# Patient Record
Sex: MALE | Race: WHITE | NOT HISPANIC OR LATINO | Employment: FULL TIME | URBAN - METROPOLITAN AREA
[De-identification: names, ages, dates, MRNs, and addresses within clinical notes are randomized per-mention and may not be internally consistent; named-entity substitution may affect disease eponyms.]

---

## 2017-04-21 ENCOUNTER — OFFICE VISIT (OUTPATIENT)
Dept: ALLERGY | Facility: CLINIC | Age: 27
End: 2017-04-21
Payer: COMMERCIAL

## 2017-04-21 VITALS
HEIGHT: 68 IN | BODY MASS INDEX: 25.86 KG/M2 | OXYGEN SATURATION: 98 % | WEIGHT: 170.64 LBS | SYSTOLIC BLOOD PRESSURE: 124 MMHG | DIASTOLIC BLOOD PRESSURE: 79 MMHG | HEART RATE: 65 BPM

## 2017-04-21 DIAGNOSIS — J30.9 ATOPIC RHINITIS: Primary | ICD-10-CM

## 2017-04-21 DIAGNOSIS — H10.10 ALLERGIC CONJUNCTIVITIS, UNSPECIFIED LATERALITY: ICD-10-CM

## 2017-04-21 DIAGNOSIS — J45.20 INTERMITTENT ASTHMA, UNCOMPLICATED: ICD-10-CM

## 2017-04-21 LAB
FEF 25/75: NORMAL
FEV-1: NORMAL
FEV1/FVC: NORMAL
FVC: NORMAL

## 2017-04-21 PROCEDURE — 99204 OFFICE O/P NEW MOD 45 MIN: CPT | Mod: 25 | Performed by: ALLERGY & IMMUNOLOGY

## 2017-04-21 PROCEDURE — A4627 SPACER BAG/RESERVOIR: HCPCS | Performed by: ALLERGY & IMMUNOLOGY

## 2017-04-21 RX ORDER — ALBUTEROL SULFATE 90 UG/1
2 AEROSOL, METERED RESPIRATORY (INHALATION) EVERY 4 HOURS PRN
Qty: 1 INHALER | Refills: 3 | Status: SHIPPED | OUTPATIENT
Start: 2017-04-21 | End: 2017-09-29

## 2017-04-21 RX ORDER — FLUTICASONE PROPIONATE 50 MCG
2 SPRAY, SUSPENSION (ML) NASAL DAILY
Qty: 1 BOTTLE | Refills: 11 | Status: SHIPPED | OUTPATIENT
Start: 2017-04-21

## 2017-04-21 ASSESSMENT — ENCOUNTER SYMPTOMS
HEADACHES: 0
EYE ITCHING: 1
COUGH: 0
STRIDOR: 0
SINUS PRESSURE: 1
ADENOPATHY: 0
RHINORRHEA: 1
ACTIVITY CHANGE: 0
MYALGIAS: 0
VOMITING: 0
EYE DISCHARGE: 0
WHEEZING: 1
FATIGUE: 0
FACIAL SWELLING: 0
EYE REDNESS: 1
SHORTNESS OF BREATH: 0
CHEST TIGHTNESS: 0
FEVER: 0
NAUSEA: 0
DIARRHEA: 0

## 2017-04-21 NOTE — NURSING NOTE
Writer demonstrated how to use an MDI inhaler with a spacer for patient.  Patient instructed to shake the inhaler for 5 seconds, empty the lungs by exhaling away from inhaler, put the inhaler + spacer in his mouth, push down on the inhaler and breathe in at the same time and then hold breath for 10 seconds.  RN informed patient that if an audible whistle is heard from spacer, he needs to inhale more slowly.  Patient advised to wait 1-2 minutes between puffs.  Patient instructed on how to clean the MDI inhaler, take the medication canister out, wash it in warm soapy water, rinse it and then let it dry over night.  RN advised patient to refer to handout with spacer for cleaning instructions.  Patient informed the inhaler needs to be washed once a week or when he notices a powder buildup.  Patient verbalized understanding.     Vianney TOMLINSON RN  Specialty Flex

## 2017-04-21 NOTE — NURSING NOTE
"Initial /79 (BP Location: Left arm, Patient Position: Chair, Cuff Size: Adult Regular)  Pulse 65  Ht 5' 8.11\" (1.73 m)  Wt 170 lb 10.2 oz (77.4 kg)  SpO2 98%  BMI 25.86 kg/m2 Estimated body mass index is 25.86 kg/(m^2) as calculated from the following:    Height as of this encounter: 5' 8.11\" (1.73 m).    Weight as of this encounter: 170 lb 10.2 oz (77.4 kg). .    "

## 2017-04-21 NOTE — MR AVS SNAPSHOT
"              After Visit Summary   4/21/2017    Dmitri Mcguire    MRN: [id number]           Patient Information     Date Of Birth          1990        Visit Information        Provider Department      4/21/2017 8:20 AM Carlo Alegre MD [facility]        Today's Diagnoses     Atopic rhinitis    -  1    Allergic conjunctivitis, unspecified laterality        Intermittent asthma, uncomplicated          Care Instructions    -start Flonase 2 sprays/each nostril once a day.  -Start albuterol inhaler 2-4 puffs every 4-6 hours as needed for chest tightness/wheezing/shortness of breath/persistent cough.  -Use all MDI inhalers with spacer/chamber device.        Follow-ups after your visit        Follow-up notes from your care team     Return in about 1 week (around 4/28/2017) for rhinitis follow up, skin testing.      Who to contact     If you have questions or need follow up information about today's clinic visit or your schedule please contact [facility] directly at [phone].  Normal or non-critical lab and imaging results will be communicated to you by Coterahart, letter or phone within 4 business days after the clinic has received the results. If you do not hear from us within 7 days, please contact the clinic through Caspert or phone. If you have a critical or abnormal lab result, we will notify you by phone as soon as possible.  Submit refill requests through Localo or call your pharmacy and they will forward the refill request to us. Please allow 3 business days for your refill to be completed.          Additional Information About Your Visit        CoteraharAkira Mobile Information     Localo lets you send messages to your doctor, view your test results, renew your prescriptions, schedule appointments and more. To sign up, go to [email or web address] . Click on \"Log in\" on the left side of the screen, which will take you to the Welcome page. Then click on \"Sign up Now\" on the right side of the " "page.     You will be asked to enter the access code listed below, as well as some personal information. Please follow the directions to create your username and password.     Your access code is: [email or web address]  Expires: 2017  9:35 AM     Your access code will  in 90 days. If you need help or a new code, please call your [place] clinic or [phone].        Care EveryWhere ID     This is your Care EveryWhere ID. This could be used by other organizations to access your [facility] medical records  [id number]        Your Vitals Were     Pulse Height Pulse Oximetry BMI (Body Mass Index)          65 5' 8.11\" (1.73 m) 98% 25.86 kg/m2         Blood Pressure from Last 3 Encounters:   17 124/79   16 (!) 138/91   10/30/15 118/72    Weight from Last 3 Encounters:   17 170 lb 10.2 oz (77.4 kg)   16 160 lb (72.6 kg)   10/30/15 159 lb (72.1 kg)              We Performed the Following     OPTICHAMBER     Spirometry, Breathing Capacity          Today's Medication Changes          These changes are accurate as of: 17  9:35 AM.  If you have any questions, ask your nurse or doctor.               Start taking these medicines.        Dose/Directions    albuterol 108 (90 BASE) MCG/ACT Inhaler   Commonly known as:  PROAIR HFA/PROVENTIL HFA/VENTOLIN HFA   Used for:  Intermittent asthma, uncomplicated   Started by:  Carlo Alegre MD        Dose:  2 puff   Inhale 2 puffs into the lungs every 4 hours as needed   Quantity:  1 Inhaler   Refills:  3       fluticasone 50 MCG/ACT spray   Commonly known as:  FLONASE   Used for:  Atopic rhinitis   Started by:  Carlo Alegre MD        Dose:  2 spray   Spray 2 sprays into both nostrils daily   Quantity:  1 Bottle   Refills:  11            Where to get your medicines      These medications were sent to [facility] [id number] [place] [address] [address] AT [facility] OF [address] [place] [address]  [address] [facility] [address]     Phone:  " [phone]     albuterol 108 (90 BASE) MCG/ACT Inhaler    fluticasone 50 MCG/ACT spray                Primary Care Provider Office Phone #    [facility] [phone]       No address on file        Thank you!     Thank you for choosing [facility]  for your care. Our goal is always to provide you with excellent care. Hearing back from our patients is one way we can continue to improve our services. Please take a few minutes to complete the written survey that you may receive in the mail after your visit with us. Thank you!             Your Updated Medication List - Protect others around you: Learn how to safely use, store and throw away your medicines at www.disposemymeds.org.          This list is accurate as of: 4/21/17  9:35 AM.  Always use your most recent med list.                   Brand Name Dispense Instructions for use    ADVIL 200 MG capsule   Generic drug:  ibuprofen      Take 200 mg by mouth every 4 hours as needed Reported on 4/21/2017       albuterol 108 (90 BASE) MCG/ACT Inhaler    PROAIR HFA/PROVENTIL HFA/VENTOLIN HFA    1 Inhaler    Inhale 2 puffs into the lungs every 4 hours as needed       azithromycin 250 MG tablet    ZITHROMAX    6 tablet    Two tablets first day, then one tablet daily for four days.       fluticasone 50 MCG/ACT spray    FLONASE    1 Bottle    Spray 2 sprays into both nostrils daily

## 2017-04-21 NOTE — PROGRESS NOTES
"SUBJECTIVE:                                                               Dmitri Mcguire presents today to our Allergy Clinic at [facility]; he is being seen  for a new patient visit.  As you know, he is a 27 year old male with presumptive allergic rhinitis.  3 years ago , he began to have seasonally-exacerbated (Spring, Summer and Fall) chronic nasal symptoms (itch, clear rhinorrhea, stuffiness, and sneezing) and ocular symptoms (itching).  HGe is worse around dogs/cats.   Intranasal steroids have not been used. Oral antihistamines /fexofenadine/diphenhydramine) have been used and Allegra seemed to be helpful. The patient symptoms are currently 75% controlled.      There is no history of PE tubes, sinus surgeries, or T/A.    He has history of wheezing around a lot of dogs. Has light symptoms of wheezing, SOB and chest tightness in Spring on and off. Has symptoms less than twice a week.  No previous history of hospitalizations.   Was diagnosed with asthma as a child. Was on inhaler before. Despite having symptoms, he was not prescribed albuterol inhaler.     Skin testing performed by Dr. Castillo Estes, ENT doctor, in 2015 with possible sensitivity forcat, dog, tree pollen (white jennifer, [place], [place], [place],  red mulberry), meadow  fescue and perennial rye grass, ans sheep sorrel. He had some intradermal testing done that I am not able to interpret.      When he was a child he took penicillin, he developed joint swelling, and his teeth \"turned balck\".  He has been avoiding penicillins since then. He will find more information from his father.    Patient Active Problem List   Diagnosis     Intermittent asthma, uncomplicated       Past Medical History:   Diagnosis Date     Allergic rhinitis due to other allergen      Mild intermittent asthma       Problem (# of Occurrences) Relation (Name,Age of Onset)    Cardiovascular (1) Maternal Grandfather: stents    HEART DISEASE (1) Maternal " Grandfather        History reviewed. No pertinent surgical history.  Social History     Social History     Marital status: Single     Spouse name: N/A     Number of children: N/A     Years of education: N/A     Social History Main Topics     Smoking status: Former Smoker     Years: 3.00     Types: Cigarettes, Dip, chew, snus or snuff     Smokeless tobacco: Current User     Types: Chew     Alcohol use Yes     Drug use: No     Sexual activity: Yes     Partners: Female     Birth control/ protection: Condom     Other Topics Concern     Parent/Sibling W/ Cabg, Mi Or Angioplasty Before 65f 55m? No     Social History Narrative    April 21, 2017    ENVIRONMENTAL HISTORY: [place] home is heated with a gas furnace. They does have central air conditioning. The patient's bedroom is furnished with carpeting in bedroom and fabric window coverings.  Pets inside the house include 1 rabbit. There is not history of cockroach or mice infestation. There is/are 0 smokers in the house.  The house does not have a damp basement.            Review of Systems   Constitutional: Negative for activity change, fatigue and fever.   HENT: Positive for postnasal drip, rhinorrhea, sinus pressure and sneezing. Negative for congestion, ear pain, facial swelling and nosebleeds.    Eyes: Positive for redness and itching. Negative for discharge.   Respiratory: Positive for wheezing. Negative for cough, chest tightness, shortness of breath and stridor.    Gastrointestinal: Negative for diarrhea, nausea and vomiting.   Musculoskeletal: Negative for myalgias.   Skin: Negative for rash.   Neurological: Negative for headaches.   Hematological: Negative for adenopathy.   Psychiatric/Behavioral: Negative for behavioral problems and self-injury.           Current Outpatient Prescriptions:      fluticasone (FLONASE) 50 MCG/ACT spray, Spray 2 sprays into both nostrils daily, Disp: 1 Bottle, Rfl: 11     albuterol  "(PROAIR HFA/PROVENTIL HFA/VENTOLIN HFA) 108 (90 BASE) MCG/ACT Inhaler, Inhale 2 puffs into the lungs every 4 hours as needed, Disp: 1 Inhaler, Rfl: 3     azithromycin (ZITHROMAX) 250 MG tablet, Two tablets first day, then one tablet daily for four days. (Patient not taking: Reported on 4/21/2017), Disp: 6 tablet, Rfl: 0     ibuprofen (ADVIL) 200 MG capsule, Take 200 mg by mouth every 4 hours as needed Reported on 4/21/2017, Disp: , Rfl:   Immunization History   Administered Date(s) Administered     DPT 1990, 1990, 1990, 10/05/1993, 05/14/1997     HIB 1990, 1992, 1992, 05/04/1993     Hepatitis A Vac Ped/Adol-2 Dose 08/25/2010     Hepatitis B 08/18/2004, 09/24/2004, 08/03/2005     IPV 05/14/1997     Influenza (IIV3) 11/27/2007, 12/01/2008, 10/05/2009, 09/17/2010     MMR 05/04/1993, 08/18/2004     Meningococcal (Menactra ) 08/25/2010     OPV 1992, 1992, 10/05/1993     Tetanus 08/18/2004     Allergies   Allergen Reactions     Dogs Hives     Amoxicillin Unknown     Ceclor [Cefaclor] Unknown     Erythrocin [Erythromycin] Diarrhea     Penicillins Unknown     OBJECTIVE:                                                                 /79 (BP Location: Left arm, Patient Position: Chair, Cuff Size: Adult Regular)  Pulse 65  Ht 5' 8.11\" (1.73 m)  Wt 170 lb 10.2 oz (77.4 kg)  SpO2 98%  BMI 25.86 kg/m2        Physical Exam   Constitutional: He is oriented to person, place, and time. No distress.   HENT:   Head: Normocephalic and atraumatic.   Right Ear: Tympanic membrane, external ear and ear canal normal.   Left Ear: Tympanic membrane, external ear and ear canal normal.   Nose: No mucosal edema or rhinorrhea.   Eyes: Conjunctivae are normal. Right eye exhibits no discharge. Left eye exhibits no discharge.   Neck: Normal range of motion.   Cardiovascular: Normal rate, regular rhythm and normal heart sounds.    No murmur heard.  Pulmonary/Chest: Effort normal and breath " sounds normal. No respiratory distress. He has no wheezes. He has no rales.   Musculoskeletal: Normal range of motion.   Lymphadenopathy:     He has no cervical adenopathy.   Neurological: He is alert and oriented to person, place, and time.   Skin: Skin is warm. He is not diaphoretic.   Psychiatric: Mood, memory and affect normal.   Nursing note and vitals reviewed.            WORKUP:SPIROMETRY       FVC 4.80L (92% of predicted).     FEV1 3.79L (87% of predicted).     FEV1/FVC 79     FEF 25%-75%  3.44L/s (74% of predicted).    Office spirometry performed today suggests borderline small airway limitation.    ASSESSMENT/PLAN:            Visit Diagnoses     1. Atopic rhinitis    -  Primary  Historically allergic, considering previous testing by ENT; however, unable to interpret all the results.  Unable to repeat percutaneous skin puncture testing for aeroallergens today since he took fexofenadine yesterday. Agreed to perform it next week.  Meanwhile, start intranasal fluticasone 2 sprays in each nostril once a day. He was instructed to hold oral antihistamines for one week.    Relevant Medications    fluticasone (FLONASE) 50 MCG/ACT spray    albuterol (PROAIR HFA/PROVENTIL HFA/VENTOLIN HFA) 108 (90 BASE) MCG/ACT Inhaler    2. Allergic conjunctivitis, unspecified laterality      We will see Flonase with control his conjunctivitis symptoms. If not, may recommend ketotifen eyedrops over-the-counter.    3. Intermittent asthma, uncomplicated      -Start albuterol inhaler 2-4 puffs every 4-6 hours as needed for chest tightness/wheezing/shortness of breath/persistent cough.  -Use all MDI inhalers with spacer/chamber device.  Borderline small airway limitation only. Currently asymptomatic. Would not treat the number only.      Relevant Medications    fluticasone (FLONASE) 50 MCG/ACT spray    albuterol (PROAIR HFA/PROVENTIL HFA/VENTOLIN HFA) 108 (90 BASE) MCG/ACT Inhaler    Other Relevant Orders    OPTICHAMBER (Completed)     Spirometry, Breathing Capacity            Follow up in 1 week for SPT or sooner if needed.    Thank you for allowing us to participate in the care of this patient. Please feel free to contact us if there are any questions or concerns about the patient.    Disclaimer: This note consists of symbols derived from keyboarding, dictation and/or voice recognition software. As a result, there may be errors in the script that have gone undetected. Please consider this when interpreting information found in this chart.    Carlo Alegre MD   Allergy, Asthma and Immunology  [place], [place] and [place]

## 2017-04-21 NOTE — PATIENT INSTRUCTIONS
-start Flonase 2 sprays/each nostril once a day.  -Start albuterol inhaler 2-4 puffs every 4-6 hours as needed for chest tightness/wheezing/shortness of breath/persistent cough.  -Use all MDI inhalers with spacer/chamber device.

## 2017-04-21 NOTE — LETTER
To Whom It May Concern:    RE:  Dmitri Mcguire    Dmitri Mcguire was seen today at our clinic.  He will need to return to clinic in one week, 4/28/2017, at 11:40 a.m. to be further assessed.    Please contact me with any questions or concerns.    Sincerely,        Eryn Avila MA  [facility] Allergy and Asthma Department

## 2017-04-28 ENCOUNTER — OFFICE VISIT (OUTPATIENT)
Dept: ALLERGY | Facility: CLINIC | Age: 27
End: 2017-04-28
Payer: COMMERCIAL

## 2017-04-28 VITALS — SYSTOLIC BLOOD PRESSURE: 131 MMHG | DIASTOLIC BLOOD PRESSURE: 84 MMHG | HEART RATE: 71 BPM | TEMPERATURE: 98.8 F

## 2017-04-28 DIAGNOSIS — H10.45 CHRONIC ALLERGIC CONJUNCTIVITIS: ICD-10-CM

## 2017-04-28 DIAGNOSIS — J30.81 NON-SEASONAL ALLERGIC RHINITIS DUE TO ANIMAL HAIR AND DANDER: ICD-10-CM

## 2017-04-28 DIAGNOSIS — J30.1 SEASONAL ALLERGIC RHINITIS DUE TO POLLEN: Primary | ICD-10-CM

## 2017-04-28 DIAGNOSIS — Z51.6 NEED FOR DESENSITIZATION TO ALLERGENS: ICD-10-CM

## 2017-04-28 PROCEDURE — 95004 PERQ TESTS W/ALRGNC XTRCS: CPT | Performed by: ALLERGY & IMMUNOLOGY

## 2017-04-28 PROCEDURE — 99207 ZZC NO BILLABLE SERVICE THIS VISIT: CPT | Performed by: ALLERGY & IMMUNOLOGY

## 2017-04-28 RX ORDER — EPINEPHRINE 0.3 MG/.3ML
0.3 INJECTION SUBCUTANEOUS PRN
Qty: 0.6 ML | Refills: 1 | Status: SHIPPED | OUTPATIENT
Start: 2017-04-28 | End: 2018-05-18

## 2017-04-28 ASSESSMENT — ENCOUNTER SYMPTOMS
FEVER: 0
NAUSEA: 0
FATIGUE: 0
EYE ITCHING: 1
ADENOPATHY: 0
MYALGIAS: 0
EYE DISCHARGE: 0
EYE REDNESS: 1
ACTIVITY CHANGE: 0
SHORTNESS OF BREATH: 0
STRIDOR: 0
VOMITING: 0
RHINORRHEA: 1
DIARRHEA: 0
HEADACHES: 0
WHEEZING: 0
SINUS PRESSURE: 1
CHEST TIGHTNESS: 0
FACIAL SWELLING: 0
COUGH: 0

## 2017-04-28 NOTE — PROGRESS NOTES
SUBJECTIVE:                                                               Dmitri Mcguire, 27 year old male presents today to our Allergy Clinic at [facility], for percutaneous skin puncture testing for aeroallergens.         Patient Active Problem List   Diagnosis     Intermittent asthma, uncomplicated     Seasonal allergic rhinitis due to pollen     Non-seasonal allergic rhinitis due to animal hair and dander     Chronic allergic conjunctivitis       Past Medical History:   Diagnosis Date     Allergic rhinitis due to other allergen      Mild intermittent asthma       Problem (# of Occurrences) Relation (Name,Age of Onset)    Cardiovascular (1) Maternal Grandfather: stents    HEART DISEASE (1) Maternal Grandfather        No past surgical history on file.  Social History     Social History     Marital status: Single     Spouse name: N/A     Number of children: N/A     Years of education: N/A     Social History Main Topics     Smoking status: Former Smoker     Years: 3.00     Types: Cigarettes, Dip, chew, snus or snuff     Smokeless tobacco: Current User     Types: Chew     Alcohol use Yes     Drug use: No     Sexual activity: Yes     Partners: Female     Birth control/ protection: Condom     Other Topics Concern     Parent/Sibling W/ Cabg, Mi Or Angioplasty Before 65f 55m? No     Social History Narrative    April 21, 2017    ENVIRONMENTAL HISTORY: [place] The home is heated with a gas furnace. They does have central air conditioning. The patient's bedroom is furnished with carpeting in bedroom and fabric window coverings.  Pets inside the house include 1 rabbit. There is not history of cockroach or mice infestation. There are 0 smokers in the house.  The house does not have a damp basement.            Review of Systems   Constitutional: Negative for activity change, fatigue and fever.   HENT: Positive for congestion, postnasal drip, rhinorrhea, sinus  pressure and sneezing. Negative for ear pain, facial swelling and nosebleeds.    Eyes: Positive for redness and itching. Negative for discharge.   Respiratory: Negative for cough, chest tightness, shortness of breath, wheezing and stridor.    Gastrointestinal: Negative for diarrhea, nausea and vomiting.   Musculoskeletal: Negative for myalgias.   Skin: Negative for rash.   Neurological: Negative for headaches.   Hematological: Negative for adenopathy.   Psychiatric/Behavioral: Negative for behavioral problems and self-injury.           Current Outpatient Prescriptions:      EPINEPHrine (AUVI-Q) 0.3 MG/0.3ML injection, Inject 0.3 mLs (0.3 mg) into the muscle as needed for anaphylaxis, Disp: 0.6 mL, Rfl: 1     fluticasone (FLONASE) 50 MCG/ACT spray, Spray 2 sprays into both nostrils daily, Disp: 1 Bottle, Rfl: 11     albuterol (PROAIR HFA/PROVENTIL HFA/VENTOLIN HFA) 108 (90 BASE) MCG/ACT Inhaler, Inhale 2 puffs into the lungs every 4 hours as needed (Patient not taking: Reported on 4/28/2017), Disp: 1 Inhaler, Rfl: 3     ibuprofen (ADVIL) 200 MG capsule, Take 200 mg by mouth every 4 hours as needed Reported on 4/28/2017, Disp: , Rfl:   Immunization History   Administered Date(s) Administered     DPT 1992, 1992, 1992, 10/05/1993, 05/14/1997     HIB 1992, 1992, 1992, 05/04/1993     Hepatitis A Vac Ped/Adol-2 Dose 08/25/2010     Hepatitis B 08/18/2004, 09/24/2004, 08/03/2005     IPV 05/14/1997     Influenza (IIV3) 11/27/2007, 12/01/2008, 10/05/2009, 09/17/2010     MMR 05/04/1993, 08/18/2004     Meningococcal (Menactra ) 08/25/2010     OPV 1992, 1992, 10/05/1993     Tetanus 08/18/2004     Allergies   Allergen Reactions     Dogs Hives     Amoxicillin Unknown     Ceclor [Cefaclor] Unknown     Erythrocin [Erythromycin] Diarrhea     Penicillins Unknown     OBJECTIVE:                                                                 /84 (BP Location: Left arm, Patient  Position: Chair, Cuff Size: Adult Regular)  Pulse 71  Temp 98.8  F (37.1  C)        Physical Exam   Constitutional: No distress.   HENT:   Head: Normocephalic and atraumatic.   Eyes: Conjunctivae are normal.   Neck: Neck supple.   Cardiovascular: Normal rate and regular rhythm.    Musculoskeletal: Normal range of motion.   Neurological: He is alert.   Skin: Skin is dry. He is not diaphoretic.   Psychiatric: Affect normal.   Nursing note and vitals reviewed.            WORKUP:     Percutaneous skin puncture testing for aeroallergens performed today (April 28, 2017) showed sensitivity for dog, cat, rabbit and tree pollen (Birch mix, [place], [place], [place], and [place]). Borderline sensitivity for Selena/Greene noted. The patient had appropriate responses to positive and negative controls. See scanned testing sheet for more details.      ASSESSMENT/PLAN:      Problem List Items Addressed This Visit        Respiratory    1. Seasonal allergic rhinitis due to pollen - Primary  Avoidance measures discussed and information provided.  -Intranasal fluticasone 2 sprays in each nostril once a day.  He is interested in allergen immunotherapy.  The patient was counseled regarding the time commitment, risks, and benefits of allergen immunotherapy and he wishes to proceed.  We'll start once Spring is over (worse season for him) and AUVI-Q is on hands.    Relevant Medications    EPINEPHrine (AUVI-Q) 0.3 MG/0.3ML injection    Other Relevant Orders    ALLERGY SKIN TESTS,ALLERGENS (Completed)    2. Non-seasonal allergic rhinitis due to animal hair and dander    Relevant Medications    EPINEPHrine (AUVI-Q) 0.3 MG/0.3ML injection    Other Relevant Orders    ALLERGY SKIN TESTS,ALLERGENS (Completed)       Infectious/Inflammatory    3. Chronic allergic conjunctivitis  We'll see if Flonase would control his conjunctivitis symptoms. If not, may recommend ketotifen eyedrops over-the-counter.            Follow up in 3 months or sooner  if needed.    Thank you for allowing us to participate in the care of this patient. Please feel free to contact us if there are any questions or concerns about the patient.    Disclaimer: This note consists of symbols derived from keyboarding, dictation and/or voice recognition software. As a result, there may be errors in the script that have gone undetected. Please consider this when interpreting information found in this chart.    Carlo Alegre MD   Allergy, Asthma and Immunology  [place], [place] and [place]

## 2017-04-28 NOTE — PROGRESS NOTES
ALLERGY SOLUTION NEW REQUEST    Dmitri Mcguire 1990 MRN: [id number]    DATE NEEDED:  6/15/2017. The patient will not start the immunotherapy injections right away.       Vial Color Content   Top Dose         Vial Size  Green 1:1,000                          Cat, Dog                         Red 1:1 0.5                 5mL  Blue 1:100                          Cat, Dog                          Red 1:1 0.5                5mL  Yellow 1:10                          Cat, Dog                          Red 1:1 0.5                5mL  Red 1:1                           Cat, Dog                           Red 1:1 0.5                5mL          Shot Clinic Location:  [place]  Ship to Location: [place]  Special Instructions:  The patient will not start the immunotherapy injections right away. Will start in June'17.        Updated Prescription Needed: No      Requester Signature  Carlo Alegre

## 2017-04-28 NOTE — MR AVS SNAPSHOT
After Visit Summary   4/28/2017    Dmitri Mcguire    MRN: [id number]           Patient Information     Date Of Birth          1990        Visit Information        Provider Department      4/28/2017 11:40 AM Carlo Alegre MD [facility]        Today's Diagnoses     Seasonal allergic rhinitis due to pollen    -  1    Non-seasonal allergic rhinitis due to animal hair and dander        Chronic allergic conjunctivitis          Care Instructions    AEROALLERGEN AVOIDANCE INSTRUCTIONS  POLLEN  Pollens are the tiny airborne particles given off by trees, weeds, and grasses. They can be the cause of seasonal allergic rhinitis or hay fever symptoms, which include stuffy, itchy, runny nose, redness, swelling and itching of the eyes, and itching of the ears and throat. Here are some tips on how to avoid pollen exposure.  1. .Keep windows closed and use the air conditioner when possible.  2.  Avoid outside exposure in the early morning as pollen counts are highest at that time.  3.  Take a shower and wash hair each night.  4.  Consider wearing a mask when working in the yard and/or garden.  5.  Clean furnace filter monthly with HEPA filters. Consider a HEPA filter vacuum  which will prevent pollen from being reintroduced into the air.   PETS  Pets present many problems for people with allergies. Dander from pets is very difficult to remove and also is a food source for dust mites.  1.  If possible, find the pet a new home.  2.  If not possible, keep the pet outdoors. Never allow the pet into the bedroom.  3.  Wash pet weekly in warm water.  4.  Encase mattresses, pillows, and box springs in allergen-proof covers.  5.  Use HEPA air filters and a HEPA filter vacuum . Change filters monthly.            Follow-ups after your visit        Follow-up notes from your care team     Return in about 3 months (around 7/28/2017), or if symptoms worsen or fail to improve, for rhinitis follow up,  "asthma follow up.      Who to contact     If you have questions or need follow up information about today's clinic visit or your schedule please contact [facility] directly at [phone].  Normal or non-critical lab and imaging results will be communicated to you by MyChart, letter or phone within 4 business days after the clinic has received the results. If you do not hear from us within 7 days, please contact the clinic through MyChart or phone. If you have a critical or abnormal lab result, we will notify you by phone as soon as possible.  Submit refill requests through [employer] or call your pharmacy and they will forward the refill request to us. Please allow 3 business days for your refill to be completed.          Additional Information About Your Visit        [employer] [facility] Information     [employer] Network lets you send messages to your doctor, view your test results, renew your prescriptions, schedule appointments and more. To sign up, go to [email or web address] . Click on \"Log in\" on the left side of the screen, which will take you to the Welcome page. Then click on \"Sign up Now\" on the right side of the page.     You will be asked to enter the access code listed below, as well as some personal information. Please follow the directions to create your username and password.     Your access code is: [email or web address]  Expires: 2017  9:35 AM     Your access code will  in 90 days. If you need help or a new code, please call your [place] clinic or [phone].        Care EveryWhere ID     This is your Care EveryWhere ID. This could be used by other organizations to access your [place] medical records  [id number]        Your Vitals Were     Pulse Temperature                71 98.8  F (37.1  C)           Blood Pressure from Last 3 Encounters:   17 131/84   17 124/79   16 (!) 138/91    Weight from Last 3 Encounters:   17 170 lb 10.2 oz (77.4 kg)   16 160 lb (72.6 kg) "   10/30/15 159 lb (72.1 kg)              We Performed the Following     ALLERGY SKIN TESTS,ALLERGENS          Today's Medication Changes          These changes are accurate as of: 4/28/17  1:26 PM.  If you have any questions, ask your nurse or doctor.               Start taking these medicines.        Dose/Directions    EPINEPHrine 0.3 MG/0.3ML injection   Commonly known as:  AUVI-Q   Used for:  Seasonal allergic rhinitis due to pollen, Non-seasonal allergic rhinitis due to animal hair and dander   Started by:  Carlo Alegre MD        Dose:  0.3 mg   Inject 0.3 mLs (0.3 mg) into the muscle as needed for anaphylaxis   Quantity:  0.6 mL   Refills:  1            Where to get your medicines      These medications were sent to [facility] [place] [address]     Phone:  [phone]     EPINEPHrine 0.3 MG/0.3ML injection                Primary Care Provider Office Phone #    [facility] [phone]       No address on file        Thank you!     Thank you for choosing [facility]  for your care. Our goal is always to provide you with excellent care. Hearing back from our patients is one way we can continue to improve our services. Please take a few minutes to complete the written survey that you may receive in the mail after your visit with us. Thank you!             Your Updated Medication List - Protect others around you: Learn how to safely use, store and throw away your medicines at www.disposemymeds.org.          This list is accurate as of: 4/28/17  1:26 PM.  Always use your most recent med list.                   Brand Name Dispense Instructions for use    ADVIL 200 MG capsule   Generic drug:  ibuprofen      Take 200 mg by mouth every 4 hours as needed Reported on 4/28/2017       albuterol 108 (90 BASE) MCG/ACT Inhaler    PROAIR HFA/PROVENTIL HFA/VENTOLIN HFA    1 Inhaler    Inhale 2 puffs into the lungs  every 4 hours as needed       EPINEPHrine 0.3 MG/0.3ML injection    AUVI-Q    0.6 mL    Inject 0.3 mLs (0.3 mg) into the muscle as needed for anaphylaxis       fluticasone 50 MCG/ACT spray    FLONASE    1 Bottle    Spray 2 sprays into both nostrils daily

## 2017-04-28 NOTE — PATIENT INSTRUCTIONS
AEROALLERGEN AVOIDANCE INSTRUCTIONS  POLLEN  Pollens are the tiny airborne particles given off by trees, weeds, and grasses. They can be the cause of seasonal allergic rhinitis or hay fever symptoms, which include stuffy, itchy, runny nose, redness, swelling and itching of the eyes, and itching of the ears and throat. Here are some tips on how to avoid pollen exposure.  1. .Keep windows closed and use the air conditioner when possible.  2.  Avoid outside exposure in the early morning as pollen counts are highest at that time.  3.  Take a shower and wash hair each night.  4.  Consider wearing a mask when working in the yard and/or garden.  5.  Clean furnace filter monthly with HEPA filters. Consider a HEPA filter vacuum  which will prevent pollen from being reintroduced into the air.   PETS  Pets present many problems for people with allergies. Dander from pets is very difficult to remove and also is a food source for dust mites.  1.  If possible, find the pet a new home.  2.  If not possible, keep the pet outdoors. Never allow the pet into the bedroom.  3.  Wash pet weekly in warm water.  4.  Encase mattresses, pillows, and box springs in allergen-proof covers.  5.  Use HEPA air filters and a HEPA filter vacuum . Change filters monthly.

## 2017-04-28 NOTE — Clinical Note
New Allergy Patient--please call the patient when serum(s) arrive(s). The patient will not start the immunotherapy injections right away. Will start in June'17. Needs to have AUVI-Q on hands.

## 2017-04-28 NOTE — NURSING NOTE
"Chief Complaint   Patient presents with     Allergy Testing Followup     skin testing       Initial /84 (BP Location: Left arm, Patient Position: Chair, Cuff Size: Adult Regular)  Pulse 71  Temp 98.8  F (37.1  C) Estimated body mass index is 25.86 kg/(m^2) as calculated from the following:    Height as of 4/21/17: 5' 8.11\" (1.73 m).    Weight as of 4/21/17: 170 lb 10.2 oz (77.4 kg).  Medication Reconciliation: complete    "

## 2017-05-25 ENCOUNTER — TELEPHONE (OUTPATIENT)
Dept: ALLERGY | Facility: CLINIC | Age: 27
End: 2017-05-25

## 2017-05-25 DIAGNOSIS — J30.2 SEASONAL ALLERGIC RHINITIS: Primary | ICD-10-CM

## 2017-05-25 PROCEDURE — 95165 ANTIGEN THERAPY SERVICES: CPT | Performed by: ALLERGY & IMMUNOLOGY

## 2017-05-25 NOTE — PROGRESS NOTES
Allergy serums received at [place].     Vials received below:    Vial Color   Content                                Vial Size Expiration Date  Green 1:1,000, Blue 1:100, Yellow 1:10 and Red 1:1 Cat, Dog 5mL  11/24/17,5/24/18        Signature  Rosalie Stringer

## 2017-05-25 NOTE — PROGRESS NOTES
Allergy serums billed at [place].     Vials received below:    Vial Color Content                      Vial Size Expiration Date  Green 1:1,000, Blue 1:100, Yellow 1:10 and Red 1:1 Cat, Dog 5mL  11/24/17, 5/24/18      Original Refill encounter date: 4/28/17      [place] Stringer

## 2017-05-25 NOTE — TELEPHONE ENCOUNTER
Patients serum have been received.  Tried to call patient and let him know that serums are here, but mailbox is full so unable to leave a message.    Rosalie Stringer, CMA

## 2017-05-30 NOTE — TELEPHONE ENCOUNTER
Attempted to reach patient to notify him that his allergy serums have arrived in clinic. Mailbox is still full and unable to leave a message for patient to return call to clinic.    Eryn Avila MA

## 2017-06-01 ENCOUNTER — OFFICE VISIT (OUTPATIENT)
Dept: FAMILY MEDICINE | Facility: CLINIC | Age: 27
End: 2017-06-01
Payer: COMMERCIAL

## 2017-06-01 ENCOUNTER — HOSPITAL ENCOUNTER (OUTPATIENT)
Dept: CT IMAGING | Facility: CLINIC | Age: 27
Discharge: HOME | End: 2017-06-01
Attending: NURSE PRACTITIONER | Admitting: NURSE PRACTITIONER
Payer: COMMERCIAL

## 2017-06-01 VITALS
BODY MASS INDEX: 25.6 KG/M2 | TEMPERATURE: 99.2 F | HEART RATE: 80 BPM | WEIGHT: 168.9 LBS | DIASTOLIC BLOOD PRESSURE: 78 MMHG | HEIGHT: 68 IN | SYSTOLIC BLOOD PRESSURE: 124 MMHG

## 2017-06-01 DIAGNOSIS — R10.31 RLQ ABDOMINAL PAIN: ICD-10-CM

## 2017-06-01 DIAGNOSIS — Z23 ENCOUNTER FOR IMMUNIZATION: Primary | ICD-10-CM

## 2017-06-01 LAB
ALBUMIN UR-MCNC: NEGATIVE MG/DL
APPEARANCE UR: CLEAR
BASOPHILS # BLD AUTO: 0 10E9/L (ref 0–0.2)
BASOPHILS NFR BLD AUTO: 0.3 %
BILIRUB UR QL STRIP: NEGATIVE
COLOR UR AUTO: YELLOW
CRP SERPL-MCNC: <2.9 MG/L (ref 0–8)
DIFFERENTIAL METHOD BLD: NORMAL
EOSINOPHIL # BLD AUTO: 0.4 10E9/L (ref 0–0.7)
EOSINOPHIL NFR BLD AUTO: 5.5 %
GLUCOSE UR STRIP-MCNC: NEGATIVE MG/DL
HGB UR QL STRIP: NEGATIVE
KETONES UR STRIP-MCNC: NEGATIVE MG/DL
LEUKOCYTE ESTERASE UR QL STRIP: NEGATIVE
LYMPHOCYTES # BLD AUTO: 1.8 10E9/L (ref 0.8–5.3)
LYMPHOCYTES NFR BLD AUTO: 24.7 %
MONOCYTES # BLD AUTO: 0.6 10E9/L (ref 0–1.3)
MONOCYTES NFR BLD AUTO: 8.6 %
NEUTROPHILS # BLD AUTO: 4.4 10E9/L (ref 1.6–8.3)
NEUTROPHILS NFR BLD AUTO: 60.9 %
NITRATE UR QL: NEGATIVE
PH UR STRIP: 6.5 PH (ref 5–7)
RBC #/AREA URNS AUTO: NORMAL /HPF (ref 0–2)
SP GR UR STRIP: 1.02 (ref 1–1.03)
URN SPEC COLLECT METH UR: NORMAL
UROBILINOGEN UR STRIP-ACNC: 1 EU/DL (ref 0.2–1)
WBC # BLD AUTO: 7.1 10E9/L (ref 4–11)
WBC #/AREA URNS AUTO: NORMAL /HPF (ref 0–2)

## 2017-06-01 PROCEDURE — 99214 OFFICE O/P EST MOD 30 MIN: CPT | Mod: 25 | Performed by: NURSE PRACTITIONER

## 2017-06-01 PROCEDURE — 86140 C-REACTIVE PROTEIN: CPT | Performed by: NURSE PRACTITIONER

## 2017-06-01 PROCEDURE — 81001 URINALYSIS AUTO W/SCOPE: CPT | Performed by: NURSE PRACTITIONER

## 2017-06-01 PROCEDURE — 36415 COLL VENOUS BLD VENIPUNCTURE: CPT | Performed by: NURSE PRACTITIONER

## 2017-06-01 PROCEDURE — 90471 IMMUNIZATION ADMIN: CPT | Performed by: NURSE PRACTITIONER

## 2017-06-01 PROCEDURE — 85004 AUTOMATED DIFF WBC COUNT: CPT | Performed by: NURSE PRACTITIONER

## 2017-06-01 PROCEDURE — 90715 TDAP VACCINE 7 YRS/> IM: CPT | Performed by: NURSE PRACTITIONER

## 2017-06-01 PROCEDURE — 25000125 ZZHC RX 250: Performed by: NURSE PRACTITIONER

## 2017-06-01 PROCEDURE — 25000128 H RX IP 250 OP 636: Performed by: NURSE PRACTITIONER

## 2017-06-01 PROCEDURE — 85048 AUTOMATED LEUKOCYTE COUNT: CPT | Performed by: NURSE PRACTITIONER

## 2017-06-01 PROCEDURE — 74177 CT ABD & PELVIS W/CONTRAST: CPT

## 2017-06-01 RX ORDER — IOPAMIDOL 755 MG/ML
80 INJECTION, SOLUTION INTRAVASCULAR ONCE
Status: COMPLETED | OUTPATIENT
Start: 2017-06-01 | End: 2017-06-01

## 2017-06-01 RX ADMIN — IOPAMIDOL 80 ML: 755 INJECTION, SOLUTION INTRAVENOUS at 17:41

## 2017-06-01 RX ADMIN — SODIUM CHLORIDE 60 ML: 9 INJECTION, SOLUTION INTRAVENOUS at 17:41

## 2017-06-01 NOTE — PROGRESS NOTES
HPI: Dmitri Mcguire is a 27 year old male who comes in for RLQ abdominal pain:  Onset: 4 days ago.  Timing: Daily.  Location: RLQ but radiates around to side and back, rates at 6-7/10.   Severity: Moderate  Duration: Intermittent, but often/hourly.  Alleviating Factors: stretching.  Aggrevating Factors: riding motorcycle, side-lying.  Associated Symptoms: urinary frequency (Q1h), dysuria, back pain, pressure w/ urination, presents with low-grade fever.    Patient Active Problem List   Diagnosis     Intermittent asthma, uncomplicated     Seasonal allergic rhinitis due to pollen     Non-seasonal allergic rhinitis due to animal hair and dander     Chronic allergic conjunctivitis     He has a medical hx of  does not have any pertinent problems on file.    Current Outpatient Prescriptions   Medication Sig Dispense Refill     fluticasone (FLONASE) 50 MCG/ACT spray Spray 2 sprays into both nostrils daily 1 Bottle 11     EPINEPHrine (AUVI-Q) 0.3 MG/0.3ML injection Inject 0.3 mLs (0.3 mg) into the muscle as needed for anaphylaxis (Patient not taking: Reported on 6/1/2017) 0.6 mL 1     [START ON 6/15/2017] ORDER FOR ALLERGEN IMMUNOTHERAPY Name of Mix: Mix #1  Cat, Dog  Cat Hair, Standardized 10,000 BAU/mL, ALK  2.0 ml  Dog Hair Dander, A. P.  1:100 w/v, HS  1.0 ml   Diluent: HSA qs to 5ml 5 mL PRN     [START ON 6/15/2017] ORDER FOR ALLERGEN IMMUNOTHERAPY Name of Mix: Mix #2  Tree   Birch Mix GLY 1:20 w/v, HS  0.5 ml  Boxelder-Maple  Mix BHR (Boxelder Hard Red) 1:20 w/v, HS  0.5 ml  [place] Mix GLY 1:20 w/v, HS 0.5 ml  [place] Mix RVW GLY 1:20 w/v, HS 0.5 ml  [place] Tree, Black GLY 1:20 w/v, HS 0.5 ml  [place], Black GLY 1:20 w/v, HS 0.5 ml  Diluent: HSA qs to 5ml 5 mL PRN     albuterol (PROAIR HFA/PROVENTIL HFA/VENTOLIN HFA) 108 (90 BASE) MCG/ACT Inhaler Inhale 2 puffs into the lungs every 4 hours as needed (Patient not taking: Reported on 4/28/2017) 1 Inhaler 3     ibuprofen (ADVIL) 200 MG capsule Take 200 mg by mouth every 4  hours as needed Reported on 4/28/2017       ALLERGIES: Dogs; Amoxicillin; Ceclor [cefaclor]; Erythrocin [erythromycin]; and Penicillins    PAST MEDICAL HX:   Past Medical History:   Diagnosis Date     Allergic rhinitis due to other allergen      Mild intermittent asthma      PAST SURGICAL HX: History reviewed. No pertinent surgical history.    IMMUNIZATION HX:   Immunization History   Administered Date(s) Administered     DPT 1990, 1990, 1990, 10/05/1993, 05/14/1997     HIB 1990, 1990, 1992, 05/04/1993     Hepatitis A Vac Ped/Adol-2 Dose 08/25/2010     Hepatitis B 08/18/2004, 09/24/2004, 08/03/2005     Influenza (IIV3) 11/27/2007, 12/01/2008, 10/05/2009, 09/17/2010     MMR 05/04/1993, 08/18/2004     Meningococcal (Menactra ) 08/25/2010     OPV 1992, 1992, 10/05/1993     Poliovirus, inactivated (IPV) 05/14/1997     TDAP Vaccine (Adacel) 06/01/2017     Tetanus 08/18/2004     SOCIAL HX:   Social History     Social History Narrative    April 21, 2017    ENVIRONMENTAL HISTORY: [place] is heated with a gas furnace. They does have central air conditioning. The patient's bedroom is furnished with carpeting in bedroom and fabric window coverings.  Pets inside the house include 1 rabbit. There is not history of cockroach or mice infestation. There are 0 smokers in the house.  The house does not have a damp basement.      ROS:   CONSTITUTIONAL: no fatigue, no unexpected change in weight  RESP: no significant cough, no shortness of breath  CV: no chest pain, no palpitations, no new or worsening peripheral edema  GI: no nausea, no vomiting, no constipation, no diarrhea. RLQ pain that radiates around to the back.   male :frequency, dysuria, and back pain as noted above.  MS: no claudication, no myalgias, no joint aches.  NEURO: no weakness, no dizziness, no syncope, no headaches. Sometimes he intermittently has dizziness, says  "he doesn't drink enough water (3 cups/day).   ENDOCRINE: no temperature intolerance, no skin/hair changes    OBJECTIVE:  /78  Pulse 80  Temp 99.2  F (36.7  C) (Tympanic)  Ht 5' 8.11\" (1.73 m)  Wt 168 lb 14.4 oz (76.6 kg)  BMI 25.6 kg/m2   Wt Readings from Last 1 Encounters:   06/01/17 168 lb 14.4 oz (76.6 kg)     Constitutional: appears ill, uncomfortable, but pleasant   Gastrointestinal: positive bowel sounds, tender in RLQ, +McBurneys sign, positive Psoas sign,  no hepatosplenomegaly, no masses. Difficult to palpate, patient was very tense.   Musculoskeletal: full range of motion, no edema   Skin: no concerning lesions, no jaundice, temp slightly elevated.  Neurological: normal gait,normal speech, no tremor   Psychological: appropriate mood, but anxious because he doesn't like coming in for anything.    ASSESSMENT/PLAN:    (R10.31) RLQ abdominal pain  Comment: Negative UA to r/o cystitis/pyelonephritis. Can't miss dx of Appendicitis  -WBC-normal, so I have low suspicion that he have appendicitis, acute appendicitis is unlikely when the WBC count is normal.     -CRP-normal   -UA with Microscopic reflex to Culture-unremarkable         Chlamydia trachomatis PCR, Neisseria         gonorrhoeae PCR        In-process.  -I don't know the cause of his abdominal pain since all lab work today came back normal.  -Recommended patient to go to ER because he is having acute abdominal pain, but patient is refusing. The patient aggreed to have abdominal CT scan to rule out acute pathology.   -his UA is normal, no hematuria, which excludes diagnosis of UTI, pyelonephritis, nephrolithiasis  -other possible differential: epiploic appendagitis, which is usually causing more LLQ abdominal pain rather then RLQ, but still possible in this case.        SHAKILA Bo CNP  "

## 2017-06-01 NOTE — PATIENT INSTRUCTIONS
WBC normal, appendicitis unlikely  Urine test normal    Recommend abdominal CT to completely rule out abdominal pathology    Lower abdominal pain can also be from chronic back issues, chronic back pain

## 2017-06-01 NOTE — NURSING NOTE
"Chief Complaint   Patient presents with     Abdominal Pain     4 days        Initial /78  Pulse 80  Temp 98.1  F (36.7  C) (Tympanic)  Ht 5' 8.11\" (1.73 m)  Wt 168 lb 14.4 oz (76.6 kg)  BMI 25.6 kg/m2 Estimated body mass index is 25.6 kg/(m^2) as calculated from the following:    Height as of this encounter: 5' 8.11\" (1.73 m).    Weight as of this encounter: 168 lb 14.4 oz (76.6 kg).  Medication Reconciliation: complete  "

## 2017-06-01 NOTE — MR AVS SNAPSHOT
"              After Visit Summary   6/1/2017    Dmitri Mcguire    MRN: [id number]           Patient Information     Date Of Birth          1990        Visit Information        Provider Department      6/1/2017 3:00 PM Clarissa Zuniga APRN CNP [facility]        Today's Diagnoses     Encounter for immunization    -  1    RLQ abdominal pain          Care Instructions    WBC normal, appendicitis unlikely  Urine test normal    Recommend abdominal CT to completely rule out abdominal pathology    Lower abdominal pain can also be from chronic back issues, chronic back pain                     Follow-ups after your visit        Future tests that were ordered for you today     Open Future Orders        Priority Expected Expires Ordered    CT Abdomen Pelvis w/o & w Contrast Routine  6/1/2018 6/1/2017            Who to contact     If you have questions or need follow up information about today's clinic visit or your schedule please contact [facility] directly at [phone].  Normal or non-critical lab and imaging results will be communicated to you by MyChart, letter or phone within 4 business days after the clinic has received the results. If you do not hear from us within 7 days, please contact the clinic through MyChart or phone. If you have a critical or abnormal lab result, we will notify you by phone as soon as possible.  Submit refill requests through [employer] or call your pharmacy and they will forward the refill request to us. Please allow 3 business days for your refill to be completed.          Additional Information About Your Visit        MyChart Information     [employer] lets you send messages to your doctor, view your test results, renew your prescriptions, schedule appointments and more. To sign up, go to [email or web address] . Click on \"Log in\" on the left side of the screen, which will take you to the Welcome page. Then click on \"Sign up Now\" on the right side of the " "page.     You will be asked to enter the access code listed below, as well as some personal information. Please follow the directions to create your username and password.     Your access code is: [email or web address]  Expires: 2017  9:35 AM     Your access code will  in 90 days. If you need help or a new code, please call your [place] clinic or [phone].        Care EveryWhere ID     This is your Care EveryWhere ID. This could be used by other organizations to access your [place] medical records  [id number]        Your Vitals Were     Pulse Temperature Height BMI (Body Mass Index)          80 99.2  F (37.3  C) (Tympanic) 5' 8.11\" (1.73 m) 25.6 kg/m2         Blood Pressure from Last 3 Encounters:   17 124/78   17 131/84   17 124/79    Weight from Last 3 Encounters:   17 168 lb 14.4 oz (76.6 kg)   17 170 lb 10.2 oz (77.4 kg)   16 160 lb (72.6 kg)              We Performed the Following     ADMIN 1st VACCINE     Chlamydia trachomatis PCR     CRP, inflammation     Neisseria gonorrhoeae PCR     TDAP VACCINE (ADACEL)     UA with Microscopic reflex to Culture     WBC with Diff        Primary Care Provider Office Phone #    [facility] [phone]       No address on file        Thank you!     Thank you for choosing [facility]  for your care. Our goal is always to provide you with excellent care. Hearing back from our patients is one way we can continue to improve our services. Please take a few minutes to complete the written survey that you may receive in the mail after your visit with us. Thank you!             Your Updated Medication List - Protect others around you: Learn how to safely use, store and throw away your medicines at www.disposemymeds.org.          This list is accurate as of: 17  5:11 PM.  Always use your most recent med list.                   Brand Name Dispense Instructions for use    ADVIL 200 MG capsule   Generic drug: "  ibuprofen      Take 200 mg by mouth every 4 hours as needed Reported on 4/28/2017       albuterol 108 (90 BASE) MCG/ACT Inhaler    PROAIR HFA/PROVENTIL HFA/VENTOLIN HFA    1 Inhaler    Inhale 2 puffs into the lungs every 4 hours as needed       * ALLERGEN IMMUNOTHERAPY PRESCRIPTION   Start taking on:  6/15/2017     5 mL    Name of Mix: Mix #1  Cat, Dog Cat Hair, Standardized 10,000 BAU/mL, ALK  2.0 ml Dog Hair Dander, A. P.  1:100 w/v, HS  1.0 ml  Diluent: HSA qs to 5ml       * ALLERGEN IMMUNOTHERAPY PRESCRIPTION   Start taking on:  6/15/2017     5 mL    Name of Mix: Mix #2  Tree  Birch Mix GLY 1:20 w/v, HS  0.5 ml Boxelder-Maple  Mix BHR (Boxelder Hard Red) 1:20 w/v, HS  0.5 ml [place] Mix GLY 1:20 w/v, HS 0.5 ml Oak Mix RVW GLY 1:20 w/v, HS 0.5 ml [place] Tree, Black GLY 1:20 w/v, HS 0.5 ml [place], Black GLY 1:20 w/v, HS 0.5 ml Diluent: HSA qs to 5ml       EPINEPHrine 0.3 MG/0.3ML injection    AUVI-Q    0.6 mL    Inject 0.3 mLs (0.3 mg) into the muscle as needed for anaphylaxis       fluticasone 50 MCG/ACT spray    FLONASE    1 Bottle    Spray 2 sprays into both nostrils daily       * Notice:  This list has 2 medication(s) that are the same as other medications prescribed for you. Read the directions carefully, and ask your doctor or other care provider to review them with you.

## 2017-06-05 NOTE — TELEPHONE ENCOUNTER
I spoke with patient and informed him that his serums have arrived and he can schedule an appointment for his injections.    Rosalie Stringer

## 2017-06-30 ENCOUNTER — ALLIED HEALTH/NURSE VISIT (OUTPATIENT)
Dept: ALLERGY | Facility: CLINIC | Age: 27
End: 2017-06-30
Payer: COMMERCIAL

## 2017-06-30 DIAGNOSIS — J30.9 ALLERGIC RHINITIS, UNSPECIFIED: Primary | ICD-10-CM

## 2017-06-30 PROCEDURE — 95115 IMMUNOTHERAPY ONE INJECTION: CPT

## 2017-06-30 PROCEDURE — 99207 ZZC NO CHARGE LOS: CPT

## 2017-06-30 NOTE — PROGRESS NOTES
Patient presented after waiting 30 minutes with no reaction to  injections. Discharged from clinic.  Megan Velasco RN

## 2017-07-07 ENCOUNTER — ALLIED HEALTH/NURSE VISIT (OUTPATIENT)
Dept: ALLERGY | Facility: CLINIC | Age: 27
End: 2017-07-07
Payer: COMMERCIAL

## 2017-07-07 DIAGNOSIS — J30.9 ALLERGIC RHINITIS, UNSPECIFIED: Primary | ICD-10-CM

## 2017-07-07 PROCEDURE — 95115 IMMUNOTHERAPY ONE INJECTION: CPT

## 2017-07-07 PROCEDURE — 99207 ZZC NO CHARGE LOS: CPT

## 2017-07-07 NOTE — MR AVS SNAPSHOT
"              After Visit Summary   2017    Dmitri Mcguire    MRN: [id number]           Patient Information     Date Of Birth          1990        Visit Information        Provider Department      2017 11:15 AM ALLERGY [place] - [facility]        Today's Diagnoses     Allergic rhinitis, unspecified    -  1       Follow-ups after your visit        Who to contact     If you have questions or need follow up information about today's clinic visit or your schedule please contact [facility] directly at [phone].  Normal or non-critical lab and imaging results will be communicated to you by MyChart, letter or phone within 4 business days after the clinic has received the results. If you do not hear from us within 7 days, please contact the clinic through [employer] or phone. If you have a critical or abnormal lab result, we will notify you by phone as soon as possible.  Submit refill requests through [employer] or call your pharmacy and they will forward the refill request to us. Please allow 3 business days for your refill to be completed.          Additional Information About Your Visit        MyChart Information     [employer] lets you send messages to your doctor, view your test results, renew your prescriptions, schedule appointments and more. To sign up, go to [email or web address] . Click on \"Log in\" on the left side of the screen, which will take you to the Welcome page. Then click on \"Sign up Now\" on the right side of the page.     You will be asked to enter the access code listed below, as well as some personal information. Please follow the directions to create your username and password.     Your access code is: [email or web address]  Expires: 2017  9:35 AM     Your access code will  in 90 days. If you need help or a new code, please call your [facility] or [phone].        Care EveryWhere ID     This is your Care EveryWhere ID. This could be used by other " organizations to access your [facility] medical records  [id number]         Blood Pressure from Last 3 Encounters:   06/01/17 124/78   04/28/17 131/84   04/21/17 124/79    Weight from Last 3 Encounters:   06/01/17 168 lb 14.4 oz (76.6 kg)   04/21/17 170 lb 10.2 oz (77.4 kg)   04/25/16 160 lb (72.6 kg)              We Performed the Following     Allergy Shot: One injection        Primary Care Provider Office Phone #    Tenisha [facility] [place] [phone]       No address on file        Equal Access to Services     EDUIN PENNINGTON : Hadii aad shilpa sanders Sonikko, waaxda luqadaha, qaybta kaalmada sebastián, josiah rahman . So [facility] [phone].    ATENCIÓN: Si habla español, tiene a spencer disposición servicios gratuitos de asistencia lingüística. Llame al [phone].    We comply with applicable federal civil rights laws and [place] laws. We do not discriminate on the basis of race, color, national origin, age, disability sex, sexual orientation or gender identity.            Thank you!     Thank you for choosing [facility]  for your care. Our goal is always to provide you with excellent care. Hearing back from our patients is one way we can continue to improve our services. Please take a few minutes to complete the written survey that you may receive in the mail after your visit with us. Thank you!             Your Updated Medication List - Protect others around you: Learn how to safely use, store and throw away your medicines at www.disposemymeds.org.          This list is accurate as of: 7/7/17 11:57 AM.  Always use your most recent med list.                   Brand Name Dispense Instructions for use Diagnosis    ADVIL 200 MG capsule   Generic drug:  ibuprofen      Take 200 mg by mouth every 4 hours as needed Reported on 4/28/2017        albuterol 108 (90 BASE) MCG/ACT Inhaler    PROAIR HFA/PROVENTIL HFA/VENTOLIN HFA    1 Inhaler    Inhale 2 puffs into the lungs every 4  hours as needed    Intermittent asthma, uncomplicated       * ALLERGEN IMMUNOTHERAPY PRESCRIPTION     5 mL    Name of Mix: Mix #1  Cat, Dog Cat Hair, Standardized 10,000 BAU/mL, ALK  2.0 ml Dog Hair Dander, A. P.  1:100 w/v, HS  1.0 ml  Diluent: HSA qs to 5ml    Seasonal allergic rhinitis due to pollen, Non-seasonal allergic rhinitis due to animal hair and dander, Chronic allergic conjunctivitis, Need for desensitization to allergens       * ALLERGEN IMMUNOTHERAPY PRESCRIPTION     5 mL    Name of Mix: Mix #2  Tree  Birch Mix GLY 1:20 w/v, HS  0.5 ml Boxelder-Maple  Mix BHR (Boxelder Hard Red) 1:20 w/v, HS  0.5 ml [place] Mix GLY 1:20 w/v, HS 0.5 ml Oak Mix RVW GLY 1:20 w/v, HS 0.5 ml [place] Tree, Black GLY 1:20 w/v, HS 0.5 ml [place], Black GLY 1:20 w/v, HS 0.5 ml Diluent: HSA qs to 5ml    Seasonal allergic rhinitis due to pollen, Non-seasonal allergic rhinitis due to animal hair and dander, Chronic allergic conjunctivitis, Need for desensitization to allergens       EPINEPHrine 0.3 MG/0.3ML injection    AUVI-Q    0.6 mL    Inject 0.3 mLs (0.3 mg) into the muscle as needed for anaphylaxis    Seasonal allergic rhinitis due to pollen, Non-seasonal allergic rhinitis due to animal hair and dander       fluticasone 50 MCG/ACT spray    FLONASE    1 Bottle    Spray 2 sprays into both nostrils daily    Atopic rhinitis       * Notice:  This list has 2 medication(s) that are the same as other medications prescribed for you. Read the directions carefully, and ask your doctor or other care provider to review them with you.

## 2017-07-07 NOTE — PROGRESS NOTES
Patient presented after waiting 30 minutes with no reaction to  injections. Discharged from clinic.    Mary Paul RN

## 2017-07-14 ENCOUNTER — ALLIED HEALTH/NURSE VISIT (OUTPATIENT)
Dept: ALLERGY | Facility: CLINIC | Age: 27
End: 2017-07-14
Payer: COMMERCIAL

## 2017-07-14 DIAGNOSIS — J30.9 ALLERGIC RHINITIS, UNSPECIFIED: Primary | ICD-10-CM

## 2017-07-14 PROCEDURE — 95115 IMMUNOTHERAPY ONE INJECTION: CPT

## 2017-07-14 PROCEDURE — 99207 ZZC NO CHARGE LOS: CPT

## 2017-07-14 NOTE — MR AVS SNAPSHOT
After Visit Summary   7/14/2017    Dmitri Mcguire    MRN: [id number]           Patient Information     Date Of Birth          1990        Visit Information        Provider Department      7/14/2017 11:00 AM ALLERGY [facility]        Today's Diagnoses     Allergic rhinitis, unspecified    -  1       Follow-ups after your visit        Your next 10 appointments already scheduled     Jul 21, 2017 11:15 AM CDT   Nurse Only with ALLERGY [facility] ([facility])    [address] [address]   [phone]           Every allergy patient MUST wait 30 minutes after their allergy shot. No exceptions.  Xolair shots #1-3 should plan to wait 2 hours in clinic Xolair shots after #4 should plan 30 minute wait in clinic            Jul 28, 2017 11:15 AM CDT   Nurse Only with ALLERGY [facility] ([facility])    [address] [address]   [phone]           Every allergy patient MUST wait 30 minutes after their allergy shot. No exceptions.  Xolair shots #1-3 should plan to wait 2 hours in clinic Xolair shots after #4 should plan 30 minute wait in clinic            Aug 04, 2017 11:15 AM CDT   Nurse Only with ALLERGY [facility] ([facility])    [address] [address]   [phone]           Every allergy patient MUST wait 30 minutes after their allergy shot. No exceptions.  Xolair shots #1-3 should plan to wait 2 hours in clinic Xolair shots after #4 should plan 30 minute wait in clinic              Who to contact     If you have questions or need follow up information about today's clinic visit or your schedule please contact [facility] directly at [phone].  Normal or non-critical lab and imaging results will be communicated to you by MyChart, letter or phone within 4 business  "days after the clinic has received the results. If you do not hear from us within 7 days, please contact the clinic through [employer] or phone. If you have a critical or abnormal lab result, we will notify you by phone as soon as possible.  Submit refill requests through [employer] or call your pharmacy and they will forward the refill request to us. Please allow 3 business days for your refill to be completed.          Additional Information About Your Visit        [employer] Information     [employer] lets you send messages to your doctor, view your test results, renew your prescriptions, schedule appointments and more. To sign up, go to [email or web address] . Click on \"Log in\" on the left side of the screen, which will take you to the Welcome page. Then click on \"Sign up Now\" on the right side of the page.     You will be asked to enter the access code listed below, as well as some personal information. Please follow the directions to create your username and password.     Your access code is: [email or web address]  Expires: 2017  9:35 AM     Your access code will  in 90 days. If you need help or a new code, please call your [facility] clinic or [phone].        Care EveryWhere ID     This is your Care EveryWhere ID. This could be used by other organizations to access your [facility] medical records  [id number]         Blood Pressure from Last 3 Encounters:   17 124/78   17 131/84   17 124/79    Weight from Last 3 Encounters:   17 168 lb 14.4 oz (76.6 kg)   17 170 lb 10.2 oz (77.4 kg)   16 160 lb (72.6 kg)              We Performed the Following     Allergy Shot: One injection        Primary Care Provider Office Phone #    [facility] [phone]       No address on file        Equal Access to Services     EDUIN PENNINGTON AH: Elsie Lyle, waaxda luqadaha, qaybta kaalmada sebastián, josiah valentine. So [facility] " [phone].    ATENCIÓN: Si rolando jasso, tiene a spencer disposición servicios gratuitos de asistencia lingüística. Mushtaq elena [phone].    We comply with applicable federal civil rights laws and [place] laws. We do not discriminate on the basis of race, color, national origin, age, disability sex, sexual orientation or gender identity.            Thank you!     Thank you for choosing [facility]  for your care. Our goal is always to provide you with excellent care. Hearing back from our patients is one way we can continue to improve our services. Please take a few minutes to complete the written survey that you may receive in the mail after your visit with us. Thank you!             Your Updated Medication List - Protect others around you: Learn how to safely use, store and throw away your medicines at www.disposemymeds.org.          This list is accurate as of: 7/14/17 11:41 AM.  Always use your most recent med list.                   Brand Name Dispense Instructions for use Diagnosis    ADVIL 200 MG capsule   Generic drug:  ibuprofen      Take 200 mg by mouth every 4 hours as needed Reported on 4/28/2017        albuterol 108 (90 BASE) MCG/ACT Inhaler    PROAIR HFA/PROVENTIL HFA/VENTOLIN HFA    1 Inhaler    Inhale 2 puffs into the lungs every 4 hours as needed    Intermittent asthma, uncomplicated       * ALLERGEN IMMUNOTHERAPY PRESCRIPTION     5 mL    Name of Mix: Mix #1  Cat, Dog Cat Hair, Standardized 10,000 BAU/mL, ALK  2.0 ml Dog Hair Dander, A. P.  1:100 w/v, HS  1.0 ml  Diluent: HSA qs to 5ml    Seasonal allergic rhinitis due to pollen, Non-seasonal allergic rhinitis due to animal hair and dander, Chronic allergic conjunctivitis, Need for desensitization to allergens       * ALLERGEN IMMUNOTHERAPY PRESCRIPTION     5 mL    Name of Mix: Mix #2  Tree  Birch Mix GLY 1:20 w/v, HS  0.5 ml Boxelder-Maple  Mix BHR (Boxelder Hard Red) 1:20 w/v, HS  0.5 ml [place] Mix GLY 1:20 w/v, HS 0.5 ml Oak Mix  RVW GLY 1:20 w/v, HS 0.5 ml [place] Tree, Black GLY 1:20 w/v, HS 0.5 ml [place], Black GLY 1:20 w/v, HS 0.5 ml Diluent: HSA qs to 5ml    Seasonal allergic rhinitis due to pollen, Non-seasonal allergic rhinitis due to animal hair and dander, Chronic allergic conjunctivitis, Need for desensitization to allergens       EPINEPHrine 0.3 MG/0.3ML injection 2-pack    AUVI-Q    0.6 mL    Inject 0.3 mLs (0.3 mg) into the muscle as needed for anaphylaxis    Seasonal allergic rhinitis due to pollen, Non-seasonal allergic rhinitis due to animal hair and dander       fluticasone 50 MCG/ACT spray    FLONASE    1 Bottle    Spray 2 sprays into both nostrils daily    Atopic rhinitis       * Notice:  This list has 2 medication(s) that are the same as other medications prescribed for you. Read the directions carefully, and ask your doctor or other care provider to review them with you.

## 2017-07-21 ENCOUNTER — ALLIED HEALTH/NURSE VISIT (OUTPATIENT)
Dept: ALLERGY | Facility: CLINIC | Age: 27
End: 2017-07-21
Payer: COMMERCIAL

## 2017-07-21 DIAGNOSIS — J30.9 ALLERGIC RHINITIS, UNSPECIFIED: Primary | ICD-10-CM

## 2017-07-21 PROCEDURE — 99207 ZZC NO CHARGE LOS: CPT

## 2017-07-21 PROCEDURE — 95115 IMMUNOTHERAPY ONE INJECTION: CPT

## 2017-07-21 NOTE — MR AVS SNAPSHOT
After Visit Summary   7/21/2017    Dmitri Mcguire    MRN: [id number]           Patient Information     Date Of Birth          1990        Visit Information        Provider Department      7/21/2017 11:15 AM ALLERGY [facility]        Today's Diagnoses     Allergic rhinitis, unspecified    -  1       Follow-ups after your visit        Your next 10 appointments already scheduled     Jul 28, 2017 11:15 AM CDT   Nurse Only with ALLERGY [facility] ([facility])    [address] [address]   [phone]           Every allergy patient MUST wait 30 minutes after their allergy shot. No exceptions.  Xolair shots #1-3 should plan to wait 2 hours in clinic Xolair shots after #4 should plan 30 minute wait in clinic            Aug 04, 2017 11:15 AM CDT   Nurse Only with ALLERGY [facility] ([facility])    [address] [address]   [phone]           Every allergy patient MUST wait 30 minutes after their allergy shot. No exceptions.  Xolair shots #1-3 should plan to wait 2 hours in clinic Xolair shots after #4 should plan 30 minute wait in clinic              Who to contact     If you have questions or need follow up information about today's clinic visit or your schedule please contact [facility] directly at [phone].  Normal or non-critical lab and imaging results will be communicated to you by MyChart, letter or phone within 4 business days after the clinic has received the results. If you do not hear from us within 7 days, please contact the clinic through MyChart or phone. If you have a critical or abnormal lab result, we will notify you by phone as soon as possible.  Submit refill requests through [employer] or call your pharmacy and they will forward the refill request to us. Please allow 3 business days for your refill to be  "completed.          Additional Information About Your Visit        [employer] Information     [employer] lets you send messages to your doctor, view your test results, renew your prescriptions, schedule appointments and more. To sign up, go to [email or web address] . Click on \"Log in\" on the left side of the screen, which will take you to the Welcome page. Then click on \"Sign up Now\" on the right side of the page.     You will be asked to enter the access code listed below, as well as some personal information. Please follow the directions to create your username and password.     Your access code is: [email or web address]  Expires: 10/19/2017 11:52 AM     Your access code will  in 90 days. If you need help or a new code, please call your [place] clinic or [phone].        Care EveryWhere ID     This is your Care EveryWhere ID. This could be used by other organizations to access your [place] medical records  [id number]         Blood Pressure from Last 3 Encounters:   17 124/78   17 131/84   17 124/79    Weight from Last 3 Encounters:   17 168 lb 14.4 oz (76.6 kg)   17 170 lb 10.2 oz (77.4 kg)   16 160 lb (72.6 kg)              We Performed the Following     Allergy Shot: One injection        Primary Care Provider Office Phone #    Tenisha [facility] [phone]       No address on file        Equal Access to Services     EDUIN PENNINGTON : Hadii aad ku hadasho Soomaali, waaxda luqadaha, qaybta kaalmada adeegyada, josiah rahman . So [facility] [phone].    ATENCIÓN: Si habla español, tiene a spencer disposición servicios gratuitos de asistencia lingüística. Mushtaq al [phone].    We comply with applicable federal civil rights laws and [place] laws. We do not discriminate on the basis of race, color, national origin, age, disability sex, sexual orientation or gender identity.            Thank you!     Thank you for choosing [facility]  " for your care. Our goal is always to provide you with excellent care. Hearing back from our patients is one way we can continue to improve our services. Please take a few minutes to complete the written survey that you may receive in the mail after your visit with us. Thank you!             Your Updated Medication List - Protect others around you: Learn how to safely use, store and throw away your medicines at www.disposemymeds.org.          This list is accurate as of: 7/21/17 11:52 AM.  Always use your most recent med list.                   Brand Name Dispense Instructions for use Diagnosis    ADVIL 200 MG capsule   Generic drug:  ibuprofen      Take 200 mg by mouth every 4 hours as needed Reported on 4/28/2017        albuterol 108 (90 BASE) MCG/ACT Inhaler    PROAIR HFA/PROVENTIL HFA/VENTOLIN HFA    1 Inhaler    Inhale 2 puffs into the lungs every 4 hours as needed    Intermittent asthma, uncomplicated       * ALLERGEN IMMUNOTHERAPY PRESCRIPTION     5 mL    Name of Mix: Mix #1  Cat, Dog Cat Hair, Standardized 10,000 BAU/mL, ALK  2.0 ml Dog Hair Dander, A. P.  1:100 w/v, HS  1.0 ml  Diluent: HSA qs to 5ml    Seasonal allergic rhinitis due to pollen, Non-seasonal allergic rhinitis due to animal hair and dander, Chronic allergic conjunctivitis, Need for desensitization to allergens       * ALLERGEN IMMUNOTHERAPY PRESCRIPTION     5 mL    Name of Mix: Mix #2  Tree  Birch Mix GLY 1:20 w/v, HS  0.5 ml Boxelder-Maple  Mix BHR (Boxelder Hard Red) 1:20 w/v, HS  0.5 ml [place] Mix GLY 1:20 w/v, HS 0.5 ml Oak Mix RVW GLY 1:20 w/v, HS 0.5 ml [place] Tree, Black GLY 1:20 w/v, HS 0.5 ml [place], Black GLY 1:20 w/v, HS 0.5 ml Diluent: HSA qs to 5ml    Seasonal allergic rhinitis due to pollen, Non-seasonal allergic rhinitis due to animal hair and dander, Chronic allergic conjunctivitis, Need for desensitization to allergens       EPINEPHrine 0.3 MG/0.3ML injection 2-pack    AUVI-Q    0.6 mL    Inject 0.3 mLs (0.3 mg) into the  muscle as needed for anaphylaxis    Seasonal allergic rhinitis due to pollen, Non-seasonal allergic rhinitis due to animal hair and dander       fluticasone 50 MCG/ACT spray    FLONASE    1 Bottle    Spray 2 sprays into both nostrils daily    Atopic rhinitis       * Notice:  This list has 2 medication(s) that are the same as other medications prescribed for you. Read the directions carefully, and ask your doctor or other care provider to review them with you.

## 2017-07-28 ENCOUNTER — ALLIED HEALTH/NURSE VISIT (OUTPATIENT)
Dept: ALLERGY | Facility: CLINIC | Age: 27
End: 2017-07-28
Payer: COMMERCIAL

## 2017-07-28 DIAGNOSIS — J30.9 ALLERGIC RHINITIS, UNSPECIFIED: Primary | ICD-10-CM

## 2017-07-28 PROCEDURE — 99207 ZZC NO CHARGE LOS: CPT

## 2017-07-28 PROCEDURE — 95115 IMMUNOTHERAPY ONE INJECTION: CPT

## 2017-07-28 NOTE — MR AVS SNAPSHOT
"              After Visit Summary   7/28/2017    Dmitri Mcguire    MRN: [id number]           Patient Information     Date Of Birth          1990        Visit Information        Provider Department      7/28/2017 11:15 AM ALLERGY [facility]        Today's Diagnoses     Allergic rhinitis, unspecified    -  1       Follow-ups after your visit        Your next 10 appointments already scheduled     Aug 04, 2017 11:15 AM CDT   Nurse Only with ALLERGY [facility] ([facility])    [address] [address] [phone]           Every allergy patient MUST wait 30 minutes after their allergy shot. No exceptions.  Xolair shots #1-3 should plan to wait 2 hours in clinic Xolair shots after #4 should plan 30 minute wait in clinic              Who to contact     If you have questions or need follow up information about today's clinic visit or your schedule please contact [facility] directly at [phone].  Normal or non-critical lab and imaging results will be communicated to you by Speed Commercehart, letter or phone within 4 business days after the clinic has received the results. If you do not hear from us within 7 days, please contact the clinic through RentablesÂ®t or phone. If you have a critical or abnormal lab result, we will notify you by phone as soon as possible.  Submit refill requests through Armor5 or call your pharmacy and they will forward the refill request to us. Please allow 3 business days for your refill to be completed.          Additional Information About Your Visit        Speed CommerceharNicira Networks Information     Armor5 lets you send messages to your doctor, view your test results, renew your prescriptions, schedule appointments and more. To sign up, go to [email or web address] . Click on \"Log in\" on the left side of the screen, which will take you to the Welcome page. Then click on \"Sign up Now\" on the right side of the page. "     You will be asked to enter the access code listed below, as well as some personal information. Please follow the directions to create your username and password.     Your access code is: [email or web address]  Expires: 10/19/2017 11:52 AM     Your access code will  in 90 days. If you need help or a new code, please call your [place] clinic or [phone].        Care EveryWhere ID     This is your Care EveryWhere ID. This could be used by other organizations to access your [place] medical records  [id number]         Blood Pressure from Last 3 Encounters:   17 124/78   17 131/84   17 124/79    Weight from Last 3 Encounters:   17 168 lb 14.4 oz (76.6 kg)   17 170 lb 10.2 oz (77.4 kg)   16 160 lb (72.6 kg)              We Performed the Following     Allergy Shot: One injection        Primary Care Provider Office Phone #    [facility] [phone]       No address on file        Equal Access to Services     MILVIA PENNINGTON : Hadii aurelio ku hadasho Sonikko, waaxda luqadaha, qaybta kaalmada adelisayazita, josiah rahman . So [facility] [phone].    ATENCIÓN: Si habla español, tiene a spencer disposición servicios gratuitos de asistencia lingüística. Llame al [phone].    We comply with applicable federal civil rights laws and [place] laws. We do not discriminate on the basis of race, color, national origin, age, disability sex, sexual orientation or gender identity.            Thank you!     Thank you for choosing [facility]  for your care. Our goal is always to provide you with excellent care. Hearing back from our patients is one way we can continue to improve our services. Please take a few minutes to complete the written survey that you may receive in the mail after your visit with us. Thank you!             Your Updated Medication List - Protect others around you: Learn how to safely use, store and throw away your medicines at  www.disposemymeds.org.          This list is accurate as of: 7/28/17 11:47 AM.  Always use your most recent med list.                   Brand Name Dispense Instructions for use Diagnosis    ADVIL 200 MG capsule   Generic drug:  ibuprofen      Take 200 mg by mouth every 4 hours as needed Reported on 4/28/2017        albuterol 108 (90 BASE) MCG/ACT Inhaler    PROAIR HFA/PROVENTIL HFA/VENTOLIN HFA    1 Inhaler    Inhale 2 puffs into the lungs every 4 hours as needed    Intermittent asthma, uncomplicated       * ALLERGEN IMMUNOTHERAPY PRESCRIPTION     5 mL    Name of Mix: Mix #1  Cat, Dog Cat Hair, Standardized 10,000 BAU/mL, ALK  2.0 ml Dog Hair Dander, A. P.  1:100 w/v, HS  1.0 ml  Diluent: HSA qs to 5ml    Seasonal allergic rhinitis due to pollen, Non-seasonal allergic rhinitis due to animal hair and dander, Chronic allergic conjunctivitis, Need for desensitization to allergens       * ALLERGEN IMMUNOTHERAPY PRESCRIPTION     5 mL    Name of Mix: Mix #2  Tree  Birch Mix GLY 1:20 w/v, HS  0.5 ml Boxelder-Maple  Mix BHR (Boxelder Hard Red) 1:20 w/v, HS  0.5 ml [place] Mix GLY 1:20 w/v, HS 0.5 ml Oak Mix RVW GLY 1:20 w/v, HS 0.5 ml [place] Tree, Black GLY 1:20 w/v, HS 0.5 ml [place], Black GLY 1:20 w/v, HS 0.5 ml Diluent: HSA qs to 5ml    Seasonal allergic rhinitis due to pollen, Non-seasonal allergic rhinitis due to animal hair and dander, Chronic allergic conjunctivitis, Need for desensitization to allergens       EPINEPHrine 0.3 MG/0.3ML injection 2-pack    AUVI-Q    0.6 mL    Inject 0.3 mLs (0.3 mg) into the muscle as needed for anaphylaxis    Seasonal allergic rhinitis due to pollen, Non-seasonal allergic rhinitis due to animal hair and dander       fluticasone 50 MCG/ACT spray    FLONASE    1 Bottle    Spray 2 sprays into both nostrils daily    Atopic rhinitis       * Notice:  This list has 2 medication(s) that are the same as other medications prescribed for you. Read the directions carefully, and ask your doctor  or other care provider to review them with you.

## 2017-08-04 ENCOUNTER — ALLIED HEALTH/NURSE VISIT (OUTPATIENT)
Dept: ALLERGY | Facility: CLINIC | Age: 27
End: 2017-08-04
Payer: COMMERCIAL

## 2017-08-04 DIAGNOSIS — J30.9 ALLERGIC RHINITIS, UNSPECIFIED: Primary | ICD-10-CM

## 2017-08-04 PROCEDURE — 99207 ZZC NO CHARGE LOS: CPT

## 2017-08-04 PROCEDURE — 95115 IMMUNOTHERAPY ONE INJECTION: CPT

## 2017-08-04 NOTE — MR AVS SNAPSHOT
"              After Visit Summary   2017    Dmitri Mcguire    MRN: [id number]           Patient Information     Date Of Birth          1990        Visit Information        Provider Department      2017 11:15 AM ALLERGY [facility]        Today's Diagnoses     Allergic rhinitis, unspecified    -  1       Follow-ups after your visit        Who to contact     If you have questions or need follow up information about today's clinic visit or your schedule please contact [facility] directly at [phone].  Normal or non-critical lab and imaging results will be communicated to you by MyChart, letter or phone within 4 business days after the clinic has received the results. If you do not hear from us within 7 days, please contact the clinic through Voradiushart or phone. If you have a critical or abnormal lab result, we will notify you by phone as soon as possible.  Submit refill requests through Parasol [employer] or call your pharmacy and they will forward the refill request to us. Please allow 3 business days for your refill to be completed.          Additional Information About Your Visit        MyChart Information     [employer] lets you send messages to your doctor, view your test results, renew your prescriptions, schedule appointments and more. To sign up, go to [email or web address] . Click on \"Log in\" on the left side of the screen, which will take you to the Welcome page. Then click on \"Sign up Now\" on the right side of the page.     You will be asked to enter the access code listed below, as well as some personal information. Please follow the directions to create your username and password.     Your access code is: [email or web address]  Expires: 10/19/2017 11:52 AM     Your access code will  in 90 days. If you need help or a new code, please call your [facility] or [phone].        Care EveryWhere ID     This is your Care EveryWhere ID. This could be used by other " organizations to access your [place] medical records  [id number]         Blood Pressure from Last 3 Encounters:   06/01/17 124/78   04/28/17 131/84   04/21/17 124/79    Weight from Last 3 Encounters:   06/01/17 168 lb 14.4 oz (76.6 kg)   04/21/17 170 lb 10.2 oz (77.4 kg)   04/25/16 160 lb (72.6 kg)              We Performed the Following     Allergy Shot: One injection        Primary Care Provider Office Phone #    [facility] [phone]       No address on file        Equal Access to Services     EDUIN PENNINGTON : Hadii aad shilpa sanders Sonikko, waaxda luqadaha, qaybta kaalmada sebastián, josiah rahman . So [facility] [phone].    ATENCIÓN: Si habla español, tiene a spencer disposición servicios gratuitos de asistencia lingüística. Llame al [phone].    We comply with applicable federal civil rights laws and [place] laws. We do not discriminate on the basis of race, color, national origin, age, disability sex, sexual orientation or gender identity.            Thank you!     Thank you for choosing [facility]  for your care. Our goal is always to provide you with excellent care. Hearing back from our patients is one way we can continue to improve our services. Please take a few minutes to complete the written survey that you may receive in the mail after your visit with us. Thank you!             Your Updated Medication List - Protect others around you: Learn how to safely use, store and throw away your medicines at www.disposemymeds.org.          This list is accurate as of: 8/4/17 11:49 AM.  Always use your most recent med list.                   Brand Name Dispense Instructions for use Diagnosis    ADVIL 200 MG capsule   Generic drug:  ibuprofen      Take 200 mg by mouth every 4 hours as needed Reported on 4/28/2017        albuterol 108 (90 BASE) MCG/ACT Inhaler    PROAIR HFA/PROVENTIL HFA/VENTOLIN HFA    1 Inhaler    Inhale 2 puffs into the lungs every 4  hours as needed    Intermittent asthma, uncomplicated       * ALLERGEN IMMUNOTHERAPY PRESCRIPTION     5 mL    Name of Mix: Mix #1  Cat, Dog Cat Hair, Standardized 10,000 BAU/mL, ALK  2.0 ml Dog Hair Dander, A. P.  1:100 w/v, HS  1.0 ml  Diluent: HSA qs to 5ml    Seasonal allergic rhinitis due to pollen, Non-seasonal allergic rhinitis due to animal hair and dander, Chronic allergic conjunctivitis, Need for desensitization to allergens       * ALLERGEN IMMUNOTHERAPY PRESCRIPTION     5 mL    Name of Mix: Mix #2  Tree  Birch Mix GLY 1:20 w/v, HS  0.5 ml Boxelder-Maple  Mix BHR (Boxelder Hard Red) 1:20 w/v, HS  0.5 ml [place] Mix GLY 1:20 w/v, HS 0.5 ml Oak Mix RVW GLY 1:20 w/v, HS 0.5 ml [place] Tree, Black GLY 1:20 w/v, HS 0.5 ml [place], Black GLY 1:20 w/v, HS 0.5 ml Diluent: HSA qs to 5ml    Seasonal allergic rhinitis due to pollen, Non-seasonal allergic rhinitis due to animal hair and dander, Chronic allergic conjunctivitis, Need for desensitization to allergens       EPINEPHrine 0.3 MG/0.3ML injection 2-pack    AUVI-Q    0.6 mL    Inject 0.3 mLs (0.3 mg) into the muscle as needed for anaphylaxis    Seasonal allergic rhinitis due to pollen, Non-seasonal allergic rhinitis due to animal hair and dander       fluticasone 50 MCG/ACT spray    FLONASE    1 Bottle    Spray 2 sprays into both nostrils daily    Atopic rhinitis       * Notice:  This list has 2 medication(s) that are the same as other medications prescribed for you. Read the directions carefully, and ask your doctor or other care provider to review them with you.

## 2017-08-18 ENCOUNTER — ALLIED HEALTH/NURSE VISIT (OUTPATIENT)
Dept: ALLERGY | Facility: CLINIC | Age: 27
End: 2017-08-18
Payer: COMMERCIAL

## 2017-08-18 DIAGNOSIS — J30.9 ALLERGIC RHINITIS, UNSPECIFIED: Primary | ICD-10-CM

## 2017-08-18 PROCEDURE — 95115 IMMUNOTHERAPY ONE INJECTION: CPT

## 2017-08-25 ENCOUNTER — ALLIED HEALTH/NURSE VISIT (OUTPATIENT)
Dept: ALLERGY | Facility: CLINIC | Age: 27
End: 2017-08-25
Payer: COMMERCIAL

## 2017-08-25 DIAGNOSIS — J30.9 ALLERGIC RHINITIS, UNSPECIFIED: Primary | ICD-10-CM

## 2017-08-25 PROCEDURE — 95115 IMMUNOTHERAPY ONE INJECTION: CPT

## 2017-08-25 NOTE — PROGRESS NOTES
Patient presented after waiting 30 minutes with no reaction to  injections. Discharged from clinic.    Vianney TOMLINSON RN  Specialty Flex

## 2017-08-25 NOTE — MR AVS SNAPSHOT
After Visit Summary   8/25/2017    Dmitri Mcguire    MRN: [id number]           Patient Information     Date Of Birth          1990        Visit Information        Provider Department      8/25/2017 11:15 AM ALLERGY [facility]        Today's Diagnoses     Allergic rhinitis, unspecified    -  1       Follow-ups after your visit        Your next 10 appointments already scheduled     Sep 01, 2017 11:15 AM CDT   Nurse Only with ALLERGY [facility] ([facility])    [address] [address]   [phone]           Every allergy patient MUST wait 30 minutes after their allergy shot. No exceptions.  Xolair shots #1-3 should plan to wait 2 hours in clinic Xolair shots after #4 should plan 30 minute wait in clinic            Sep 08, 2017 11:15 AM CDT   Nurse Only with ALLERGY [facility] ([facility])    [address] [address]   [phone]           Every allergy patient MUST wait 30 minutes after their allergy shot. No exceptions.  Xolair shots #1-3 should plan to wait 2 hours in clinic Xolair shots after #4 should plan 30 minute wait in clinic            Sep 15, 2017 11:15 AM CDT   Nurse Only with ALLERGY [facility] ([facility])    [address] [address]   [phone]           Every allergy patient MUST wait 30 minutes after their allergy shot. No exceptions.  Xolair shots #1-3 should plan to wait 2 hours in clinic Xolair shots after #4 should plan 30 minute wait in clinic            Sep 22, 2017 11:15 AM CDT   Nurse Only with ALLERGY [facility] ([facility])    [address] [address]   [phone]           Every allergy patient MUST wait 30 minutes after their allergy shot. No exceptions.  Xolair shots #1-3 should plan  to wait 2 hours in clinic Xolair shots after #4 should plan 30 minute wait in clinic            Sep 29, 2017 11:15 AM CDT   Nurse Only with ALLERGY [facility] ([facility])    [address] [place] [address]   [phone]           Every allergy patient MUST wait 30 minutes after their allergy shot. No exceptions.  Xolair shots #1-3 should plan to wait 2 hours in clinic Xolair shots after #4 should plan 30 minute wait in clinic            Oct 06, 2017 11:15 AM CDT   Nurse Only with ALLERGY [facility] ([facility])    [address] [place] [address]   [phone]           Every allergy patient MUST wait 30 minutes after their allergy shot. No exceptions.  Xolair shots #1-3 should plan to wait 2 hours in clinic Xolair shots after #4 should plan 30 minute wait in clinic            Oct 13, 2017 11:15 AM CDT   Nurse Only with ALLERGY [facility] ([facility])    [address] [place] [address]   [phone]           Every allergy patient MUST wait 30 minutes after their allergy shot. No exceptions.  Xolair shots #1-3 should plan to wait 2 hours in clinic Xolair shots after #4 should plan 30 minute wait in clinic            Oct 20, 2017 11:15 AM CDT   Nurse Only with ALLERGY [facility] ([facility])    [address] [place] [address]   [phone]           Every allergy patient MUST wait 30 minutes after their allergy shot. No exceptions.  Xolair shots #1-3 should plan to wait 2 hours in clinic Xolair shots after #4 should plan 30 minute wait in clinic            Oct 27, 2017 11:15 AM CDT   Nurse Only with ALLERGY [facility] ([facility])    [address] [place] [address]   [phone]           Every allergy patient MUST wait 30  "minutes after their allergy shot. No exceptions.  Xolair shots #1-3 should plan to wait 2 hours in clinic Xolair shots after #4 should plan 30 minute wait in clinic            Nov 03, 2017 11:15 AM CDT   Nurse Only with ALLERGY [facility] ([facility])    [address] [address]   [phone]           Every allergy patient MUST wait 30 minutes after their allergy shot. No exceptions.  Xolair shots #1-3 should plan to wait 2 hours in clinic Xolair shots after #4 should plan 30 minute wait in clinic              Who to contact     If you have questions or need follow up information about today's clinic visit or your schedule please contact [facility] directly at [phone].  Normal or non-critical lab and imaging results will be communicated to you by MyChart, letter or phone within 4 business days after the clinic has received the results. If you do not hear from us within 7 days, please contact the clinic through eBreviahart or phone. If you have a critical or abnormal lab result, we will notify you by phone as soon as possible.  Submit refill requests through Diagnosia or call your pharmacy and they will forward the refill request to us. Please allow 3 business days for your refill to be completed.          Additional Information About Your Visit        eBreviaharrelocality Information     Diagnosia lets you send messages to your doctor, view your test results, renew your prescriptions, schedule appointments and more. To sign up, go to [email or web address] . Click on \"Log in\" on the left side of the screen, which will take you to the Welcome page. Then click on \"Sign up Now\" on the right side of the page.     You will be asked to enter the access code listed below, as well as some personal information. Please follow the directions to create your username and password.     Your access code is: [email or web address]  Expires: 10/19/2017 11:52 AM     Your access " code will  in 90 days. If you need help or a new code, please call your [place] clinic or [phone].        Care EveryWhere ID     This is your Care EveryWhere ID. This could be used by other organizations to access your [place] medical records  [id number]         Blood Pressure from Last 3 Encounters:   17 124/78   17 131/84   17 124/79    Weight from Last 3 Encounters:   17 168 lb 14.4 oz (76.6 kg)   17 170 lb 10.2 oz (77.4 kg)   16 160 lb (72.6 kg)              We Performed the Following     Allergy Shot: One injection        Primary Care Provider Office Phone #    [facility] [phone]       No address on file        Equal Access to Services     EDUIN PENNINGTON : Hadii aurelio sanders Sonikko, waaxda luqadaha, qaybta kaalmada adeegyazita, josiah rahman . So [facility] [phone].    ATENCIÓN: Si habla español, tiene a spencer disposición servicios gratuitos de asistencia lingüística. Llame al [phone].    We comply with applicable federal civil rights laws and [place] laws. We do not discriminate on the basis of race, color, national origin, age, disability sex, sexual orientation or gender identity.            Thank you!     Thank you for choosing [facility]  for your care. Our goal is always to provide you with excellent care. Hearing back from our patients is one way we can continue to improve our services. Please take a few minutes to complete the written survey that you may receive in the mail after your visit with us. Thank you!             Your Updated Medication List - Protect others around you: Learn how to safely use, store and throw away your medicines at www.disposemymeds.org.          This list is accurate as of: 17 12:04 PM.  Always use your most recent med list.                   Brand Name Dispense Instructions for use Diagnosis    ADVIL 200 MG capsule   Generic drug:  ibuprofen      Take  200 mg by mouth every 4 hours as needed Reported on 4/28/2017        albuterol 108 (90 BASE) MCG/ACT Inhaler    PROAIR HFA/PROVENTIL HFA/VENTOLIN HFA    1 Inhaler    Inhale 2 puffs into the lungs every 4 hours as needed    Intermittent asthma, uncomplicated       * ALLERGEN IMMUNOTHERAPY PRESCRIPTION     5 mL    Name of Mix: Mix #1  Cat, Dog Cat Hair, Standardized 10,000 BAU/mL, ALK  2.0 ml Dog Hair Dander, A. P.  1:100 w/v, HS  1.0 ml  Diluent: HSA qs to 5ml    Seasonal allergic rhinitis due to pollen, Non-seasonal allergic rhinitis due to animal hair and dander, Chronic allergic conjunctivitis, Need for desensitization to allergens       * ALLERGEN IMMUNOTHERAPY PRESCRIPTION     5 mL    Name of Mix: Mix #2  Tree  Birch Mix GLY 1:20 w/v, HS  0.5 ml Boxelder-Maple  Mix BHR (Boxelder Hard Red) 1:20 w/v, HS  0.5 ml [place] Mix GLY 1:20 w/v, HS 0.5 ml Oak Mix RVW GLY 1:20 w/v, HS 0.5 ml [place] Tree, Black GLY 1:20 w/v, HS 0.5 ml [place], Black GLY 1:20 w/v, HS 0.5 ml Diluent: HSA qs to 5ml    Seasonal allergic rhinitis due to pollen, Non-seasonal allergic rhinitis due to animal hair and dander, Chronic allergic conjunctivitis, Need for desensitization to allergens       EPINEPHrine 0.3 MG/0.3ML injection 2-pack    AUVI-Q    0.6 mL    Inject 0.3 mLs (0.3 mg) into the muscle as needed for anaphylaxis    Seasonal allergic rhinitis due to pollen, Non-seasonal allergic rhinitis due to animal hair and dander       fluticasone 50 MCG/ACT spray    FLONASE    1 Bottle    Spray 2 sprays into both nostrils daily    Atopic rhinitis       * Notice:  This list has 2 medication(s) that are the same as other medications prescribed for you. Read the directions carefully, and ask your doctor or other care provider to review them with you.

## 2017-09-01 ENCOUNTER — ALLIED HEALTH/NURSE VISIT (OUTPATIENT)
Dept: ALLERGY | Facility: CLINIC | Age: 27
End: 2017-09-01
Payer: COMMERCIAL

## 2017-09-01 DIAGNOSIS — J30.9 ALLERGIC RHINITIS, UNSPECIFIED: Primary | ICD-10-CM

## 2017-09-01 PROCEDURE — 95115 IMMUNOTHERAPY ONE INJECTION: CPT

## 2017-09-01 NOTE — MR AVS SNAPSHOT
After Visit Summary   9/1/2017    Dmitri Mcguire    MRN: [id number]           Patient Information     Date Of Birth          1990        Visit Information        Provider Department      9/1/2017 11:15 AM ALLERGY [facility]        Today's Diagnoses     Allergic rhinitis, unspecified    -  1       Follow-ups after your visit        Your next 10 appointments already scheduled     Sep 08, 2017 11:15 AM CDT   Nurse Only with ALLERGY [facility] ([facility])    [address] [address]   [phone]           Every allergy patient MUST wait 30 minutes after their allergy shot. No exceptions.  Xolair shots #1-3 should plan to wait 2 hours in clinic Xolair shots after #4 should plan 30 minute wait in clinic            Sep 15, 2017 11:15 AM CDT   Nurse Only with ALLERGY [facility] ([facility])    [address] [address]   [phone]           Every allergy patient MUST wait 30 minutes after their allergy shot. No exceptions.  Xolair shots #1-3 should plan to wait 2 hours in clinic Xolair shots after #4 should plan 30 minute wait in clinic            Sep 22, 2017 11:15 AM CDT   Nurse Only with ALLERGY [facility] ([facility])    [address] [address]   [phone]           Every allergy patient MUST wait 30 minutes after their allergy shot. No exceptions.  Xolair shots #1-3 should plan to wait 2 hours in clinic Xolair shots after #4 should plan 30 minute wait in clinic            Sep 29, 2017 11:15 AM CDT   Nurse Only with ALLERGY [facility] ([facility])    [address] [address]   [phone]           Every allergy patient MUST wait 30 minutes after their allergy shot. No exceptions.  Xolair shots #1-3 should plan  to wait 2 hours in clinic Xolair shots after #4 should plan 30 minute wait in clinic            Oct 06, 2017 11:15 AM CDT   Nurse Only with ALLERGY [facility] ([facility])    [address] [place] [address]   [phone]           Every allergy patient MUST wait 30 minutes after their allergy shot. No exceptions.  Xolair shots #1-3 should plan to wait 2 hours in clinic Xolair shots after #4 should plan 30 minute wait in clinic            Oct 13, 2017 11:15 AM CDT   Nurse Only with ALLERGY [facility] ([facility])    [address] [place] [address]   [phone]           Every allergy patient MUST wait 30 minutes after their allergy shot. No exceptions.  Xolair shots #1-3 should plan to wait 2 hours in clinic Xolair shots after #4 should plan 30 minute wait in clinic            Oct 20, 2017 11:15 AM CDT   Nurse Only with ALLERGY [facility] ([facility])    [address] [place] [address]   [phone]           Every allergy patient MUST wait 30 minutes after their allergy shot. No exceptions.  Xolair shots #1-3 should plan to wait 2 hours in clinic Xolair shots after #4 should plan 30 minute wait in clinic            Oct 27, 2017 11:15 AM CDT   Nurse Only with ALLERGY [facility] ([facility])    [address] [place] [address]   [phone]           Every allergy patient MUST wait 30 minutes after their allergy shot. No exceptions.  Xolair shots #1-3 should plan to wait 2 hours in clinic Xolair shots after #4 should plan 30 minute wait in clinic            Nov 03, 2017 11:15 AM CDT   Nurse Only with ALLERGY [facility] ([facility])    [address] [place] [address]   [phone]           Every allergy patient MUST wait 30  "minutes after their allergy shot. No exceptions.  Xolair shots #1-3 should plan to wait 2 hours in clinic Xolair shots after #4 should plan 30 minute wait in clinic            Nov 10, 2017 11:15 AM CST   Nurse Only with ALLERGY [facility] ([facility])    [address] [address]   [phone]           Every allergy patient MUST wait 30 minutes after their allergy shot. No exceptions.  Xolair shots #1-3 should plan to wait 2 hours in clinic Xolair shots after #4 should plan 30 minute wait in clinic              Who to contact     If you have questions or need follow up information about today's clinic visit or your schedule please contact [facility] directly at [phone].  Normal or non-critical lab and imaging results will be communicated to you by Wedge Busterhart, letter or phone within 4 business days after the clinic has received the results. If you do not hear from us within 7 days, please contact the clinic through Wedge Busterhart or phone. If you have a critical or abnormal lab result, we will notify you by phone as soon as possible.  Submit refill requests through [employer] or call your pharmacy and they will forward the refill request to us. Please allow 3 business days for your refill to be completed.          Additional Information About Your Visit        [employer] Information     [employer] lets you send messages to your doctor, view your test results, renew your prescriptions, schedule appointments and more. To sign up, go to [email or web address] . Click on \"Log in\" on the left side of the screen, which will take you to the Welcome page. Then click on \"Sign up Now\" on the right side of the page.     You will be asked to enter the access code listed below, as well as some personal information. Please follow the directions to create your username and password.     Your access code is: [email or web address]  Expires: 10/19/2017 11:52 AM     Your access " code will  in 90 days. If you need help or a new code, please call your [place] clinic or [phone].        Care EveryWhere ID     This is your Care EveryWhere ID. This could be used by other organizations to access your [place] medical records  [id number]         Blood Pressure from Last 3 Encounters:   17 124/78   17 131/84   17 124/79    Weight from Last 3 Encounters:   17 168 lb 14.4 oz (76.6 kg)   17 170 lb 10.2 oz (77.4 kg)   16 160 lb (72.6 kg)              We Performed the Following     Allergy Shot: One injection        Primary Care Provider Office Phone #    [facility] [phone]       No address on file        Equal Access to Services     EDUIN PENNINGTON : Hadii aurelio sanders Sonikko, waaxda luqadaha, qaybta kaalmada adeegyada, josiah rahman . So [facility] [phone].    ATENCIÓN: Si habla español, tiene a spencer disposición servicios gratuitos de asistencia lingüística. Llame al [phone].    We comply with applicable federal civil rights laws and [place] laws. We do not discriminate on the basis of race, color, national origin, age, disability sex, sexual orientation or gender identity.            Thank you!     Thank you for choosing [facility]  for your care. Our goal is always to provide you with excellent care. Hearing back from our patients is one way we can continue to improve our services. Please take a few minutes to complete the written survey that you may receive in the mail after your visit with us. Thank you!             Your Updated Medication List - Protect others around you: Learn how to safely use, store and throw away your medicines at www.disposemymeds.org.          This list is accurate as of: 17 11:58 AM.  Always use your most recent med list.                   Brand Name Dispense Instructions for use Diagnosis    ADVIL 200 MG capsule   Generic drug:  ibuprofen      Take  200 mg by mouth every 4 hours as needed Reported on 4/28/2017        albuterol 108 (90 BASE) MCG/ACT Inhaler    PROAIR HFA/PROVENTIL HFA/VENTOLIN HFA    1 Inhaler    Inhale 2 puffs into the lungs every 4 hours as needed    Intermittent asthma, uncomplicated       * ALLERGEN IMMUNOTHERAPY PRESCRIPTION     5 mL    Name of Mix: Mix #1  Cat, Dog Cat Hair, Standardized 10,000 BAU/mL, ALK  2.0 ml Dog Hair Dander, A. P.  1:100 w/v, HS  1.0 ml  Diluent: HSA qs to 5ml    Seasonal allergic rhinitis due to pollen, Non-seasonal allergic rhinitis due to animal hair and dander, Chronic allergic conjunctivitis, Need for desensitization to allergens       * ALLERGEN IMMUNOTHERAPY PRESCRIPTION     5 mL    Name of Mix: Mix #2  Tree  Birch Mix GLY 1:20 w/v, HS  0.5 ml Boxelder-Maple  Mix BHR (Boxelder Hard Red) 1:20 w/v, HS  0.5 ml [place] Mix GLY 1:20 w/v, HS 0.5 ml Oak Mix RVW GLY 1:20 w/v, HS 0.5 ml [place] Tree, Black GLY 1:20 w/v, HS 0.5 ml [place], Black GLY 1:20 w/v, HS 0.5 ml Diluent: HSA qs to 5ml    Seasonal allergic rhinitis due to pollen, Non-seasonal allergic rhinitis due to animal hair and dander, Chronic allergic conjunctivitis, Need for desensitization to allergens       EPINEPHrine 0.3 MG/0.3ML injection 2-pack    AUVI-Q    0.6 mL    Inject 0.3 mLs (0.3 mg) into the muscle as needed for anaphylaxis    Seasonal allergic rhinitis due to pollen, Non-seasonal allergic rhinitis due to animal hair and dander       fluticasone 50 MCG/ACT spray    FLONASE    1 Bottle    Spray 2 sprays into both nostrils daily    Atopic rhinitis       * Notice:  This list has 2 medication(s) that are the same as other medications prescribed for you. Read the directions carefully, and ask your doctor or other care provider to review them with you.

## 2017-09-08 ENCOUNTER — ALLIED HEALTH/NURSE VISIT (OUTPATIENT)
Dept: ALLERGY | Facility: CLINIC | Age: 27
End: 2017-09-08
Payer: COMMERCIAL

## 2017-09-08 DIAGNOSIS — J30.9 ALLERGIC RHINITIS, UNSPECIFIED: Primary | ICD-10-CM

## 2017-09-08 PROCEDURE — 95115 IMMUNOTHERAPY ONE INJECTION: CPT

## 2017-09-08 NOTE — MR AVS SNAPSHOT
After Visit Summary   9/8/2017    Dmitri Mcguire    MRN: [id number]           Patient Information     Date Of Birth          1990        Visit Information        Provider Department      9/8/2017 11:15 AM ALLERGY [facility]        Today's Diagnoses     Allergic rhinitis, unspecified    -  1       Follow-ups after your visit        Your next 10 appointments already scheduled     Sep 15, 2017 11:15 AM CDT   Nurse Only with ALLERGY [facility] ([facility])    [address] [address]   [phone]           Every allergy patient MUST wait 30 minutes after their allergy shot. No exceptions.  Xolair shots #1-3 should plan to wait 2 hours in clinic Xolair shots after #4 should plan 30 minute wait in clinic            Sep 22, 2017 11:15 AM CDT   Nurse Only with ALLERGY [facility] ([facility])    [address] [address]   [phone]           Every allergy patient MUST wait 30 minutes after their allergy shot. No exceptions.  Xolair shots #1-3 should plan to wait 2 hours in clinic Xolair shots after #4 should plan 30 minute wait in clinic            Sep 29, 2017 11:15 AM CDT   Nurse Only with ALLERGY [facility] ([facility])    [address] [address]   [phone]           Every allergy patient MUST wait 30 minutes after their allergy shot. No exceptions.  Xolair shots #1-3 should plan to wait 2 hours in clinic Xolair shots after #4 should plan 30 minute wait in clinic            Oct 06, 2017 11:15 AM CDT   Nurse Only with ALLERGY [facility] ([facility])    [address] [address]   [phone]           Every allergy patient MUST wait 30 minutes after their allergy shot. No exceptions.  Xolair shots #1-3 should plan  to wait 2 hours in clinic Xolair shots after #4 should plan 30 minute wait in clinic            Oct 13, 2017 11:15 AM CDT   Nurse Only with ALLERGY [facility] ([facility])    [address] [place] [address]   [phone]           Every allergy patient MUST wait 30 minutes after their allergy shot. No exceptions.  Xolair shots #1-3 should plan to wait 2 hours in clinic Xolair shots after #4 should plan 30 minute wait in clinic            Oct 20, 2017 11:15 AM CDT   Nurse Only with ALLERGY [facility] ([facility])    [address] [place] [address]   [phone]           Every allergy patient MUST wait 30 minutes after their allergy shot. No exceptions.  Xolair shots #1-3 should plan to wait 2 hours in clinic Xolair shots after #4 should plan 30 minute wait in clinic            Oct 27, 2017 11:15 AM CDT   Nurse Only with ALLERGY [facility] ([facility])    [address] [place] [address]   [phone]           Every allergy patient MUST wait 30 minutes after their allergy shot. No exceptions.  Xolair shots #1-3 should plan to wait 2 hours in clinic Xolair shots after #4 should plan 30 minute wait in clinic            Nov 03, 2017 11:15 AM CDT   Nurse Only with ALLERGY [facility] ([facility])    [address] [place] [address]   [phone]           Every allergy patient MUST wait 30 minutes after their allergy shot. No exceptions.  Xolair shots #1-3 should plan to wait 2 hours in clinic Xolair shots after #4 should plan 30 minute wait in clinic            Nov 10, 2017 11:15 AM CST   Nurse Only with ALLERGY [facility] ([facility])    [address] [place] [address]   [phone]           Every allergy patient MUST wait 30  "minutes after their allergy shot. No exceptions.  Xolair shots #1-3 should plan to wait 2 hours in clinic Xolair shots after #4 should plan 30 minute wait in clinic            Nov 17, 2017 11:15 AM CST   Nurse Only with ALLERGY [facility] ([facility])    [address] [address]   [phone]           Every allergy patient MUST wait 30 minutes after their allergy shot. No exceptions.  Xolair shots #1-3 should plan to wait 2 hours in clinic Xolair shots after #4 should plan 30 minute wait in clinic              Who to contact     If you have questions or need follow up information about today's clinic visit or your schedule please contact [facility] directly at [phone].  Normal or non-critical lab and imaging results will be communicated to you by [employer], letter or phone within 4 business days after the clinic has received the results. If you do not hear from us within 7 days, please contact the clinic through [employer] or phone. If you have a critical or abnormal lab result, we will notify you by phone as soon as possible.  Submit refill requests through [employer] or call your pharmacy and they will forward the refill request to us. Please allow 3 business days for your refill to be completed.          Additional Information About Your Visit        [employer] Information     [employer] lets you send messages to your doctor, view your test results, renew your prescriptions, schedule appointments and more. To sign up, go to [email or web address] . Click on \"Log in\" on the left side of the screen, which will take you to the Welcome page. Then click on \"Sign up Now\" on the right side of the page.     You will be asked to enter the access code listed below, as well as some personal information. Please follow the directions to create your username and password.     Your access code is: [email or web address]  Expires: 10/19/2017 11:52 AM     Your access " code will  in 90 days. If you need help or a new code, please call your [place] clinic or [phone].        Care EveryWhere ID     This is your Care EveryWhere ID. This could be used by other organizations to access your [place] medical records  [id number]         Blood Pressure from Last 3 Encounters:   17 124/78   17 131/84   17 124/79    Weight from Last 3 Encounters:   17 168 lb 14.4 oz (76.6 kg)   17 170 lb 10.2 oz (77.4 kg)   16 160 lb (72.6 kg)              We Performed the Following     Allergy Shot: One injection        Primary Care Provider Office Phone #    [facility] [phone]       No address on file        Equal Access to Services     EDUIN PENNINGTON : Hadii aurelio sanders Sonikko, waaxda luqadaha, qaybta kaalmada adeegyada, josiah rahman . So [facility] [phone].    ATENCIÓN: Si habla español, tiene a spencer disposición servicios gratuitos de asistencia lingüística. Llame al [phone].    We comply with applicable federal civil rights laws and [place] laws. We do not discriminate on the basis of race, color, national origin, age, disability sex, sexual orientation or gender identity.            Thank you!     Thank you for choosing [facility]  for your care. Our goal is always to provide you with excellent care. Hearing back from our patients is one way we can continue to improve our services. Please take a few minutes to complete the written survey that you may receive in the mail after your visit with us. Thank you!             Your Updated Medication List - Protect others around you: Learn how to safely use, store and throw away your medicines at www.disposemymeds.org.          This list is accurate as of: 17 11:50 AM.  Always use your most recent med list.                   Brand Name Dispense Instructions for use Diagnosis    ADVIL 200 MG capsule   Generic drug:  ibuprofen      Take  200 mg by mouth every 4 hours as needed Reported on 4/28/2017        albuterol 108 (90 BASE) MCG/ACT Inhaler    PROAIR HFA/PROVENTIL HFA/VENTOLIN HFA    1 Inhaler    Inhale 2 puffs into the lungs every 4 hours as needed    Intermittent asthma, uncomplicated       * ALLERGEN IMMUNOTHERAPY PRESCRIPTION     5 mL    Name of Mix: Mix #1  Cat, Dog Cat Hair, Standardized 10,000 BAU/mL, ALK  2.0 ml Dog Hair Dander, A. P.  1:100 w/v, HS  1.0 ml  Diluent: HSA qs to 5ml    Seasonal allergic rhinitis due to pollen, Non-seasonal allergic rhinitis due to animal hair and dander, Chronic allergic conjunctivitis, Need for desensitization to allergens       * ALLERGEN IMMUNOTHERAPY PRESCRIPTION     5 mL    Name of Mix: Mix #2  Tree  Birch Mix GLY 1:20 w/v, HS  0.5 ml Boxelder-Maple  Mix BHR (Boxelder Hard Red) 1:20 w/v, HS  0.5 ml [place] Mix GLY 1:20 w/v, HS 0.5 ml Oak Mix RVW GLY 1:20 w/v, HS 0.5 ml [place] Tree, Black GLY 1:20 w/v, HS 0.5 ml [place], Black GLY 1:20 w/v, HS 0.5 ml Diluent: HSA qs to 5ml    Seasonal allergic rhinitis due to pollen, Non-seasonal allergic rhinitis due to animal hair and dander, Chronic allergic conjunctivitis, Need for desensitization to allergens       EPINEPHrine 0.3 MG/0.3ML injection 2-pack    AUVI-Q    0.6 mL    Inject 0.3 mLs (0.3 mg) into the muscle as needed for anaphylaxis    Seasonal allergic rhinitis due to pollen, Non-seasonal allergic rhinitis due to animal hair and dander       fluticasone 50 MCG/ACT spray    FLONASE    1 Bottle    Spray 2 sprays into both nostrils daily    Atopic rhinitis       * Notice:  This list has 2 medication(s) that are the same as other medications prescribed for you. Read the directions carefully, and ask your doctor or other care provider to review them with you.

## 2017-09-15 ENCOUNTER — ALLIED HEALTH/NURSE VISIT (OUTPATIENT)
Dept: ALLERGY | Facility: CLINIC | Age: 27
End: 2017-09-15
Payer: COMMERCIAL

## 2017-09-15 DIAGNOSIS — J30.1 CHRONIC SEASONAL ALLERGIC RHINITIS DUE TO POLLEN: ICD-10-CM

## 2017-09-15 DIAGNOSIS — H10.45 CHRONIC ALLERGIC CONJUNCTIVITIS: ICD-10-CM

## 2017-09-15 DIAGNOSIS — J30.9 ALLERGIC RHINITIS, UNSPECIFIED: Primary | ICD-10-CM

## 2017-09-15 DIAGNOSIS — J30.81 NON-SEASONAL ALLERGIC RHINITIS DUE TO ANIMAL HAIR AND DANDER: ICD-10-CM

## 2017-09-15 DIAGNOSIS — Z51.6 NEED FOR DESENSITIZATION TO ALLERGENS: ICD-10-CM

## 2017-09-15 PROCEDURE — 95115 IMMUNOTHERAPY ONE INJECTION: CPT

## 2017-09-15 NOTE — PROGRESS NOTES
ALLERGY SOLUTION NEW REQUEST    Dmitri Mcguire 1990 MRN: [id number]    DATE NEEDED:  ASAP  Vial Color Content   Top Dose         Vial Size  Green 1:1,000                          Trees                                     Red 1:1 0.5              5mL  Blue 1:100                          Trees                                     Red 1:1 0.5              5mL  Yellow 1:10                          Trees                                     Red 1:1 0.5              5mL  Red 1:1                          Trees                                     Red 1:1 0.5               5mL            Shot Clinic Location:  [place]  Ship to Location: [place]  Special Instructions:  This is the second extract for the patient. He is already getting IT fot cat and dog antigens.        Requester Signature  Carlo Alegre

## 2017-09-15 NOTE — MR AVS SNAPSHOT
After Visit Summary   9/15/2017    Dmitri Mcguire    MRN: [id number]           Patient Information     Date Of Birth          1990        Visit Information        Provider Department      9/15/2017 11:15 AM ALLERGY [facility]        Today's Diagnoses     Allergic rhinitis, unspecified    -  1       Follow-ups after your visit        Your next 10 appointments already scheduled     Sep 22, 2017 11:15 AM CDT   Nurse Only with ALLERGY [facility] ([facility])    [address] [address]   [phone]           Every allergy patient MUST wait 30 minutes after their allergy shot. No exceptions.  Xolair shots #1-3 should plan to wait 2 hours in clinic Xolair shots after #4 should plan 30 minute wait in clinic            Sep 29, 2017 11:15 AM CDT   Nurse Only with ALLERGY [facility] ([facility])    [address] [address]   [phone]           Every allergy patient MUST wait 30 minutes after their allergy shot. No exceptions.  Xolair shots #1-3 should plan to wait 2 hours in clinic Xolair shots after #4 should plan 30 minute wait in clinic            Oct 06, 2017 11:15 AM CDT   Nurse Only with ALLERGY [facility] ([facility])    [address] [address]   [phone]           Every allergy patient MUST wait 30 minutes after their allergy shot. No exceptions.  Xolair shots #1-3 should plan to wait 2 hours in clinic Xolair shots after #4 should plan 30 minute wait in clinic            Oct 13, 2017 11:15 AM CDT   Nurse Only with ALLERGY [facility] ([facility])    [address] [address]   [phone]           Every allergy patient MUST wait 30 minutes after their allergy shot. No exceptions.  Xolair shots #1-3 should plan  to wait 2 hours in clinic Xolair shots after #4 should plan 30 minute wait in clinic            Oct 20, 2017 11:15 AM CDT   Nurse Only with ALLERGY [facility] ([facility])    [address] [place] [address]   [phone]           Every allergy patient MUST wait 30 minutes after their allergy shot. No exceptions.  Xolair shots #1-3 should plan to wait 2 hours in clinic Xolair shots after #4 should plan 30 minute wait in clinic            Oct 27, 2017 11:15 AM CDT   Nurse Only with ALLERGY [facility] ([facility])    [address] [place] [address]   [phone]           Every allergy patient MUST wait 30 minutes after their allergy shot. No exceptions.  Xolair shots #1-3 should plan to wait 2 hours in clinic Xolair shots after #4 should plan 30 minute wait in clinic            Nov 03, 2017 11:15 AM CDT   Nurse Only with ALLERGY [facility] ([facility])    [address] [place] [address]   [phone]           Every allergy patient MUST wait 30 minutes after their allergy shot. No exceptions.  Xolair shots #1-3 should plan to wait 2 hours in clinic Xolair shots after #4 should plan 30 minute wait in clinic            Nov 10, 2017 11:15 AM CST   Nurse Only with ALLERGY [facility] ([facility])    [address] [place] [address]   [phone]           Every allergy patient MUST wait 30 minutes after their allergy shot. No exceptions.  Xolair shots #1-3 should plan to wait 2 hours in clinic Xolair shots after #4 should plan 30 minute wait in clinic            Nov 17, 2017 11:15 AM CST   Nurse Only with ALLERGY [facility] ([facility])    [address] [place] [address]   [phone]           Every allergy patient MUST wait 30  "minutes after their allergy shot. No exceptions.  Xolair shots #1-3 should plan to wait 2 hours in clinic Xolair shots after #4 should plan 30 minute wait in clinic            Nov 24, 2017 11:15 AM CST   Nurse Only with ALLERGY [facility] ([facility])    [address] [address]   [phone]           Every allergy patient MUST wait 30 minutes after their allergy shot. No exceptions.  Xolair shots #1-3 should plan to wait 2 hours in clinic Xolair shots after #4 should plan 30 minute wait in clinic              Who to contact     If you have questions or need follow up information about today's clinic visit or your schedule please contact [facility] directly at [phone].  Normal or non-critical lab and imaging results will be communicated to you by HigherNexthart, letter or phone within 4 business days after the clinic has received the results. If you do not hear from us within 7 days, please contact the clinic through HigherNexthart or phone. If you have a critical or abnormal lab result, we will notify you by phone as soon as possible.  Submit refill requests through Mouth Party or call your pharmacy and they will forward the refill request to us. Please allow 3 business days for your refill to be completed.          Additional Information About Your Visit        HigherNextharPerillon Software Information     Mouth Party lets you send messages to your doctor, view your test results, renew your prescriptions, schedule appointments and more. To sign up, go to [email or web address] . Click on \"Log in\" on the left side of the screen, which will take you to the Welcome page. Then click on \"Sign up Now\" on the right side of the page.     You will be asked to enter the access code listed below, as well as some personal information. Please follow the directions to create your username and password.     Your access code is: [email or web address]  Expires: 10/19/2017 11:52 AM     Your access " code will  in 90 days. If you need help or a new code, please call your [place] clinic or [phone].        Care EveryWhere ID     This is your Care EveryWhere ID. This could be used by other organizations to access your [place] medical records  [id number]         Blood Pressure from Last 3 Encounters:   17 124/78   17 131/84   17 124/79    Weight from Last 3 Encounters:   17 168 lb 14.4 oz (76.6 kg)   17 170 lb 10.2 oz (77.4 kg)   16 160 lb (72.6 kg)              We Performed the Following     Allergy Shot: One injection        Primary Care Provider Office Phone #    [facility] [phone]       No address on file        Equal Access to Services     EDUIN PENNINGTON : Hadii aurelio sanders Sonikko, waaxda luqadaha, qaybta kaalmada adeegyazita, josiah rahman . So [facility] [phone].    ATENCIÓN: Si habla español, tiene a spencer disposición servicios gratuitos de asistencia lingüística. Llame al [phone].    We comply with applicable federal civil rights laws and [place] laws. We do not discriminate on the basis of race, color, national origin, age, disability sex, sexual orientation or gender identity.            Thank you!     Thank you for choosing [facility]  for your care. Our goal is always to provide you with excellent care. Hearing back from our patients is one way we can continue to improve our services. Please take a few minutes to complete the written survey that you may receive in the mail after your visit with us. Thank you!             Your Updated Medication List - Protect others around you: Learn how to safely use, store and throw away your medicines at www.disposemymeds.org.          This list is accurate as of: 9/15/17 11:56 AM.  Always use your most recent med list.                   Brand Name Dispense Instructions for use Diagnosis    ADVIL 200 MG capsule   Generic drug:  ibuprofen      Take  200 mg by mouth every 4 hours as needed Reported on 4/28/2017        albuterol 108 (90 BASE) MCG/ACT Inhaler    PROAIR HFA/PROVENTIL HFA/VENTOLIN HFA    1 Inhaler    Inhale 2 puffs into the lungs every 4 hours as needed    Intermittent asthma, uncomplicated       * ALLERGEN IMMUNOTHERAPY PRESCRIPTION     5 mL    Name of Mix: Mix #1  Cat, Dog Cat Hair, Standardized 10,000 BAU/mL, ALK  2.0 ml Dog Hair Dander, A. P.  1:100 w/v, HS  1.0 ml  Diluent: HSA qs to 5ml    Seasonal allergic rhinitis due to pollen, Non-seasonal allergic rhinitis due to animal hair and dander, Chronic allergic conjunctivitis, Need for desensitization to allergens       * ALLERGEN IMMUNOTHERAPY PRESCRIPTION     5 mL    Name of Mix: Mix #2  Tree  Birch Mix GLY 1:20 w/v, HS  0.5 ml Boxelder-Maple  Mix BHR (Boxelder Hard Red) 1:20 w/v, HS  0.5 ml [place] Mix GLY 1:20 w/v, HS 0.5 ml Oak Mix RVW GLY 1:20 w/v, HS 0.5 ml [place] Tree, Black GLY 1:20 w/v, HS 0.5 ml [place], Black GLY 1:20 w/v, HS 0.5 ml Diluent: HSA qs to 5ml    Seasonal allergic rhinitis due to pollen, Non-seasonal allergic rhinitis due to animal hair and dander, Chronic allergic conjunctivitis, Need for desensitization to allergens       EPINEPHrine 0.3 MG/0.3ML injection 2-pack    AUVI-Q    0.6 mL    Inject 0.3 mLs (0.3 mg) into the muscle as needed for anaphylaxis    Seasonal allergic rhinitis due to pollen, Non-seasonal allergic rhinitis due to animal hair and dander       fluticasone 50 MCG/ACT spray    FLONASE    1 Bottle    Spray 2 sprays into both nostrils daily    Atopic rhinitis       * Notice:  This list has 2 medication(s) that are the same as other medications prescribed for you. Read the directions carefully, and ask your doctor or other care provider to review them with you.

## 2017-09-21 DIAGNOSIS — J30.2 SEASONAL ALLERGIC RHINITIS: Primary | ICD-10-CM

## 2017-09-21 PROCEDURE — 95165 ANTIGEN THERAPY SERVICES: CPT | Performed by: ALLERGY & IMMUNOLOGY

## 2017-09-21 NOTE — PROGRESS NOTES
Allergy serums received at [place].     Vials received below:    Vial Color Content                       Vial Size Expiration Date  Green 1:1,000, Blue 1:100, Yellow 1:10 and Red 1:1 Trees 5mL  3/20/18 & 9/20/18        Signature  Rosalie Stringer

## 2017-09-21 NOTE — PROGRESS NOTES
Allergy serums billed at [place].     Vials received below:    Vial Color Content                      Vial Size Expiration Date  Green 1:1,000, Blue 1:100, Yellow 1:10 and Red 1:1 Trees 5mL  3/20/18 & 9/20/18      Original Refill encounter date: 9/15/17      Signature  Rosalie Stringer

## 2017-09-22 ENCOUNTER — ALLIED HEALTH/NURSE VISIT (OUTPATIENT)
Dept: ALLERGY | Facility: CLINIC | Age: 27
End: 2017-09-22
Payer: COMMERCIAL

## 2017-09-22 DIAGNOSIS — J30.9 ALLERGIC RHINITIS, UNSPECIFIED: Primary | ICD-10-CM

## 2017-09-22 PROCEDURE — 95117 IMMUNOTHERAPY INJECTIONS: CPT

## 2017-09-22 NOTE — MR AVS SNAPSHOT
After Visit Summary   9/22/2017    Dmitri Mcguire    MRN: [id number]           Patient Information     Date Of Birth          1990        Visit Information        Provider Department      9/22/2017 11:15 AM ALLERGY [facility]        Today's Diagnoses     Allergic rhinitis, unspecified    -  1       Follow-ups after your visit        Your next 10 appointments already scheduled     Sep 26, 2017  5:30 PM CDT   Nurse Only with ALLERGY [facility] ([facility])    [address] [address]   [phone]           Every allergy patient MUST wait 30 minutes after their allergy shot. No exceptions.  Xolair shots #1-3 should plan to wait 2 hours in clinic Xolair shots after #4 should plan 30 minute wait in clinic            Sep 29, 2017 11:15 AM CDT   Nurse Only with ALLERGY [facility] ([facility])    [address] [address]   [phone]           Every allergy patient MUST wait 30 minutes after their allergy shot. No exceptions.  Xolair shots #1-3 should plan to wait 2 hours in clinic Xolair shots after #4 should plan 30 minute wait in clinic            Sep 29, 2017 11:40 AM CDT   Return Visit with Carlo Alegre MD   [facility] ([facility]    [address] [address]   [phone]            Oct 03, 2017  5:15 PM CDT   Nurse Only with ALLERGY [facility] ([facility])    [address] [address]   [phone]           Every allergy patient MUST wait 30 minutes after their allergy shot. No exceptions.  Xolair shots #1-3 should plan to wait 2 hours in clinic Xolair shots after #4 should plan 30 minute wait in clinic            Oct 06, 2017 11:15 AM CDT   Nurse Only with ALLERGY [facility] ([facility]  [place])    [address] [facility] [place] [address]   [phone]           Every allergy patient MUST wait 30 minutes after their allergy shot. No exceptions.  Xolair shots #1-3 should plan to wait 2 hours in clinic Xolair shots after #4 should plan 30 minute wait in clinic            Oct 10, 2017  4:45 PM CDT   Nurse Only with ALLERGY [facility] ([facility])    [address] [facility] [place] [address]   [phone]           Every allergy patient MUST wait 30 minutes after their allergy shot. No exceptions.  Xolair shots #1-3 should plan to wait 2 hours in clinic Xolair shots after #4 should plan 30 minute wait in clinic            Oct 13, 2017 11:15 AM CDT   Nurse Only with ALLERGY [facility] ([facility])    [address] [facility] [address]   [phone]           Every allergy patient MUST wait 30 minutes after their allergy shot. No exceptions.  Xolair shots #1-3 should plan to wait 2 hours in clinic Xolair shots after #4 should plan 30 minute wait in clinic            Oct 17, 2017  5:15 PM CDT   Nurse Only with ALLERGY [facility] ([facility])    [address] [address]   [phone]           Every allergy patient MUST wait 30 minutes after their allergy shot. No exceptions.  Xolair shots #1-3 should plan to wait 2 hours in clinic Xolair shots after #4 should plan 30 minute wait in clinic            Oct 20, 2017 11:15 AM CDT   Nurse Only with ALLERGY [facility] ([facility])    [address] [facility] [place] [address]   [phone]           Every allergy patient MUST wait 30 minutes after their allergy shot. No exceptions.  Xolair shots #1-3 should plan to wait 2 hours in clinic Xolair shots after #4 should plan 30 minute wait in clinic            Oct 24, 2017  4:30 PM CDT   Nurse  "Only with ALLERGY [facility] ([facility])    [address]  [facility] [id number] [phone]           Every allergy patient MUST wait 30 minutes after their allergy shot. No exceptions.  Xolair shots #1-3 should plan to wait 2 hours in clinic Xolair shots after #4 should plan 30 minute wait in clinic              Who to contact     If you have questions or need follow up information about today's clinic visit or your schedule please contact [facility] directly at [phone].  Normal or non-critical lab and imaging results will be communicated to you by Yee Carehart, letter or phone within 4 business days after the clinic has received the results. If you do not hear from us within 7 days, please contact the clinic through Yee Carehart or phone. If you have a critical or abnormal lab result, we will notify you by phone as soon as possible.  Submit refill requests through Net Orange or call your pharmacy and they will forward the refill request to us. Please allow 3 business days for your refill to be completed.          Additional Information About Your Visit        Yee CareharFieldoo Information     Net Orange lets you send messages to your doctor, view your test results, renew your prescriptions, schedule appointments and more. To sign up, go to [email or web address] . Click on \"Log in\" on the left side of the screen, which will take you to the Welcome page. Then click on \"Sign up Now\" on the right side of the page.     You will be asked to enter the access code listed below, as well as some personal information. Please follow the directions to create your username and password.     Your access code is: [email or web address]  Expires: 10/19/2017 11:52 AM     Your access code will  in 90 days. If you need help or a new code, please call your [facility] or [phone].        Care EveryWhere ID     This is your Care EveryWhere ID. This could be used by other " organizations to access your [place] medical records  [id number]         Blood Pressure from Last 3 Encounters:   06/01/17 124/78   04/28/17 131/84   04/21/17 124/79    Weight from Last 3 Encounters:   06/01/17 168 lb 14.4 oz (76.6 kg)   04/21/17 170 lb 10.2 oz (77.4 kg)   04/25/16 160 lb (72.6 kg)              We Performed the Following     Allergy Shot: Two or more injections        Primary Care Provider Office Phone #    [facility] [phone]       No address on file        Equal Access to Services     EDUIN PENNINGTON : Hadii aurelio Lyle, wapaoda louisqadaha, jose kafarooq lowery, josiah rahman . So [facility] [phone].    ATENCIÓN: Si habla español, tiene a spencer disposición servicios gratuitos de asistencia lingüística. Llame al [phone].    We comply with applicable federal civil rights laws and [place] laws. We do not discriminate on the basis of race, color, national origin, age, disability sex, sexual orientation or gender identity.            Thank you!     Thank you for choosing [facility]  for your care. Our goal is always to provide you with excellent care. Hearing back from our patients is one way we can continue to improve our services. Please take a few minutes to complete the written survey that you may receive in the mail after your visit with us. Thank you!             Your Updated Medication List - Protect others around you: Learn how to safely use, store and throw away your medicines at www.disposemymeds.org.          This list is accurate as of: 9/22/17 11:55 AM.  Always use your most recent med list.                   Brand Name Dispense Instructions for use Diagnosis    ADVIL 200 MG capsule   Generic drug:  ibuprofen      Take 200 mg by mouth every 4 hours as needed Reported on 4/28/2017        albuterol 108 (90 BASE) MCG/ACT Inhaler    PROAIR HFA/PROVENTIL HFA/VENTOLIN HFA    1 Inhaler    Inhale 2 puffs into the  lungs every 4 hours as needed    Intermittent asthma, uncomplicated       * ALLERGEN IMMUNOTHERAPY PRESCRIPTION     5 mL    Name of Mix: Mix #1  Cat, Dog Cat Hair, Standardized 10,000 BAU/mL, ALK  2.0 ml Dog Hair Dander, A. P.  1:100 w/v, HS  1.0 ml  Diluent: HSA qs to 5ml    Seasonal allergic rhinitis due to pollen, Non-seasonal allergic rhinitis due to animal hair and dander, Chronic allergic conjunctivitis, Need for desensitization to allergens       * ALLERGEN IMMUNOTHERAPY PRESCRIPTION     5 mL    Name of Mix: Mix #2  Tree  Birch Mix GLY 1:20 w/v, HS  0.5 ml Boxelder-Maple  Mix BHR (Boxelder Hard Red) 1:20 w/v, HS  0.5 ml [place] Mix GLY 1:20 w/v, HS 0.5 ml Oak Mix RVW GLY 1:20 w/v, HS 0.5 ml [place] Tree, Black GLY 1:20 w/v, HS 0.5 ml [place], Black GLY 1:20 w/v, HS 0.5 ml Diluent: HSA qs to 5ml    Chronic seasonal allergic rhinitis due to pollen, Non-seasonal allergic rhinitis due to animal hair and dander, Chronic allergic conjunctivitis, Need for desensitization to allergens       EPINEPHrine 0.3 MG/0.3ML injection 2-pack    AUVI-Q    0.6 mL    Inject 0.3 mLs (0.3 mg) into the muscle as needed for anaphylaxis    Seasonal allergic rhinitis due to pollen, Non-seasonal allergic rhinitis due to animal hair and dander       fluticasone 50 MCG/ACT spray    FLONASE    1 Bottle    Spray 2 sprays into both nostrils daily    Atopic rhinitis       * Notice:  This list has 2 medication(s) that are the same as other medications prescribed for you. Read the directions carefully, and ask your doctor or other care provider to review them with you.

## 2017-09-26 ENCOUNTER — ALLIED HEALTH/NURSE VISIT (OUTPATIENT)
Dept: ALLERGY | Facility: CLINIC | Age: 27
End: 2017-09-26
Payer: COMMERCIAL

## 2017-09-26 DIAGNOSIS — J30.9 ALLERGIC RHINITIS, UNSPECIFIED: Primary | ICD-10-CM

## 2017-09-26 PROCEDURE — 95115 IMMUNOTHERAPY ONE INJECTION: CPT

## 2017-09-26 PROCEDURE — 99207 ZZC NO CHARGE LOS: CPT

## 2017-09-26 NOTE — MR AVS SNAPSHOT
After Visit Summary   9/26/2017    Dmitri Mcguire    MRN: [id number]           Patient Information     Date Of Birth          1990        Visit Information        Provider Department      9/26/2017 5:30 PM [facility]        Today's Diagnoses     Allergic rhinitis, unspecified    -  1       Follow-ups after your visit        Your next 10 appointments already scheduled     Sep 26, 2017  5:30 PM CDT   Nurse Only with [facility]ERGY [facility] ([facility])    [address] [address]   [phone]           Every allergy patient MUST wait 30 minutes after their allergy shot. No exceptions.  Xolair shots #1-3 should plan to wait 2 hours in clinic Xolair shots after #4 should plan 30 minute wait in clinic            Sep 29, 2017 11:15 AM CDT   Nurse Only with [facility]ERGY [facility] ([facility])    [address] [address]   [phone]           Every allergy patient MUST wait 30 minutes after their allergy shot. No exceptions.  Xolair shots #1-3 should plan to wait 2 hours in clinic Xolair shots after #4 should plan 30 minute wait in clinic            Sep 29, 2017 11:40 AM CDT   Return Visit with Carlo Alegre MD   [facility] ([facility])    [address] [address]   [phone]            Oct 03, 2017  5:15 PM CDT   Nurse Only with [facility]ERGY [facility] ([facility])    [address] [address]   [phone]           Every allergy patient MUST wait 30 minutes after their allergy shot. No exceptions.  Xolair shots #1-3 should plan to wait 2 hours in clinic Xolair shots after #4 should plan 30 minute wait in clinic            Oct 06, 2017 11:15 AM CDT   Nurse Only with [facility]ERGY [facility] ([facility]  [place])    [address] [place] [address]   [phone]           Every allergy patient MUST wait 30 minutes after their allergy shot. No exceptions.  Xolair shots #1-3 should plan to wait 2 hours in clinic Xolair shots after #4 should plan 30 minute wait in clinic            Oct 10, 2017  4:45 PM CDT   Nurse Only with [facility])    [address] [place] [address]   [phone]           Every allergy patient MUST wait 30 minutes after their allergy shot. No exceptions.  Xolair shots #1-3 should plan to wait 2 hours in clinic Xolair shots after #4 should plan 30 minute wait in clinic            Oct 13, 2017 11:15 AM CDT   Nurse Only with [facility] ([facility])    [address] [address]   [phone]           Every allergy patient MUST wait 30 minutes after their allergy shot. No exceptions.  Xolair shots #1-3 should plan to wait 2 hours in clinic Xolair shots after #4 should plan 30 minute wait in clinic            Oct 17, 2017  5:15 PM CDT   Nurse Only with [facility]ERGY [facility] ([facility])    [address] [address]   [phone]           Every allergy patient MUST wait 30 minutes after their allergy shot. No exceptions.  Xolair shots #1-3 should plan to wait 2 hours in clinic Xolair shots after #4 should plan 30 minute wait in clinic            Oct 20, 2017 11:15 AM CDT   Nurse Only with [facility] ([facility])    [address] [place] [address]   [phone]           Every allergy patient MUST wait 30 minutes after their allergy shot. No exceptions.  Xolair shots #1-3 should plan to wait 2 hours in clinic Xolair shots after #4 should plan 30 minute wait in clinic            Oct 24, 2017  4:30 PM CDT   Nurse  "Only with [facility]ERGY [facility] ([facility])    [address]  [facility] [id number] [phone]           Every allergy patient MUST wait 30 minutes after their allergy shot. No exceptions.  Xolair shots #1-3 should plan to wait 2 hours in clinic Xolair shots after #4 should plan 30 minute wait in clinic              Who to contact     If you have questions or need follow up information about today's clinic visit or your schedule please contact [facility] directly at [phone].  Normal or non-critical lab and imaging results will be communicated to you by Smartsheethart, letter or phone within 4 business days after the clinic has received the results. If you do not hear from us within 7 days, please contact the clinic through Smartsheethart or phone. If you have a critical or abnormal lab result, we will notify you by phone as soon as possible.  Submit refill requests through [employer] or call your pharmacy and they will forward the refill request to us. Please allow 3 business days for your refill to be completed.          Additional Information About Your Visit        SmartsheetharZalicus Information     [employer] lets you send messages to your doctor, view your test results, renew your prescriptions, schedule appointments and more. To sign up, go to [email or web address] . Click on \"Log in\" on the left side of the screen, which will take you to the Welcome page. Then click on \"Sign up Now\" on the right side of the page.     You will be asked to enter the access code listed below, as well as some personal information. Please follow the directions to create your username and password.     Your access code is: [email or web address]  Expires: 10/19/2017 11:52 AM     Your access code will  in 90 days. If you need help or a new code, please call your [facility] or [phone].        Care EveryWhere ID     This is your Care EveryWhere ID. This could be used by other " organizations to access your [place] medical records  [id number]         Blood Pressure from Last 3 Encounters:   06/01/17 124/78   04/28/17 131/84   04/21/17 124/79    Weight from Last 3 Encounters:   06/01/17 168 lb 14.4 oz (76.6 kg)   04/21/17 170 lb 10.2 oz (77.4 kg)   04/25/16 160 lb (72.6 kg)              We Performed the Following     Allergy Shot: One injection        Primary Care Provider Office Phone #    [facility] [[email or web address]] [phone]       No address on file        Equal Access to Services     EDUIN PENNINGTON : Hadii aad shilpa sanders Sonikko, waaxda luqadaha, qaybta kaalmada sebastián, josiah rahman . So [facility] [phone].    ATENCIÓN: Si habla español, tiene a spencer disposición servicios gratuitos de asistencia lingüística. Llame al [phone].    We comply with applicable federal civil rights laws and [place] laws. We do not discriminate on the basis of race, color, national origin, age, disability sex, sexual orientation or gender identity.            Thank you!     Thank you for choosing [facility]  for your care. Our goal is always to provide you with excellent care. Hearing back from our patients is one way we can continue to improve our services. Please take a few minutes to complete the written survey that you may receive in the mail after your visit with us. Thank you!             Your Updated Medication List - Protect others around you: Learn how to safely use, store and throw away your medicines at www.disposemymeds.org.          This list is accurate as of: 9/26/17  5:28 PM.  Always use your most recent med list.                   Brand Name Dispense Instructions for use Diagnosis    ADVIL 200 MG capsule   Generic drug:  ibuprofen      Take 200 mg by mouth every 4 hours as needed Reported on 4/28/2017        albuterol 108 (90 BASE) MCG/ACT Inhaler    PROAIR HFA/PROVENTIL HFA/VENTOLIN HFA    1 Inhaler    Inhale 2 puffs into the lungs every 4  hours as needed    Intermittent asthma, uncomplicated       * ALLERGEN IMMUNOTHERAPY PRESCRIPTION     5 mL    Name of Mix: Mix #1  Cat, Dog Cat Hair, Standardized 10,000 BAU/mL, ALK  2.0 ml Dog Hair Dander, A. P.  1:100 w/v, HS  1.0 ml  Diluent: HSA qs to 5ml    Seasonal allergic rhinitis due to pollen, Non-seasonal allergic rhinitis due to animal hair and dander, Chronic allergic conjunctivitis, Need for desensitization to allergens       * ALLERGEN IMMUNOTHERAPY PRESCRIPTION     5 mL    Name of Mix: Mix #2  Tree  Birch Mix GLY 1:20 w/v, HS  0.5 ml Boxelder-Maple  Mix BHR (Boxelder Hard Red) 1:20 w/v, HS  0.5 ml [place] Mix GLY 1:20 w/v, HS 0.5 ml Oak Mix RVW GLY 1:20 w/v, HS 0.5 ml [place] Tree, Black GLY 1:20 w/v, HS 0.5 ml [place], Black GLY 1:20 w/v, HS 0.5 ml Diluent: HSA qs to 5ml    Chronic seasonal allergic rhinitis due to pollen, Non-seasonal allergic rhinitis due to animal hair and dander, Chronic allergic conjunctivitis, Need for desensitization to allergens       EPINEPHrine 0.3 MG/0.3ML injection 2-pack    AUVI-Q    0.6 mL    Inject 0.3 mLs (0.3 mg) into the muscle as needed for anaphylaxis    Seasonal allergic rhinitis due to pollen, Non-seasonal allergic rhinitis due to animal hair and dander       fluticasone 50 MCG/ACT spray    FLONASE    1 Bottle    Spray 2 sprays into both nostrils daily    Atopic rhinitis       * Notice:  This list has 2 medication(s) that are the same as other medications prescribed for you. Read the directions carefully, and ask your doctor or other care provider to review them with you.

## 2017-09-29 ENCOUNTER — ALLIED HEALTH/NURSE VISIT (OUTPATIENT)
Dept: ALLERGY | Facility: CLINIC | Age: 27
End: 2017-09-29
Payer: COMMERCIAL

## 2017-09-29 ENCOUNTER — OFFICE VISIT (OUTPATIENT)
Dept: ALLERGY | Facility: CLINIC | Age: 27
End: 2017-09-29
Payer: COMMERCIAL

## 2017-09-29 VITALS
WEIGHT: 169.75 LBS | HEART RATE: 69 BPM | DIASTOLIC BLOOD PRESSURE: 78 MMHG | OXYGEN SATURATION: 97 % | BODY MASS INDEX: 26.64 KG/M2 | HEIGHT: 67 IN | TEMPERATURE: 98.1 F | SYSTOLIC BLOOD PRESSURE: 117 MMHG

## 2017-09-29 DIAGNOSIS — H10.45 CHRONIC ALLERGIC CONJUNCTIVITIS: ICD-10-CM

## 2017-09-29 DIAGNOSIS — J45.20 INTERMITTENT ASTHMA, UNCOMPLICATED: ICD-10-CM

## 2017-09-29 DIAGNOSIS — J30.9 ALLERGIC RHINITIS, UNSPECIFIED: Primary | ICD-10-CM

## 2017-09-29 DIAGNOSIS — J30.1 CHRONIC SEASONAL ALLERGIC RHINITIS DUE TO POLLEN: Primary | ICD-10-CM

## 2017-09-29 DIAGNOSIS — J30.81 NON-SEASONAL ALLERGIC RHINITIS DUE TO ANIMAL HAIR AND DANDER: ICD-10-CM

## 2017-09-29 LAB
FEF 25/75: NORMAL
FEV-1: NORMAL
FEV1/FVC: NORMAL
FVC: NORMAL

## 2017-09-29 PROCEDURE — 94010 BREATHING CAPACITY TEST: CPT | Performed by: ALLERGY & IMMUNOLOGY

## 2017-09-29 PROCEDURE — 95117 IMMUNOTHERAPY INJECTIONS: CPT

## 2017-09-29 PROCEDURE — 99214 OFFICE O/P EST MOD 30 MIN: CPT | Mod: 25 | Performed by: ALLERGY & IMMUNOLOGY

## 2017-09-29 PROCEDURE — 99207 ZZC NO CHARGE LOS: CPT

## 2017-09-29 RX ORDER — ALBUTEROL SULFATE 90 UG/1
2 AEROSOL, METERED RESPIRATORY (INHALATION) EVERY 4 HOURS PRN
COMMUNITY
End: 2020-01-14

## 2017-09-29 ASSESSMENT — ENCOUNTER SYMPTOMS
WHEEZING: 0
NAUSEA: 0
ADENOPATHY: 0
DIARRHEA: 0
SHORTNESS OF BREATH: 0
FACIAL SWELLING: 0
SINUS PRESSURE: 0
EYE ITCHING: 0
EYE DISCHARGE: 0
MYALGIAS: 0
HEADACHES: 0
EYE REDNESS: 0
CHEST TIGHTNESS: 0
RHINORRHEA: 0
FEVER: 0
COUGH: 0
FATIGUE: 0
STRIDOR: 0
VOMITING: 0

## 2017-09-29 NOTE — MR AVS SNAPSHOT
After Visit Summary   9/29/2017    Dmitri Mcguire    MRN: [id number]           Patient Information     Date Of Birth          1990        Visit Information        Provider Department      9/29/2017 11:15 AM [facility]        Today's Diagnoses     Allergic rhinitis, unspecified    -  1    Intermittent asthma, uncomplicated           Follow-ups after your visit        Your next 10 appointments already scheduled     Oct 03, 2017  5:15 PM CDT   Nurse Only with [facility] ([facility])    [address] [place] [address]   [phone]           Every allergy patient MUST wait 30 minutes after their allergy shot. No exceptions.  Xolair shots #1-3 should plan to wait 2 hours in clinic Xolair shots after #4 should plan 30 minute wait in clinic            Oct 06, 2017 11:15 AM CDT   Nurse Only with [facility] ([facility])    [address] [address]   [phone]           Every allergy patient MUST wait 30 minutes after their allergy shot. No exceptions.  Xolair shots #1-3 should plan to wait 2 hours in clinic Xolair shots after #4 should plan 30 minute wait in clinic            Oct 10, 2017  4:45 PM CDT   Nurse Only with [facility] ([facility])    [address] [place] [address]   [phone]           Every allergy patient MUST wait 30 minutes after their allergy shot. No exceptions.  Xolair shots #1-3 should plan to wait 2 hours in clinic Xolair shots after #4 should plan 30 minute wait in clinic            Oct 13, 2017 11:15 AM CDT   Nurse Only with [facility] ([facility])    [address] [place] [address]   [phone]           Every allergy patient MUST wait 30 minutes after their allergy shot. No  exceptions.  Xolair shots #1-3 should plan to wait 2 hours in clinic Xolair shots after #4 should plan 30 minute wait in clinic            Oct 17, 2017  5:15 PM CDT   Nurse Only with [facility]ERGY [facility] ([facility])    [address] [place] [address]   [phone]           Every allergy patient MUST wait 30 minutes after their allergy shot. No exceptions.  Xolair shots #1-3 should plan to wait 2 hours in clinic Xolair shots after #4 should plan 30 minute wait in clinic            Oct 20, 2017 11:15 AM CDT   Nurse Only with ALLERGY [facility] ([facility])    [address] [place] [address]   [phone]           Every allergy patient MUST wait 30 minutes after their allergy shot. No exceptions.  Xolair shots #1-3 should plan to wait 2 hours in clinic Xolair shots after #4 should plan 30 minute wait in clinic            Oct 24, 2017  4:30 PM CDT   Nurse Only with ALLERGY [facility] ([facility])    [address] [place] [address]   [phone]           Every allergy patient MUST wait 30 minutes after their allergy shot. No exceptions.  Xolair shots #1-3 should plan to wait 2 hours in clinic Xolair shots after #4 should plan 30 minute wait in clinic            Oct 27, 2017 11:15 AM CDT   Nurse Only with ALLERGY [facility] ([facility])    [address] [place] [address]   [phone]           Every allergy patient MUST wait 30 minutes after their allergy shot. No exceptions.  Xolair shots #1-3 should plan to wait 2 hours in clinic Xolair shots after #4 should plan 30 minute wait in clinic            Oct 31, 2017  4:45 PM CDT   Nurse Only with ALLERGY [facility] ([facility])    [address] [place] [address]   [phone]         "   Every allergy patient MUST wait 30 minutes after their allergy shot. No exceptions.  Xolair shots #1-3 should plan to wait 2 hours in clinic Xolair shots after #4 should plan 30 minute wait in clinic            Nov 03, 2017 11:15 AM CDT   Nurse Only with ALLERGY [facility] ([facility])    [address] [address]   [phone]           Every allergy patient MUST wait 30 minutes after their allergy shot. No exceptions.  Xolair shots #1-3 should plan to wait 2 hours in clinic Xolair shots after #4 should plan 30 minute wait in clinic              Who to contact     If you have questions or need follow up information about today's clinic visit or your schedule please contact [facility] directly at [phone].  Normal or non-critical lab and imaging results will be communicated to you by MyChart, letter or phone within 4 business days after the clinic has received the results. If you do not hear from us within 7 days, please contact the clinic through [employer] Rock[employer] or phone. If you have a critical or abnormal lab result, we will notify you by phone as soon as possible.  Submit refill requests through [employer] or call your pharmacy and they will forward the refill request to us. Please allow 3 business days for your refill to be completed.          Additional Information About Your Visit        MyChart Information     [employer] lets you send messages to your doctor, view your test results, renew your prescriptions, schedule appointments and more. To sign up, go to [email or web address] . Click on \"Log in\" on the left side of the screen, which will take you to the Welcome page. Then click on \"Sign up Now\" on the right side of the page.     You will be asked to enter the access code listed below, as well as some personal information. Please follow the directions to create your username and password.     Your access code is: " [email or web address]  Expires: 10/19/2017 11:52 AM     Your access code will  in 90 days. If you need help or a new code, please call your [place] clinic or [phone].        Care EveryWhere ID     This is your Care EveryWhere ID. This could be used by other organizations to access your [place] medical records  [id number]         Blood Pressure from Last 3 Encounters:   17 117/78   17 124/78   17 131/84    Weight from Last 3 Encounters:   17 169 lb 12.1 oz (77 kg)   17 168 lb 14.4 oz (76.6 kg)   17 170 lb 10.2 oz (77.4 kg)              We Performed the Following     Allergy Shot: Two or more injections        Primary Care Provider Office Phone # Fax #    [facility] [phone] [address] [facility] [address]        Equal Access to Services     EDUIN PENNINGTON : Hadii aad ku hadasho Soomaali, waaxda luqadaha, qaybta kaalmada adeegyada, waxay idiin hayaan juliaeg girmaaramarco a laskye . So [facility] [phone].    ATENCIÓN: Si habla español, tiene a spencer disposición servicios gratuitos de asistencia lingüística. Llame al [phone].    We comply with applicable federal civil rights laws and [place] laws. We do not discriminate on the basis of race, color, national origin, age, disability, sex, sexual orientation, or gender identity.            Thank you!     Thank you for choosing [facility]  for your care. Our goal is always to provide you with excellent care. Hearing back from our patients is one way we can continue to improve our services. Please take a few minutes to complete the written survey that you may receive in the mail after your visit with us. Thank you!             Your Updated Medication List - Protect others around you: Learn how to safely use, store and throw away your medicines at www.disposemymeds.org.          This list is accurate as of: 17 12:29 PM.  Always use your most recent med list.                    Brand Name Dispense Instructions for use Diagnosis    ADVIL 200 MG capsule   Generic drug:  ibuprofen      Take 200 mg by mouth every 4 hours as needed Reported on 4/28/2017        albuterol 108 (90 BASE) MCG/ACT Inhaler    PROAIR HFA/PROVENTIL HFA/VENTOLIN HFA     Inhale 2 puffs into the lungs every 4 hours as needed for shortness of breath / dyspnea or wheezing        * ALLERGEN IMMUNOTHERAPY PRESCRIPTION     5 mL    Name of Mix: Mix #1  Cat, Dog Cat Hair, Standardized 10,000 BAU/mL, ALK  2.0 ml Dog Hair Dander, A. P.  1:100 w/v, HS  1.0 ml  Diluent: HSA qs to 5ml    Seasonal allergic rhinitis due to pollen, Non-seasonal allergic rhinitis due to animal hair and dander, Chronic allergic conjunctivitis, Need for desensitization to allergens       * ALLERGEN IMMUNOTHERAPY PRESCRIPTION     5 mL    Name of Mix: Mix #2  Tree  Birch Mix GLY 1:20 w/v, HS  0.5 ml Boxelder-Maple  Mix BHR (Boxelder Hard Red) 1:20 w/v, HS  0.5 ml [place] Mix GLY 1:20 w/v, HS 0.5 ml Oak Mix RVW GLY 1:20 w/v, HS 0.5 ml [place] Tree, Black GLY 1:20 w/v, HS 0.5 ml [place], Black GLY 1:20 w/v, HS 0.5 ml Diluent: HSA qs to 5ml    Chronic seasonal allergic rhinitis due to pollen, Non-seasonal allergic rhinitis due to animal hair and dander, Chronic allergic conjunctivitis, Need for desensitization to allergens       EPINEPHrine 0.3 MG/0.3ML injection 2-pack    AUVI-Q    0.6 mL    Inject 0.3 mLs (0.3 mg) into the muscle as needed for anaphylaxis    Seasonal allergic rhinitis due to pollen, Non-seasonal allergic rhinitis due to animal hair and dander       fluticasone 50 MCG/ACT spray    FLONASE    1 Bottle    Spray 2 sprays into both nostrils daily    Atopic rhinitis       * Notice:  This list has 2 medication(s) that are the same as other medications prescribed for you. Read the directions carefully, and ask your doctor or other care provider to review them with you.

## 2017-09-29 NOTE — PROGRESS NOTES
SUBJECTIVE:                                                                   Dmitri Mcguire presents today to our Allergy Clinic at [facility] for a follow up visit.  As you know, he is a 27 year old male with allergic rhinoconjunctivitis and asthma.      Allergy Immunotherapy  Date/time of injection(s): 9/29/2017     Vial Color Content  Dose   Yellow 1:10 Cat, Dog  0.05 mL   Green 1:1,000 Trees  0.2 mL     He tolerates injections well without persistent large local reactions or systemic reactions.  These days he is doing well.  He can tolerate being in the room with pets for short period, but he can't touch them or spend time in the same room for a long period of time. In the past couldn't even be in a room with pets, because he would develop wheezing, and rhino-conjunctivitis within minutes.     He doesn't use intranasal steroids. Doesn't take antihistamines regularly.  The patient denies clear rhinorrhea, nasal itch, stuffiness, sneezing, itchy/watery eyes or interval sinusitis symptoms of fever, facial pain or purulent rhinorrhea.    Regarding his asthma, he has not been taking any controllers.  Dmitri  is using albuterol HFA  less than twice per week for rescue from chest symptoms. He is waking up less than twice per month due to chest symptoms.  There has been no use of oral steroids since last visit. The patient denies current chest tightness, cough, wheeze or SOB.   He denies interval ED/urgent care/PCP/other specialists visits for asthma flare.    Patient Active Problem List   Diagnosis     Intermittent asthma, uncomplicated     Seasonal allergic rhinitis due to pollen     Non-seasonal allergic rhinitis due to animal hair and dander     Chronic allergic conjunctivitis       Past Medical History:   Diagnosis Date     Allergic rhinitis due to other allergen      Mild intermittent asthma       Problem (# of Occurrences) Relation (Name,Age of Onset)    Cardiovascular (1) Maternal Grandfather:  stents    HEART DISEASE (1) Maternal Grandfather        History reviewed. No pertinent surgical history.  Social History     Social History     Marital status: Single     Spouse name: N/A     Number of children: N/A     Years of education: N/A     Social History Main Topics     Smoking status: Former Smoker     Years: 3.00     Types: Cigarettes, Dip, chew, snus or snuff     Smokeless tobacco: Former User     Types: Chew     Alcohol use Yes     Drug use: No     Sexual activity: Yes     Partners: Female     Birth control/ protection: Condom     Other Topics Concern     Parent/Sibling W/ Cabg, Mi Or Angioplasty Before 65f 55m? No     Social History Narrative    April 21, 2017    ENVIRONMENTAL HISTORY: [place] is heated with a gas furnace. They does have central air conditioning. The patient's bedroom is furnished with carpeting in bedroom and fabric window coverings.  Pets inside the house include 1 rabbit. There is not history of cockroach or mice infestation. There are no smokers in the house.  The house does not have a damp basement.            Review of Systems   Constitutional: Negative for fatigue and fever.   HENT: Negative for congestion, ear pain, facial swelling, nosebleeds, postnasal drip, rhinorrhea, sinus pressure and sneezing.    Eyes: Negative for discharge, redness and itching.   Respiratory: Negative for cough, chest tightness, shortness of breath, wheezing and stridor.    Gastrointestinal: Negative for diarrhea, nausea and vomiting.   Musculoskeletal: Negative for myalgias.   Skin: Negative for rash.   Neurological: Negative for headaches.   Hematological: Negative for adenopathy.   Psychiatric/Behavioral: Negative for behavioral problems and self-injury.           Current Outpatient Prescriptions:      ORDER FOR ALLERGEN IMMUNOTHERAPY, Name of Mix: Mix #2  Tree  Birch Mix GLY 1:20 w/v, HS  0.5 ml Boxelder-Maple  Mix BHR (Boxelder Hard Red) 1:20  w/v, HS  0.5 ml [place] Mix GLY 1:20 w/v, HS 0.5 ml Oak Mix RVW GLY 1:20 w/v, HS 0.5 ml [place] Tree, Black GLY 1:20 w/v, HS 0.5 ml [place], Black GLY 1:20 w/v, HS 0.5 ml Diluent: HSA qs to 5ml, Disp: 5 mL, Rfl: PRN     EPINEPHrine (AUVI-Q) 0.3 MG/0.3ML injection, Inject 0.3 mLs (0.3 mg) into the muscle as needed for anaphylaxis, Disp: 0.6 mL, Rfl: 1     ORDER FOR ALLERGEN IMMUNOTHERAPY, Name of Mix: Mix #1  Cat, Dog Cat Hair, Standardized 10,000 BAU/mL, ALK  2.0 ml Dog Hair Dander, A. P.  1:100 w/v, HS  1.0 ml  Diluent: HSA qs to 5ml, Disp: 5 mL, Rfl: PRN     ibuprofen (ADVIL) 200 MG capsule, Take 200 mg by mouth every 4 hours as needed Reported on 4/28/2017, Disp: , Rfl:      albuterol (PROAIR HFA/PROVENTIL HFA/VENTOLIN HFA) 108 (90 BASE) MCG/ACT Inhaler, Inhale 2 puffs into the lungs every 4 hours as needed for shortness of breath / dyspnea or wheezing, Disp: , Rfl:      fluticasone (FLONASE) 50 MCG/ACT spray, Spray 2 sprays into both nostrils daily (Patient not taking: Reported on 9/29/2017), Disp: 1 Bottle, Rfl: 11  Immunization History   Administered Date(s) Administered     DPT 1990, 1990, 1990, 10/05/1993, 05/14/1997     HEPA 08/25/2010     HIB 1990, 1990, 1992, 05/04/1993     HepB 08/18/2004, 09/24/2004, 08/03/2005     Influenza (IIV3) 11/27/2007, 12/01/2008, 10/05/2009, 09/17/2010     MMR 05/04/1993, 08/18/2004     Meningococcal (Menactra ) 08/25/2010     OPV, trivalent, live 1992, 1992, 10/05/1993     Poliovirus, inactivated (IPV) 05/14/1997     TDAP Vaccine (Adacel) 06/01/2017     Tetanus 08/18/2004     Allergies   Allergen Reactions     Dogs Hives     Amoxicillin Unknown     Ceclor [Cefaclor] Unknown     Erythrocin [Erythromycin] Diarrhea     Penicillins Unknown     OBJECTIVE:                                                                 /78 (BP Location: Left arm, Patient Position: Sitting, Cuff Size: Adult Regular)  Pulse 69  Temp 98.1  F (36.7  " C) (Tympanic)  Ht 5' 7.32\" (1.71 m)  Wt 169 lb 12.1 oz (77 kg)  SpO2 97%  BMI 26.33 kg/m2        Physical Exam   Constitutional: No distress.   HENT:   Head: Normocephalic and atraumatic.   Right Ear: Tympanic membrane, external ear and ear canal normal.   Left Ear: Tympanic membrane, external ear and ear canal normal.   Nose: No mucosal edema or rhinorrhea.   Eyes: Conjunctivae are normal. Right eye exhibits no discharge. Left eye exhibits no discharge.   Neck: Normal range of motion.   Cardiovascular: Normal rate, regular rhythm and normal heart sounds.    No murmur heard.  Pulmonary/Chest: Effort normal and breath sounds normal. No respiratory distress. He has no wheezes. He has no rales.   Musculoskeletal: Normal range of motion.   Neurological: He is alert.   Skin: Skin is warm. He is not diaphoretic.   Psychiatric: Affect normal.   Nursing note and vitals reviewed.    WORKUP:  SPIROMETRY       FVC 4.15 (82% of predicted).     FEV1 3.53 (83 % of predicted).     FEV1/FVC 85 (101 % of predicted).     FEF 25%-75%  3.74 (83% of predicted)    The office spirometry performed today doesn't suggest an obstruction.    Asthma Control Test (ACT) total score: 24     ASSESSMENT/PLAN:      Problem List Items Addressed This Visit        Respiratory    1. Intermittent asthma, uncomplicated  Currently well controlled with albuterol inhaler on as needed basis.  -Continue as is.    Relevant Medications    albuterol (PROAIR HFA/PROVENTIL HFA/VENTOLIN HFA) 108 (90 BASE) MCG/ACT Inhaler    2. Seasonal allergic rhinitis due to pollen - Primary  Currently well controlled with avoidance measures and allergen immunotherapy.  -Continue immunotherapy per protocol.  -Discussed with the patient taking cetirizine on the days of allergen immunotherapy injections, at least 30 minutes before the injections.     Relevant Medications    albuterol (PROAIR HFA/PROVENTIL HFA/VENTOLIN HFA) 108 (90 BASE) MCG/ACT Inhaler    3. Non-seasonal " allergic rhinitis due to animal hair and dander  Tolerance to cats and dogs improving with allergen immunotherapy.    Relevant Medications    albuterol (PROAIR HFA/PROVENTIL HFA/VENTOLIN HFA) 108 (90 BASE) MCG/ACT Inhaler       Infectious/Inflammatory    4. Chronic allergic conjunctivitis  Currently well controlled with allergen immunotherapy and avoidance measures.  -Continue as is.       Follow up in 6 months or sooner if needed.    Thank you for allowing us to participate in the care of this patient. Please feel free to contact us if there are any questions or concerns about the patient.    Disclaimer: This note consists of symbols derived from keyboarding, dictation and/or voice recognition software. As a result, there may be errors in the script that have gone undetected. Please consider this when interpreting information found in this chart.    Carlo Alegre MD, [facility]  Allergy, Asthma and Immunology  [place], [place] and [place]endo[place]

## 2017-09-29 NOTE — NURSING NOTE
"Chief Complaint   Patient presents with     Allergy Recheck     Allergy and Asthma follow up       Initial /78 (BP Location: Left arm, Patient Position: Sitting, Cuff Size: Adult Regular)  Pulse 69  Temp 98.1  F (36.7  C) (Tympanic)  Ht 5' 7.32\" (1.71 m)  Wt 169 lb 12.1 oz (77 kg)  SpO2 97%  BMI 26.33 kg/m2 Estimated body mass index is 26.33 kg/(m^2) as calculated from the following:    Height as of this encounter: 5' 7.32\" (1.71 m).    Weight as of this encounter: 169 lb 12.1 oz (77 kg).  Medication Reconciliation: complete    "

## 2017-09-29 NOTE — PROGRESS NOTES
Patient presented after waiting 30 minutes with no reaction to  injections. Discharged from clinic.    Yi Boone RN

## 2017-09-29 NOTE — MR AVS SNAPSHOT
After Visit Summary   9/29/2017    Dmitri Mcguire    MRN: [id number]           Patient Information     Date Of Birth          1990        Visit Information        Provider Department      9/29/2017 11:40 AM Carlo Alegre MD [facility]        Today's Diagnoses     Chronic seasonal allergic rhinitis due to pollen    -  1    Non-seasonal allergic rhinitis due to animal hair and dander        Chronic allergic conjunctivitis        Intermittent asthma, uncomplicated           Follow-ups after your visit        Follow-up notes from your care team     Return in about 6 months (around 3/29/2018), or if symptoms worsen or fail to improve, for rhinitis follow up, asthma follow up.      Your next 10 appointments already scheduled     Oct 03, 2017  5:15 PM CDT   Nurse Only with ALLERGY [facility] ([facility])    [address] [address]   [phone]           Every allergy patient MUST wait 30 minutes after their allergy shot. No exceptions.  Xolair shots #1-3 should plan to wait 2 hours in clinic Xolair shots after #4 should plan 30 minute wait in clinic            Oct 06, 2017 11:15 AM CDT   Nurse Only with ALLERGY [facility] ([facility])    [address] [address]   [phone]           Every allergy patient MUST wait 30 minutes after their allergy shot. No exceptions.  Xolair shots #1-3 should plan to wait 2 hours in clinic Xolair shots after #4 should plan 30 minute wait in clinic            Oct 10, 2017  4:45 PM CDT   Nurse Only with ALLERGY [facility] ([facility])    [address] [address]   [phone]           Every allergy patient MUST wait 30 minutes after their allergy shot. No exceptions.  Xolair shots #1-3 should plan to wait 2 hours in clinic Xolair shots after #4 should plan 30  minute wait in clinic            Oct 13, 2017 11:15 AM CDT   Nurse Only with ALLERGY [facility] ([facility])    [address] [place] [place] [address]   [phone]           Every allergy patient MUST wait 30 minutes after their allergy shot. No exceptions.  Xolair shots #1-3 should plan to wait 2 hours in clinic Xolair shots after #4 should plan 30 minute wait in clinic            Oct 17, 2017  5:15 PM CDT   Nurse Only with ALLERGY [facility] ([facility])    [address] [place] [address]   [phone]           Every allergy patient MUST wait 30 minutes after their allergy shot. No exceptions.  Xolair shots #1-3 should plan to wait 2 hours in clinic Xolair shots after #4 should plan 30 minute wait in clinic            Oct 20, 2017 11:15 AM CDT   Nurse Only with ALLERGY [facility] ([facility])    [address] [address]   [phone]           Every allergy patient MUST wait 30 minutes after their allergy shot. No exceptions.  Xolair shots #1-3 should plan to wait 2 hours in clinic Xolair shots after #4 should plan 30 minute wait in clinic            Oct 24, 2017  4:30 PM CDT   Nurse Only with ALLERGY [facility] ([facility])    [address] [place] [address]   [phone]           Every allergy patient MUST wait 30 minutes after their allergy shot. No exceptions.  Xolair shots #1-3 should plan to wait 2 hours in clinic Xolair shots after #4 should plan 30 minute wait in clinic            Oct 27, 2017 11:15 AM CDT   Nurse Only with ALLERGY [facility] ([facility])    [address] [place] [address]   [phone]           Every allergy patient MUST wait 30 minutes after their allergy shot. No exceptions.  Xolair shots #1-3  "should plan to wait 2 hours in clinic Xolair shots after #4 should plan 30 minute wait in clinic            Oct 31, 2017  4:45 PM CDT   Nurse Only with ALLERGY [facility] ([facility])    [address] [address]   [phone]           Every allergy patient MUST wait 30 minutes after their allergy shot. No exceptions.  Xolair shots #1-3 should plan to wait 2 hours in clinic Xolair shots after #4 should plan 30 minute wait in clinic            Nov 03, 2017 11:15 AM CDT   Nurse Only with ALLERGY [facility] ([facility])    [address] [address]   [phone]           Every allergy patient MUST wait 30 minutes after their allergy shot. No exceptions.  Xolair shots #1-3 should plan to wait 2 hours in clinic Xolair shots after #4 should plan 30 minute wait in clinic              Who to contact     If you have questions or need follow up information about today's clinic visit or your schedule please contact [facility] directly at [phone].  Normal or non-critical lab and imaging results will be communicated to you by [employer], letter or phone within 4 business days after the clinic has received the results. If you do not hear from us within 7 days, please contact the clinic through [employer] or phone. If you have a critical or abnormal lab result, we will notify you by phone as soon as possible.  Submit refill requests through [employer] or call your pharmacy and they will forward the refill request to us. Please allow 3 business days for your refill to be completed.          Additional Information About Your Visit        [employer] Information     [employer] lets you send messages to your doctor, view your test results, renew your prescriptions, schedule appointments and more. To sign up, go to [email or web address] . Click on \"Log in\" on the left side of the screen, which will " "take you to the Welcome page. Then click on \"Sign up Now\" on the right side of the page.     You will be asked to enter the access code listed below, as well as some personal information. Please follow the directions to create your username and password.     Your access code is: [email or web address]  Expires: 10/19/2017 11:52 AM     Your access code will  in 90 days. If you need help or a new code, please call your [facility] clinic or [phone].        Care EveryWhere ID     This is your Care EveryWhere ID. This could be used by other organizations to access your [facility] medical records          Your Vitals Were     Pulse Temperature Height Pulse Oximetry BMI (Body Mass Index)       69 98.1  F (36.7  C) (Tympanic) 5' 7.32\" (1.71 m) 97% 26.33 kg/m2        Blood Pressure from Last 3 Encounters:   17 117/78   17 124/78   17 131/84    Weight from Last 3 Encounters:   17 169 lb 12.1 oz (77 kg)   17 168 lb 14.4 oz (76.6 kg)   17 170 lb 10.2 oz (77.4 kg)              Today, you had the following     No orders found for display       Primary Care Provider Office Phone # Fax #    [facility] [phone] [address] [address]        Equal Access to Services     EDUIN PENNINGTON AH: Hadii aad ku hadasho Soomaali, waaxda luqadaha, qaybta kaalmada adeegyada, josiah rahman . So [facility] [phone].    ATENCIÓN: Si habla español, tiene a spencer disposición servicios gratuitos de asistencia lingüística. Llame al [phone].    We comply with applicable federal civil rights laws and [place] laws. We do not discriminate on the basis of race, color, national origin, age, disability, sex, sexual orientation, or gender identity.            Thank you!     Thank you for choosing [facility]  for your care. Our goal is always to provide you with excellent care. Hearing back from our patients is one way we " can continue to improve our services. Please take a few minutes to complete the written survey that you may receive in the mail after your visit with us. Thank you!             Your Updated Medication List - Protect others around you: Learn how to safely use, store and throw away your medicines at www.disposemymeds.org.          This list is accurate as of: 9/29/17 11:59 PM.  Always use your most recent med list.                   Brand Name Dispense Instructions for use Diagnosis    ADVIL 200 MG capsule   Generic drug:  ibuprofen      Take 200 mg by mouth every 4 hours as needed Reported on 4/28/2017        albuterol 108 (90 BASE) MCG/ACT Inhaler    PROAIR HFA/PROVENTIL HFA/VENTOLIN HFA     Inhale 2 puffs into the lungs every 4 hours as needed for shortness of breath / dyspnea or wheezing        * ALLERGEN IMMUNOTHERAPY PRESCRIPTION     5 mL    Name of Mix: Mix #1  Cat, Dog Cat Hair, Standardized 10,000 BAU/mL, ALK  2.0 ml Dog Hair Dander, A. P.  1:100 w/v, HS  1.0 ml  Diluent: HSA qs to 5ml    Seasonal allergic rhinitis due to pollen, Non-seasonal allergic rhinitis due to animal hair and dander, Chronic allergic conjunctivitis, Need for desensitization to allergens       * ALLERGEN IMMUNOTHERAPY PRESCRIPTION     5 mL    Name of Mix: Mix #2  Tree  Birch Mix GLY 1:20 w/v, HS  0.5 ml Boxelder-Maple  Mix BHR (Boxelder Hard Red) 1:20 w/v, HS  0.5 ml [place] Mix GLY 1:20 w/v, HS 0.5 ml Oak Mix RVW GLY 1:20 w/v, HS 0.5 ml [place] Tree, Black GLY 1:20 w/v, HS 0.5 ml [place], Black GLY 1:20 w/v, HS 0.5 ml Diluent: HSA qs to 5ml    Chronic seasonal allergic rhinitis due to pollen, Non-seasonal allergic rhinitis due to animal hair and dander, Chronic allergic conjunctivitis, Need for desensitization to allergens       EPINEPHrine 0.3 MG/0.3ML injection 2-pack    AUVI-Q    0.6 mL    Inject 0.3 mLs (0.3 mg) into the muscle as needed for anaphylaxis    Seasonal allergic rhinitis due to pollen, Non-seasonal allergic rhinitis due  to animal hair and dander       fluticasone 50 MCG/ACT spray    FLONASE    1 Bottle    Spray 2 sprays into both nostrils daily    Atopic rhinitis       * Notice:  This list has 2 medication(s) that are the same as other medications prescribed for you. Read the directions carefully, and ask your doctor or other care provider to review them with you.

## 2017-09-30 ASSESSMENT — ASTHMA QUESTIONNAIRES: ACT_TOTALSCORE: 24

## 2017-10-03 ENCOUNTER — ALLIED HEALTH/NURSE VISIT (OUTPATIENT)
Dept: ALLERGY | Facility: CLINIC | Age: 27
End: 2017-10-03
Payer: COMMERCIAL

## 2017-10-03 DIAGNOSIS — J30.1 ALLERGIC RHINITIS DUE TO POLLEN: Primary | ICD-10-CM

## 2017-10-03 PROCEDURE — 95117 IMMUNOTHERAPY INJECTIONS: CPT

## 2017-10-03 NOTE — MR AVS SNAPSHOT
After Visit Summary   10/3/2017    Dmitri Mcguire    MRN: [id number]           Patient Information     Date Of Birth          1990        Visit Information        Provider Department      10/3/2017 5:15 PM ALLERGY [facility]        Today's Diagnoses     Allergic rhinitis due to pollen    -  1       Follow-ups after your visit        Your next 10 appointments already scheduled     Oct 06, 2017 11:15 AM CDT   Nurse Only with ALLERGY [facility] ([facility])    [address] [facility] [address] [facility] [address]   [phone]           Every allergy patient MUST wait 30 minutes after their allergy shot. No exceptions.  Xolair shots #1-3 should plan to wait 2 hours in clinic Xolair shots after #4 should plan 30 minute wait in clinic            Oct 10, 2017  4:45 PM CDT   Nurse Only with ALLERGY [facility] ([facility])    [address] [facility] [address] [facility] [address]   [phone]           Every allergy patient MUST wait 30 minutes after their allergy shot. No exceptions.  Xolair shots #1-3 should plan to wait 2 hours in clinic Xolair shots after #4 should plan 30 minute wait in clinic            Oct 13, 2017 11:15 AM CDT   Nurse Only with ALLERGY [facility] ([facility])    [address] [facility] [address] [facility] [address]   [phone]           Every allergy patient MUST wait 30 minutes after their allergy shot. No exceptions.  Xolair shots #1-3 should plan to wait 2 hours in clinic Xolair shots after #4 should plan 30 minute wait in clinic            Oct 17, 2017  5:15 PM CDT   Nurse Only with ALLERGY [facility] ([facility])    [address] [facility] [address] [facility] [address]   [phone]           Every allergy patient MUST wait 30 minutes after their allergy shot. No exceptions.  Xolair shots #1-3 should plan  to wait 2 hours in clinic Xolair shots after #4 should plan 30 minute wait in clinic            Oct 20, 2017 11:15 AM CDT   Nurse Only with ALLERGY [facility] ([facility])    [address] [place] [address]   [phone]           Every allergy patient MUST wait 30 minutes after their allergy shot. No exceptions.  Xolair shots #1-3 should plan to wait 2 hours in clinic Xolair shots after #4 should plan 30 minute wait in clinic            Oct 24, 2017  4:30 PM CDT   Nurse Only with ALLERGY [facility] ([facility])    [address] [place] [address]   [phone]           Every allergy patient MUST wait 30 minutes after their allergy shot. No exceptions.  Xolair shots #1-3 should plan to wait 2 hours in clinic Xolair shots after #4 should plan 30 minute wait in clinic            Oct 27, 2017 11:15 AM CDT   Nurse Only with ALLERGY [facility] ([facility])    [address] [place] [address]   [phone]           Every allergy patient MUST wait 30 minutes after their allergy shot. No exceptions.  Xolair shots #1-3 should plan to wait 2 hours in clinic Xolair shots after #4 should plan 30 minute wait in clinic            Oct 31, 2017  4:45 PM CDT   Nurse Only with ALLERGY [facility] ([facility])    [address] [place] [address]   [phone]           Every allergy patient MUST wait 30 minutes after their allergy shot. No exceptions.  Xolair shots #1-3 should plan to wait 2 hours in clinic Xolair shots after #4 should plan 30 minute wait in clinic            Nov 03, 2017 11:15 AM CDT   Nurse Only with ALLERGY [facility] ([facility])    [address] [place] [address]   [phone]           Every allergy patient MUST wait 30  "minutes after their allergy shot. No exceptions.  Xolair shots #1-3 should plan to wait 2 hours in clinic Xolair shots after #4 should plan 30 minute wait in clinic            Nov 10, 2017 11:15 AM CST   Nurse Only with ALLERGY [facility] ([facility])    [address] [facility] [address]   [phone]           Every allergy patient MUST wait 30 minutes after their allergy shot. No exceptions.  Xolair shots #1-3 should plan to wait 2 hours in clinic Xolair shots after #4 should plan 30 minute wait in clinic              Who to contact     If you have questions or need follow up information about today's clinic visit or your schedule please contact [facility] directly at [phone].  Normal or non-critical lab and imaging results will be communicated to you by Power Assurehart, letter or phone within 4 business days after the clinic has received the results. If you do not hear from us within 7 days, please contact the clinic through Power Assurehart or phone. If you have a critical or abnormal lab result, we will notify you by phone as soon as possible.  Submit refill requests through Breather or call your pharmacy and they will forward the refill request to us. Please allow 3 business days for your refill to be completed.          Additional Information About Your Visit        Power AssureharQmerce Information     Breather lets you send messages to your doctor, view your test results, renew your prescriptions, schedule appointments and more. To sign up, go to [email or web address] . Click on \"Log in\" on the left side of the screen, which will take you to the Welcome page. Then click on \"Sign up Now\" on the right side of the page.     You will be asked to enter the access code listed below, as well as some personal information. Please follow the directions to create your username and password.     Your access code is: [email or web address]  Expires: 10/19/2017 11:52 AM     Your access " code will  in 90 days. If you need help or a new code, please call your [place] clinic or [phone].        Care EveryWhere ID     This is your Care EveryWhere ID. This could be used by other organizations to access your [place] medical records  [id number]         Blood Pressure from Last 3 Encounters:   17 117/78   17 124/78   17 131/84    Weight from Last 3 Encounters:   17 169 lb 12.1 oz (77 kg)   17 168 lb 14.4 oz (76.6 kg)   17 170 lb 10.2 oz (77.4 kg)              We Performed the Following     Allergy Shot: Two or more injections        Primary Care Provider Office Phone # Fax #    [facility] [phone] [address] [facility] [address]        Equal Access to Services     EDUIN PENNINGTON : Hadii aad ku hadasho Soomaali, waaxda luqadaha, qaybta kaalmada adeegyada, waxay idiin hayaan dennis rahman . So [facility] [phone].    ATENCIÓN: Si habla español, tiene a spencer disposición servicios gratuitos de asistencia lingüística. Llame al [phone].    We comply with applicable federal civil rights laws and [place] laws. We do not discriminate on the basis of race, color, national origin, age, disability, sex, sexual orientation, or gender identity.            Thank you!     Thank you for choosing [facility]  for your care. Our goal is always to provide you with excellent care. Hearing back from our patients is one way we can continue to improve our services. Please take a few minutes to complete the written survey that you may receive in the mail after your visit with us. Thank you!             Your Updated Medication List - Protect others around you: Learn how to safely use, store and throw away your medicines at www.disposemymeds.org.          This list is accurate as of: 10/3/17  5:25 PM.  Always use your most recent med list.                   Brand Name Dispense Instructions for use Diagnosis     ADVIL 200 MG capsule   Generic drug:  ibuprofen      Take 200 mg by mouth every 4 hours as needed Reported on 4/28/2017        albuterol 108 (90 BASE) MCG/ACT Inhaler    PROAIR HFA/PROVENTIL HFA/VENTOLIN HFA     Inhale 2 puffs into the lungs every 4 hours as needed for shortness of breath / dyspnea or wheezing        * ALLERGEN IMMUNOTHERAPY PRESCRIPTION     5 mL    Name of Mix: Mix #1  Cat, Dog Cat Hair, Standardized 10,000 BAU/mL, ALK  2.0 ml Dog Hair Dander, A. P.  1:100 w/v, HS  1.0 ml  Diluent: HSA qs to 5ml    Seasonal allergic rhinitis due to pollen, Non-seasonal allergic rhinitis due to animal hair and dander, Chronic allergic conjunctivitis, Need for desensitization to allergens       * ALLERGEN IMMUNOTHERAPY PRESCRIPTION     5 mL    Name of Mix: Mix #2  Tree  Birch Mix GLY 1:20 w/v, HS  0.5 ml Boxelder-Maple  Mix BHR (Boxelder Hard Red) 1:20 w/v, HS  0.5 ml [place] Mix GLY 1:20 w/v, HS 0.5 ml Oak Mix RVW GLY 1:20 w/v, HS 0.5 ml [place] Tree, Black GLY 1:20 w/v, HS 0.5 ml [place], Black GLY 1:20 w/v, HS 0.5 ml Diluent: HSA qs to 5ml    Chronic seasonal allergic rhinitis due to pollen, Non-seasonal allergic rhinitis due to animal hair and dander, Chronic allergic conjunctivitis, Need for desensitization to allergens       EPINEPHrine 0.3 MG/0.3ML injection 2-pack    AUVI-Q    0.6 mL    Inject 0.3 mLs (0.3 mg) into the muscle as needed for anaphylaxis    Seasonal allergic rhinitis due to pollen, Non-seasonal allergic rhinitis due to animal hair and dander       fluticasone 50 MCG/ACT spray    FLONASE    1 Bottle    Spray 2 sprays into both nostrils daily    Atopic rhinitis       * Notice:  This list has 2 medication(s) that are the same as other medications prescribed for you. Read the directions carefully, and ask your doctor or other care provider to review them with you.

## 2017-10-10 ENCOUNTER — ALLIED HEALTH/NURSE VISIT (OUTPATIENT)
Dept: ALLERGY | Facility: CLINIC | Age: 27
End: 2017-10-10
Payer: COMMERCIAL

## 2017-10-10 DIAGNOSIS — J30.1 CHRONIC SEASONAL ALLERGIC RHINITIS DUE TO POLLEN: Primary | ICD-10-CM

## 2017-10-10 PROCEDURE — 99207 ZZC NO CHARGE LOS: CPT

## 2017-10-10 PROCEDURE — 95115 IMMUNOTHERAPY ONE INJECTION: CPT

## 2017-10-10 NOTE — MR AVS SNAPSHOT
After Visit Summary   10/10/2017    Dmitri Mcguire    MRN: [id number]           Patient Information     Date Of Birth          1990        Visit Information        Provider Department      10/10/2017 4:45 PM ALLERGY [facility]        Today's Diagnoses     Chronic seasonal allergic rhinitis due to pollen    -  1       Follow-ups after your visit        Your next 10 appointments already scheduled     Oct 13, 2017 11:15 AM CDT   Nurse Only with ALLERGY [facility] ([facility])    [address] [facility] [address]   [phone]           Every allergy patient MUST wait 30 minutes after their allergy shot. No exceptions.  Xolair shots #1-3 should plan to wait 2 hours in clinic Xolair shots after #4 should plan 30 minute wait in clinic            Oct 17, 2017  5:15 PM CDT   Nurse Only with ALLERGY [facility] ([facility])    [address] [facility] [address]   [phone]           Every allergy patient MUST wait 30 minutes after their allergy shot. No exceptions.  Xolair shots #1-3 should plan to wait 2 hours in clinic Xolair shots after #4 should plan 30 minute wait in clinic            Oct 20, 2017 11:15 AM CDT   Nurse Only with ALLERGY [facility] ([facility])    [address] [facility] [address]   [phone]           Every allergy patient MUST wait 30 minutes after their allergy shot. No exceptions.  Xolair shots #1-3 should plan to wait 2 hours in clinic Xolair shots after #4 should plan 30 minute wait in clinic            Oct 24, 2017  4:30 PM CDT   Nurse Only with ALLERGY [facility] ([facility])    [address] [facility] [place] [address]   [phone]           Every allergy patient MUST wait 30 minutes after their allergy shot. No exceptions.  Xolair  shots #1-3 should plan to wait 2 hours in clinic Xolair shots after #4 should plan 30 minute wait in clinic            Oct 27, 2017 11:15 AM CDT   Nurse Only with ALLERGY [facility] ([facility])    [address] [facility] [place] [address]   [phone]           Every allergy patient MUST wait 30 minutes after their allergy shot. No exceptions.  Xolair shots #1-3 should plan to wait 2 hours in clinic Xolair shots after #4 should plan 30 minute wait in clinic            Oct 31, 2017  4:45 PM CDT   Nurse Only with ALLERGY [facility] ([facility])    [address] [facility] [place] [address]   [phone]           Every allergy patient MUST wait 30 minutes after their allergy shot. No exceptions.  Xolair shots #1-3 should plan to wait 2 hours in clinic Xolair shots after #4 should plan 30 minute wait in clinic            Nov 03, 2017 11:15 AM CDT   Nurse Only with ALLERGY [facility] ([facility])    [address] [facility] [place] [address]   [phone]           Every allergy patient MUST wait 30 minutes after their allergy shot. No exceptions.  Xolair shots #1-3 should plan to wait 2 hours in clinic Xolair shots after #4 should plan 30 minute wait in clinic            Nov 10, 2017 11:15 AM CST   Nurse Only with ALLERGY [facility] ([facility])    [address] [facility] [place] [address]   [phone]           Every allergy patient MUST wait 30 minutes after their allergy shot. No exceptions.  Xolair shots #1-3 should plan to wait 2 hours in clinic Xolair shots after #4 should plan 30 minute wait in clinic            Nov 17, 2017 11:15 AM CST   Nurse Only with ALLERGY [facility] ([facility])    [address] [facility] [place] [address]   [phone]           Every allergy  "patient MUST wait 30 minutes after their allergy shot. No exceptions.  Xolair shots #1-3 should plan to wait 2 hours in clinic Xolair shots after #4 should plan 30 minute wait in clinic            Nov 24, 2017 11:15 AM CST   Nurse Only with ALLERGY [facility] ([facility])    [address] [facility] [address]   [phone]           Every allergy patient MUST wait 30 minutes after their allergy shot. No exceptions.  Xolair shots #1-3 should plan to wait 2 hours in clinic Xolair shots after #4 should plan 30 minute wait in clinic              Who to contact     If you have questions or need follow up information about today's clinic visit or your schedule please contact [facility] directly at [phone].  Normal or non-critical lab and imaging results will be communicated to you by MyChart, letter or phone within 4 business days after the clinic has received the results. If you do not hear from us within 7 days, please contact the clinic through Skyhoodhart or phone. If you have a critical or abnormal lab result, we will notify you by phone as soon as possible.  Submit refill requests through [email or web address] or call your pharmacy and they will forward the refill request to us. Please allow 3 business days for your refill to be completed.          Additional Information About Your Visit        MyChart Information     [email or web address] lets you send messages to your doctor, view your test results, renew your prescriptions, schedule appointments and more. To sign up, go to [email or web address] . Click on \"Log in\" on the left side of the screen, which will take you to the Welcome page. Then click on \"Sign up Now\" on the right side of the page.     You will be asked to enter the access code listed below, as well as some personal information. Please follow the directions to create your username and password.     Your access code is: [email or web address]  Expires: 10/19/2017 " 11:52 AM     Your access code will  in 90 days. If you need help or a new code, please call your [place] clinic or [phone].        Care EveryWhere ID     This is your Care EveryWhere ID. This could be used by other organizations to access your [place] medical records  [id number]         Blood Pressure from Last 3 Encounters:   17 117/78   17 124/78   17 131/84    Weight from Last 3 Encounters:   17 169 lb 12.1 oz (77 kg)   17 168 lb 14.4 oz (76.6 kg)   17 170 lb 10.2 oz (77.4 kg)              We Performed the Following     Allergy Shot: One injection        Primary Care Provider Office Phone # Fax #    [facility] [phone] [address]  [facility] [address]        Equal Access to Services     EDUIN PENNINGTON : Hadii aad ku hadasho Soomaali, waaxda luqadaha, qaybta kaalmada adeegyada, waxay idiin hayaan dennis rahman . So [facility] [phone].    ATENCIÓN: Si habla español, tiene a spencer disposición servicios gratuitos de asistencia lingüística. Mushtaq al [phone].    We comply with applicable federal civil rights laws and [place] laws. We do not discriminate on the basis of race, color, national origin, age, disability, sex, sexual orientation, or gender identity.            Thank you!     Thank you for choosing [facility]  for your care. Our goal is always to provide you with excellent care. Hearing back from our patients is one way we can continue to improve our services. Please take a few minutes to complete the written survey that you may receive in the mail after your visit with us. Thank you!             Your Updated Medication List - Protect others around you: Learn how to safely use, store and throw away your medicines at www.disposemymeds.org.          This list is accurate as of: 10/10/17  5:19 PM.  Always use your most recent med list.                   Brand Name Dispense Instructions for  use Diagnosis    ADVIL 200 MG capsule   Generic drug:  ibuprofen      Take 200 mg by mouth every 4 hours as needed Reported on 4/28/2017        albuterol 108 (90 BASE) MCG/ACT Inhaler    PROAIR HFA/PROVENTIL HFA/VENTOLIN HFA     Inhale 2 puffs into the lungs every 4 hours as needed for shortness of breath / dyspnea or wheezing        * ALLERGEN IMMUNOTHERAPY PRESCRIPTION     5 mL    Name of Mix: Mix #1  Cat, Dog Cat Hair, Standardized 10,000 BAU/mL, ALK  2.0 ml Dog Hair Dander, A. P.  1:100 w/v, HS  1.0 ml  Diluent: HSA qs to 5ml    Seasonal allergic rhinitis due to pollen, Non-seasonal allergic rhinitis due to animal hair and dander, Chronic allergic conjunctivitis, Need for desensitization to allergens       * ALLERGEN IMMUNOTHERAPY PRESCRIPTION     5 mL    Name of Mix: Mix #2  Tree  Birch Mix GLY 1:20 w/v, HS  0.5 ml Boxelder-Maple  Mix BHR (Boxelder Hard Red) 1:20 w/v, HS  0.5 ml [place] Mix GLY 1:20 w/v, HS 0.5 ml Oak Mix RVW GLY 1:20 w/v, HS 0.5 ml [place] Tree, Black GLY 1:20 w/v, HS 0.5 ml [place], Black GLY 1:20 w/v, HS 0.5 ml Diluent: HSA qs to 5ml    Chronic seasonal allergic rhinitis due to pollen, Non-seasonal allergic rhinitis due to animal hair and dander, Chronic allergic conjunctivitis, Need for desensitization to allergens       EPINEPHrine 0.3 MG/0.3ML injection 2-pack    AUVI-Q    0.6 mL    Inject 0.3 mLs (0.3 mg) into the muscle as needed for anaphylaxis    Seasonal allergic rhinitis due to pollen, Non-seasonal allergic rhinitis due to animal hair and dander       fluticasone 50 MCG/ACT spray    FLONASE    1 Bottle    Spray 2 sprays into both nostrils daily    Atopic rhinitis       * Notice:  This list has 2 medication(s) that are the same as other medications prescribed for you. Read the directions carefully, and ask your doctor or other care provider to review them with you.

## 2017-10-13 ENCOUNTER — ALLIED HEALTH/NURSE VISIT (OUTPATIENT)
Dept: ALLERGY | Facility: CLINIC | Age: 27
End: 2017-10-13
Payer: COMMERCIAL

## 2017-10-13 DIAGNOSIS — J30.2 SEASONAL ALLERGIC RHINITIS: Primary | ICD-10-CM

## 2017-10-13 PROCEDURE — 99207 ZZC NO CHARGE LOS: CPT

## 2017-10-13 PROCEDURE — 95117 IMMUNOTHERAPY INJECTIONS: CPT

## 2017-10-13 NOTE — MR AVS SNAPSHOT
After Visit Summary   10/13/2017    Dmitri Mcguire    MRN: [id number]           Patient Information     Date Of Birth          1990        Visit Information        Provider Department      10/13/2017 11:15 AM ALLERGY [facility]        Today's Diagnoses     Seasonal allergic rhinitis    -  1       Follow-ups after your visit        Your next 10 appointments already scheduled     Oct 17, 2017  5:15 PM CDT   Nurse Only with ALLERGY [facility] ([facility])    [address] [address]   [phone]           Every allergy patient MUST wait 30 minutes after their allergy shot. No exceptions.  Xolair shots #1-3 should plan to wait 2 hours in clinic Xolair shots after #4 should plan 30 minute wait in clinic            Oct 20, 2017 11:15 AM CDT   Nurse Only with ALLERGY [facility] ([facility])    [address] [address]   [phone]           Every allergy patient MUST wait 30 minutes after their allergy shot. No exceptions.  Xolair shots #1-3 should plan to wait 2 hours in clinic Xolair shots after #4 should plan 30 minute wait in clinic            Oct 24, 2017  4:30 PM CDT   Nurse Only with ALLERGY [facility] ([facility])    [address] [address]   [phone]           Every allergy patient MUST wait 30 minutes after their allergy shot. No exceptions.  Xolair shots #1-3 should plan to wait 2 hours in clinic Xolair shots after #4 should plan 30 minute wait in clinic            Oct 27, 2017 11:15 AM CDT   Nurse Only with ALLERGY [facility] ([facility])    [address] [facility] [place] [address]   [phone]           Every allergy patient MUST wait 30 minutes after their allergy shot. No exceptions.  Xolair shots #1-3 should plan  to wait 2 hours in clinic Xolair shots after #4 should plan 30 minute wait in clinic            Oct 31, 2017  4:45 PM CDT   Nurse Only with ALLERGY [facility] ([facility])    [address] [facility] [place] [address]   [phone]           Every allergy patient MUST wait 30 minutes after their allergy shot. No exceptions.  Xolair shots #1-3 should plan to wait 2 hours in clinic Xolair shots after #4 should plan 30 minute wait in clinic            Nov 03, 2017 11:15 AM CDT   Nurse Only with ALLERGY [facility] ([facility])    [address] [facility] [place] [address]   [phone]           Every allergy patient MUST wait 30 minutes after their allergy shot. No exceptions.  Xolair shots #1-3 should plan to wait 2 hours in clinic Xolair shots after #4 should plan 30 minute wait in clinic            Nov 10, 2017 11:15 AM CST   Nurse Only with ALLERGY [facility] ([facility])    [address] [facility] [place] [address]   [phone]           Every allergy patient MUST wait 30 minutes after their allergy shot. No exceptions.  Xolair shots #1-3 should plan to wait 2 hours in clinic Xolair shots after #4 should plan 30 minute wait in clinic            Nov 17, 2017 11:15 AM CST   Nurse Only with ALLERGY [facility] ([facility])    [address] [facility] [address] [facility] [place] [address]   [phone]           Every allergy patient MUST wait 30 minutes after their allergy shot. No exceptions.  Xolair shots #1-3 should plan to wait 2 hours in clinic Xolair shots after #4 should plan 30 minute wait in clinic            Nov 24, 2017 11:15 AM CST   Nurse Only with ALLERGY [facility] ([facility])    [address] [facility] [place] [address]   [phone]           Every allergy patient MUST wait 30  "minutes after their allergy shot. No exceptions.  Xolair shots #1-3 should plan to wait 2 hours in clinic Xolair shots after #4 should plan 30 minute wait in clinic            Dec 01, 2017 11:15 AM CST   Nurse Only with ALLERGY [facility] ([facility])    [address] [facility] [address]   [phone]           Every allergy patient MUST wait 30 minutes after their allergy shot. No exceptions.  Xolair shots #1-3 should plan to wait 2 hours in clinic Xolair shots after #4 should plan 30 minute wait in clinic              Who to contact     If you have questions or need follow up information about today's clinic visit or your schedule please contact [facility] directly at [phone].  Normal or non-critical lab and imaging results will be communicated to you by KrowdPadhart, letter or phone within 4 business days after the clinic has received the results. If you do not hear from us within 7 days, please contact the clinic through KrowdPadhart or phone. If you have a critical or abnormal lab result, we will notify you by phone as soon as possible.  Submit refill requests through Cookapp or call your pharmacy and they will forward the refill request to us. Please allow 3 business days for your refill to be completed.          Additional Information About Your Visit        KrowdPadharMD Lingo Information     Cookapp lets you send messages to your doctor, view your test results, renew your prescriptions, schedule appointments and more. To sign up, go to [email or web address] . Click on \"Log in\" on the left side of the screen, which will take you to the Welcome page. Then click on \"Sign up Now\" on the right side of the page.     You will be asked to enter the access code listed below, as well as some personal information. Please follow the directions to create your username and password.     Your access code is: [email or web address]  Expires: 10/19/2017 11:52 AM     Your access " code will  in 90 days. If you need help or a new code, please call your [place] clinic or [phone].        Care EveryWhere ID     This is your Care EveryWhere ID. This could be used by other organizations to access your [place] medical records  [id number]         Blood Pressure from Last 3 Encounters:   17 117/78   17 124/78   17 131/84    Weight from Last 3 Encounters:   17 169 lb 12.1 oz (77 kg)   17 168 lb 14.4 oz (76.6 kg)   17 170 lb 10.2 oz (77.4 kg)              We Performed the Following     Allergy Shot: Two or more injections        Primary Care Provider Office Phone # Fax #    [facility] [phone] [address] [facility] [address]        Equal Access to Services     EDUIN PENNINGTON : Hadii aad ku hadasho Soomaali, waaxda luqadaha, qaybta kaalmada adeegyada, waxay idiin hayaan dennis rahman . So [facility] [phone].    ATENCIÓN: Si habla español, tiene a spencer disposición servicios gratuitos de asistencia lingüística. Llame al [phone].    We comply with applicable federal civil rights laws and [place] laws. We do not discriminate on the basis of race, color, national origin, age, disability, sex, sexual orientation, or gender identity.            Thank you!     Thank you for choosing [facility]  for your care. Our goal is always to provide you with excellent care. Hearing back from our patients is one way we can continue to improve our services. Please take a few minutes to complete the written survey that you may receive in the mail after your visit with us. Thank you!             Your Updated Medication List - Protect others around you: Learn how to safely use, store and throw away your medicines at www.disposemymeds.org.          This list is accurate as of: 10/13/17 11:54 AM.  Always use your most recent med list.                   Brand Name Dispense Instructions for use Diagnosis     ADVIL 200 MG capsule   Generic drug:  ibuprofen      Take 200 mg by mouth every 4 hours as needed Reported on 4/28/2017        albuterol 108 (90 BASE) MCG/ACT Inhaler    PROAIR HFA/PROVENTIL HFA/VENTOLIN HFA     Inhale 2 puffs into the lungs every 4 hours as needed for shortness of breath / dyspnea or wheezing        * ALLERGEN IMMUNOTHERAPY PRESCRIPTION     5 mL    Name of Mix: Mix #1  Cat, Dog Cat Hair, Standardized 10,000 BAU/mL, ALK  2.0 ml Dog Hair Dander, A. P.  1:100 w/v, HS  1.0 ml  Diluent: HSA qs to 5ml    Seasonal allergic rhinitis due to pollen, Non-seasonal allergic rhinitis due to animal hair and dander, Chronic allergic conjunctivitis, Need for desensitization to allergens       * ALLERGEN IMMUNOTHERAPY PRESCRIPTION     5 mL    Name of Mix: Mix #2  Tree  Birch Mix GLY 1:20 w/v, HS  0.5 ml Boxelder-Maple  Mix BHR (Boxelder Hard Red) 1:20 w/v, HS  0.5 ml [place] Mix GLY 1:20 w/v, HS 0.5 ml Oak Mix RVW GLY 1:20 w/v, HS 0.5 ml [place] Tree, Black GLY 1:20 w/v, HS 0.5 ml [place], Black GLY 1:20 w/v, HS 0.5 ml Diluent: HSA qs to 5ml    Chronic seasonal allergic rhinitis due to pollen, Non-seasonal allergic rhinitis due to animal hair and dander, Chronic allergic conjunctivitis, Need for desensitization to allergens       EPINEPHrine 0.3 MG/0.3ML injection 2-pack    AUVI-Q    0.6 mL    Inject 0.3 mLs (0.3 mg) into the muscle as needed for anaphylaxis    Seasonal allergic rhinitis due to pollen, Non-seasonal allergic rhinitis due to animal hair and dander       fluticasone 50 MCG/ACT spray    FLONASE    1 Bottle    Spray 2 sprays into both nostrils daily    Atopic rhinitis       * Notice:  This list has 2 medication(s) that are the same as other medications prescribed for you. Read the directions carefully, and ask your doctor or other care provider to review them with you.

## 2017-10-20 ENCOUNTER — ALLIED HEALTH/NURSE VISIT (OUTPATIENT)
Dept: ALLERGY | Facility: CLINIC | Age: 27
End: 2017-10-20
Payer: COMMERCIAL

## 2017-10-20 DIAGNOSIS — J30.2 CHRONIC SEASONAL ALLERGIC RHINITIS, UNSPECIFIED TRIGGER: Primary | ICD-10-CM

## 2017-10-20 PROCEDURE — 99207 ZZC NO CHARGE LOS: CPT

## 2017-10-20 PROCEDURE — 95117 IMMUNOTHERAPY INJECTIONS: CPT

## 2017-10-20 NOTE — MR AVS SNAPSHOT
After Visit Summary   10/20/2017    Dmitri Mcguire    MRN: [id number]           Patient Information     Date Of Birth          1990        Visit Information        Provider Department      10/20/2017 11:15 AM ALLERGY [facility]        Today's Diagnoses     Chronic seasonal allergic rhinitis, unspecified trigger    -  1       Follow-ups after your visit        Your next 10 appointments already scheduled     Oct 24, 2017  4:30 PM CDT   Nurse Only with ALLERGY [facility] ([facility])    [address] [address]   [phone]           Every allergy patient MUST wait 30 minutes after their allergy shot. No exceptions.  Xolair shots #1-3 should plan to wait 2 hours in clinic Xolair shots after #4 should plan 30 minute wait in clinic            Oct 27, 2017 11:15 AM CDT   Nurse Only with ALLERGY [facility] ([facility])    [address] [address]   [phone]           Every allergy patient MUST wait 30 minutes after their allergy shot. No exceptions.  Xolair shots #1-3 should plan to wait 2 hours in clinic Xolair shots after #4 should plan 30 minute wait in clinic            Oct 31, 2017  4:45 PM CDT   Nurse Only with ALLERGY [facility] ([facility])    [address] [facility] [facility] [place] [address]   [phone]           Every allergy patient MUST wait 30 minutes after their allergy shot. No exceptions.  Xolair shots #1-3 should plan to wait 2 hours in clinic Xolair shots after #4 should plan 30 minute wait in clinic            Nov 03, 2017 11:15 AM CDT   Nurse Only with ALLERGY [facility] ([facility])    [address] [facility] [place] [address]   [phone]           Every allergy patient MUST wait 30 minutes after their allergy shot. No exceptions.   Xolair shots #1-3 should plan to wait 2 hours in clinic Xolair shots after #4 should plan 30 minute wait in clinic            Nov 10, 2017 11:15 AM CST   Nurse Only with [facility]ER[facility])    [address] [facility] [place] [address]   [phone]           Every allergy patient MUST wait 30 minutes after their allergy shot. No exceptions.  Xolair shots #1-3 should plan to wait 2 hours in clinic Xolair shots after #4 should plan 30 minute wait in clinic            Nov 17, 2017 11:15 AM CST   Nurse Only with [facility]ER[facility] [place] [place])    [address] [facility] [place] [address]   [phone]           Every allergy patient MUST wait 30 minutes after their allergy shot. No exceptions.  Xolair shots #1-3 should plan to wait 2 hours in clinic Xolair shots after #4 should plan 30 minute wait in clinic            Nov 24, 2017 11:15 AM CST   Nurse Only with [facility]ERGY [facility] [place])    [address] [facility] [place] [address]   [phone]           Every allergy patient MUST wait 30 minutes after their allergy shot. No exceptions.  Xolair shots #1-3 should plan to wait 2 hours in clinic Xolair shots after #4 should plan 30 minute wait in clinic            Dec 01, 2017 11:15 AM CST   Nurse Only with ALLERGY [facility] [place] [facility])    [address] [facility] [place] [address]   [phone]           Every allergy patient MUST wait 30 minutes after their allergy shot. No exceptions.  Xolair shots #1-3 should plan to wait 2 hours in clinic Xolair shots after #4 should plan 30 minute wait in clinic            Dec 08, 2017 11:15 AM CST   Nurse Only with ALLERGY [facility] [place])    [address] [facility] [place] [address]   [phone]           Every  "allergy patient MUST wait 30 minutes after their allergy shot. No exceptions.  Xolair shots #1-3 should plan to wait 2 hours in clinic Xolair shots after #4 should plan 30 minute wait in clinic            Dec 15, 2017 11:15 AM CST   Nurse Only with ALLERGY [facility] ([facility])    [address] [facility] [address]   [phone]           Every allergy patient MUST wait 30 minutes after their allergy shot. No exceptions.  Xolair shots #1-3 should plan to wait 2 hours in clinic Xolair shots after #4 should plan 30 minute wait in clinic              Who to contact     If you have questions or need follow up information about today's clinic visit or your schedule please contact [facility] directly at [phone].  Normal or non-critical lab and imaging results will be communicated to you by MyChart, letter or phone within 4 business days after the clinic has received the results. If you do not hear from us within 7 days, please contact the clinic through Simfinithart or phone. If you have a critical or abnormal lab result, we will notify you by phone as soon as possible.  Submit refill requests through Kindling or call your pharmacy and they will forward the refill request to us. Please allow 3 business days for your refill to be completed.          Additional Information About Your Visit        MyChart Information     Kindling lets you send messages to your doctor, view your test results, renew your prescriptions, schedule appointments and more. To sign up, go to [email or web address] . Click on \"Log in\" on the left side of the screen, which will take you to the Welcome page. Then click on \"Sign up Now\" on the right side of the page.     You will be asked to enter the access code listed below, as well as some personal information. Please follow the directions to create your username and password.     Your access code is: [email or web address]  Expires: " 2018 12:00 PM     Your access code will  in 90 days. If you need help or a new code, please call your [place] clinic or [phone].        Care EveryWhere ID     This is your Care EveryWhere ID. This could be used by other organizations to access your [place] medical records  [id number]         Blood Pressure from Last 3 Encounters:   17 117/78   17 124/78   17 131/84    Weight from Last 3 Encounters:   17 169 lb 12.1 oz (77 kg)   17 168 lb 14.4 oz (76.6 kg)   17 170 lb 10.2 oz (77.4 kg)              We Performed the Following     Allergy Shot: Two or more injections        Primary Care Provider Office Phone # Fax #    [facility] [phone] [address] [facility] [address]        Equal Access to Services     EDUIN PENNINGTON : Hadii aad ku hadasho Soomaali, waaxda luqadaha, qaybta kaalmada adeegyada, waxay idiin hayaan dennis khmed rahman . So [facility] [phone].    ATENCIÓN: Si rolando jasso, tiene a spencer disposición servicios gratuitos de asistencia lingüística. Llame al [phone].    We comply with applicable federal civil rights laws and [place] laws. We do not discriminate on the basis of race, color, national origin, age, disability, sex, sexual orientation, or gender identity.            Thank you!     Thank you for choosing [facility]  for your care. Our goal is always to provide you with excellent care. Hearing back from our patients is one way we can continue to improve our services. Please take a few minutes to complete the written survey that you may receive in the mail after your visit with us. Thank you!             Your Updated Medication List - Protect others around you: Learn how to safely use, store and throw away your medicines at www.disposemymeds.org.          This list is accurate as of: 10/20/17 12:00 PM.  Always use your most recent med list.                   Brand Name Dispense  Instructions for use Diagnosis    ADVIL 200 MG capsule   Generic drug:  ibuprofen      Take 200 mg by mouth every 4 hours as needed Reported on 4/28/2017        albuterol 108 (90 BASE) MCG/ACT Inhaler    PROAIR HFA/PROVENTIL HFA/VENTOLIN HFA     Inhale 2 puffs into the lungs every 4 hours as needed for shortness of breath / dyspnea or wheezing        * ALLERGEN IMMUNOTHERAPY PRESCRIPTION     5 mL    Name of Mix: Mix #1  Cat, Dog Cat Hair, Standardized 10,000 BAU/mL, ALK  2.0 ml Dog Hair Dander, A. P.  1:100 w/v, HS  1.0 ml  Diluent: HSA qs to 5ml    Seasonal allergic rhinitis due to pollen, Non-seasonal allergic rhinitis due to animal hair and dander, Chronic allergic conjunctivitis, Need for desensitization to allergens       * ALLERGEN IMMUNOTHERAPY PRESCRIPTION     5 mL    Name of Mix: Mix #2  Tree  Birch Mix GLY 1:20 w/v, HS  0.5 ml Boxelder-Maple  Mix BHR (Boxelder Hard Red) 1:20 w/v, HS  0.5 ml [place] Mix GLY 1:20 w/v, HS 0.5 ml Oak Mix RVW GLY 1:20 w/v, HS 0.5 ml [place] Tree, Black GLY 1:20 w/v, HS 0.5 ml [place], Black GLY 1:20 w/v, HS 0.5 ml Diluent: HSA qs to 5ml    Chronic seasonal allergic rhinitis due to pollen, Non-seasonal allergic rhinitis due to animal hair and dander, Chronic allergic conjunctivitis, Need for desensitization to allergens       EPINEPHrine 0.3 MG/0.3ML injection 2-pack    AUVI-Q    0.6 mL    Inject 0.3 mLs (0.3 mg) into the muscle as needed for anaphylaxis    Seasonal allergic rhinitis due to pollen, Non-seasonal allergic rhinitis due to animal hair and dander       fluticasone 50 MCG/ACT spray    FLONASE    1 Bottle    Spray 2 sprays into both nostrils daily    Atopic rhinitis       * Notice:  This list has 2 medication(s) that are the same as other medications prescribed for you. Read the directions carefully, and ask your doctor or other care provider to review them with you.

## 2017-10-27 ENCOUNTER — ALLIED HEALTH/NURSE VISIT (OUTPATIENT)
Dept: ALLERGY | Facility: CLINIC | Age: 27
End: 2017-10-27
Payer: COMMERCIAL

## 2017-10-27 DIAGNOSIS — J30.1 CHRONIC SEASONAL ALLERGIC RHINITIS DUE TO POLLEN: Primary | ICD-10-CM

## 2017-10-27 PROCEDURE — 99207 ZZC NO CHARGE LOS: CPT

## 2017-10-27 PROCEDURE — 95117 IMMUNOTHERAPY INJECTIONS: CPT

## 2017-10-27 NOTE — MR AVS SNAPSHOT
After Visit Summary   10/27/2017    Dmitri Mcguire    MRN: [id number]           Patient Information     Date Of Birth          1990        Visit Information        Provider Department      10/27/2017 11:15 AM ALLERGY [facility]        Today's Diagnoses     Chronic seasonal allergic rhinitis due to pollen    -  1       Follow-ups after your visit        Your next 10 appointments already scheduled     Nov 03, 2017 11:15 AM CDT   Nurse Only with ALLERGY [facility] ([facility])    [address] [place] [address]   [phone]           Every allergy patient MUST wait 30 minutes after their allergy shot. No exceptions.  Xolair shots #1-3 should plan to wait 2 hours in clinic Xolair shots after #4 should plan 30 minute wait in clinic            Nov 10, 2017 11:15 AM CST   Nurse Only with ALLERGY [facility] ([facility])    [address] [place] [address]   [phone]           Every allergy patient MUST wait 30 minutes after their allergy shot. No exceptions.  Xolair shots #1-3 should plan to wait 2 hours in clinic Xolair shots after #4 should plan 30 minute wait in clinic            Nov 17, 2017 11:15 AM CST   Nurse Only with ALLERGY [facility] ([facility])    [address] [place] [address]   [phone]           Every allergy patient MUST wait 30 minutes after their allergy shot. No exceptions.  Xolair shots #1-3 should plan to wait 2 hours in clinic Xolair shots after #4 should plan 30 minute wait in clinic            Nov 24, 2017 11:15 AM CST   Nurse Only with ALLERGY [facility] ([facility])    [address] [place] [address]   [phone]           Every allergy patient MUST wait 30 minutes after their allergy shot. No exceptions.  Xolair  shots #1-3 should plan to wait 2 hours in clinic Xolair shots after #4 should plan 30 minute wait in clinic            Dec 01, 2017 11:15 AM CST   Nurse Only with ALLERGY [facility] ([facility])    [address] [place] [address]   [phone]           Every allergy patient MUST wait 30 minutes after their allergy shot. No exceptions.  Xolair shots #1-3 should plan to wait 2 hours in clinic Xolair shots after #4 should plan 30 minute wait in clinic            Dec 08, 2017 11:15 AM CST   Nurse Only with ALLERGY [facility] ([facility])    [address] [place] [address]   [phone]           Every allergy patient MUST wait 30 minutes after their allergy shot. No exceptions.  Xolair shots #1-3 should plan to wait 2 hours in clinic Xolair shots after #4 should plan 30 minute wait in clinic            Dec 15, 2017 11:15 AM CST   Nurse Only with ALLERGY [facility] ([facility])    [address] [place] [address]   [phone]           Every allergy patient MUST wait 30 minutes after their allergy shot. No exceptions.  Xolair shots #1-3 should plan to wait 2 hours in clinic Xolair shots after #4 should plan 30 minute wait in clinic            Dec 22, 2017 11:15 AM CST   Nurse Only with ALLERGY [facility] ([facility])    [address] [place] [address]   [phone]           Every allergy patient MUST wait 30 minutes after their allergy shot. No exceptions.  Xolair shots #1-3 should plan to wait 2 hours in clinic Xolair shots after #4 should plan 30 minute wait in clinic            Dec 29, 2017 11:15 AM CST   Nurse Only with ALLERGY [facility] ([facility])    [address] [place] [address]   [phone]           Every allergy  "patient MUST wait 30 minutes after their allergy shot. No exceptions.  Xolair shots #1-3 should plan to wait 2 hours in clinic Xolair shots after #4 should plan 30 minute wait in clinic              Who to contact     If you have questions or need follow up information about today's clinic visit or your schedule please contact [facility] directly at [phone].  Normal or non-critical lab and imaging results will be communicated to you by Cint[employer], letter or phone within 4 business days after the clinic has received the results. If you do not hear from us within 7 days, please contact the clinic through Cintrich[employer] or phone. If you have a critical or abnormal lab result, we will notify you by phone as soon as possible.  Submit refill requests through "[employer]" or call your pharmacy and they will forward the refill request to us. Please allow 3 business days for your refill to be completed.          Additional Information About Your Visit        [employer] Information     "[employer]" lets you send messages to your doctor, view your test results, renew your prescriptions, schedule appointments and more. To sign up, go to [email or web address]" . Click on \"Log in\" on the left side of the screen, which will take you to the Welcome page. Then click on \"Sign up Now\" on the right side of the page.     You will be asked to enter the access code listed below, as well as some personal information. Please follow the directions to create your username and password.     Your access code is: [email or web address]  Expires: 2018 12:00 PM     Your access code will  in 90 days. If you need help or a new code, please call your [facility] or [phone].        Care EveryWhere ID     This is your Care EveryWhere ID. This could be used by other organizations to access your [place] medical records  [id number]         Blood Pressure from Last 3 Encounters:   17 117/78   17 124/78   17 131/84    Weight from " Last 3 Encounters:   09/29/17 169 lb 12.1 oz (77 kg)   06/01/17 168 lb 14.4 oz (76.6 kg)   04/21/17 170 lb 10.2 oz (77.4 kg)              We Performed the Following     Allergy Shot: Two or more injections        Primary Care Provider Office Phone # Fax #    [facility] [phone] [address] [facility] [address]        Equal Access to Services     EDUIN PENNINGTON : Hadii aad ku hadasho Soomaali, waaxda luqadaha, qaybta kaalmada adeegyada, waxay idiin hayaan adeeg kharash la'friedan meme. So [facility] [phone].    ATENCIÓN: Si habla español, tiene a spencer disposición servicios gratuitos de asistencia lingüística. [facility] [phone].    We comply with applicable federal civil rights laws and [place] laws. We do not discriminate on the basis of race, color, national origin, age, disability, sex, sexual orientation, or gender identity.            Thank you!     Thank you for choosing [facility]  for your care. Our goal is always to provide you with excellent care. Hearing back from our patients is one way we can continue to improve our services. Please take a few minutes to complete the written survey that you may receive in the mail after your visit with us. Thank you!             Your Updated Medication List - Protect others around you: Learn how to safely use, store and throw away your medicines at www.disposemymeds.org.          This list is accurate as of: 10/27/17  1:12 PM.  Always use your most recent med list.                   Brand Name Dispense Instructions for use Diagnosis    ADVIL 200 MG capsule   Generic drug:  ibuprofen      Take 200 mg by mouth every 4 hours as needed Reported on 4/28/2017        albuterol 108 (90 BASE) MCG/ACT Inhaler    PROAIR HFA/PROVENTIL HFA/VENTOLIN HFA     Inhale 2 puffs into the lungs every 4 hours as needed for shortness of breath / dyspnea or wheezing        * ALLERGEN IMMUNOTHERAPY PRESCRIPTION     5 mL    Name of Mix: Mix  #1  Cat, Dog Cat Hair, Standardized 10,000 BAU/mL, ALK  2.0 ml Dog Hair Dander, A. P.  1:100 w/v, HS  1.0 ml  Diluent: HSA qs to 5ml    Seasonal allergic rhinitis due to pollen, Non-seasonal allergic rhinitis due to animal hair and dander, Chronic allergic conjunctivitis, Need for desensitization to allergens       * ALLERGEN IMMUNOTHERAPY PRESCRIPTION     5 mL    Name of Mix: Mix #2  Tree  Birch Mix GLY 1:20 w/v, HS  0.5 ml Boxelder-Maple  Mix BHR (Boxelder Hard Red) 1:20 w/v, HS  0.5 ml [place] Mix GLY 1:20 w/v, HS 0.5 ml Oak Mix RVW GLY 1:20 w/v, HS 0.5 ml [place], Black GLY 1:20 w/v, HS 0.5 ml [place], Black GLY 1:20 w/v, HS 0.5 ml Diluent: HSA qs to 5ml    Chronic seasonal allergic rhinitis due to pollen, Non-seasonal allergic rhinitis due to animal hair and dander, Chronic allergic conjunctivitis, Need for desensitization to allergens       EPINEPHrine 0.3 MG/0.3ML injection 2-pack    AUVI-Q    0.6 mL    Inject 0.3 mLs (0.3 mg) into the muscle as needed for anaphylaxis    Seasonal allergic rhinitis due to pollen, Non-seasonal allergic rhinitis due to animal hair and dander       fluticasone 50 MCG/ACT spray    FLONASE    1 Bottle    Spray 2 sprays into both nostrils daily    Atopic rhinitis       * Notice:  This list has 2 medication(s) that are the same as other medications prescribed for you. Read the directions carefully, and ask your doctor or other care provider to review them with you.

## 2017-10-27 NOTE — PROGRESS NOTES
Patient presented after waiting 30 minutes with no reaction to  injections. Discharged from clinic.    Kriss Hamilton RN

## 2017-11-03 ENCOUNTER — ALLIED HEALTH/NURSE VISIT (OUTPATIENT)
Dept: ALLERGY | Facility: CLINIC | Age: 27
End: 2017-11-03
Payer: COMMERCIAL

## 2017-11-03 DIAGNOSIS — J30.1 CHRONIC ALLERGIC RHINITIS DUE TO POLLEN, UNSPECIFIED SEASONALITY: Primary | ICD-10-CM

## 2017-11-03 PROCEDURE — 95117 IMMUNOTHERAPY INJECTIONS: CPT

## 2017-11-03 NOTE — MR AVS SNAPSHOT
After Visit Summary   11/3/2017    Dmitri Mcguire    MRN: [id number]           Patient Information     Date Of Birth          1990        Visit Information        Provider Department      11/3/2017 11:15 AM ALLERGY [facility]        Today's Diagnoses     Chronic allergic rhinitis due to pollen, unspecified seasonality    -  1       Follow-ups after your visit        Your next 10 appointments already scheduled     Nov 10, 2017 11:15 AM CST   Nurse Only with ALLERGY [facility] ([facility])    [address] [address]   [phone]           Every allergy patient MUST wait 30 minutes after their allergy shot. No exceptions.  Xolair shots #1-3 should plan to wait 2 hours in clinic Xolair shots after #4 should plan 30 minute wait in clinic            Nov 17, 2017 11:15 AM CST   Nurse Only with ALLERGY [facility] ([facility])    [address] [address]   [phone]           Every allergy patient MUST wait 30 minutes after their allergy shot. No exceptions.  Xolair shots #1-3 should plan to wait 2 hours in clinic Xolair shots after #4 should plan 30 minute wait in clinic            Nov 24, 2017 11:15 AM CST   Nurse Only with ALLERGY [facility] ([facility])    [address] [address]   [phone]           Every allergy patient MUST wait 30 minutes after their allergy shot. No exceptions.  Xolair shots #1-3 should plan to wait 2 hours in clinic Xolair shots after #4 should plan 30 minute wait in clinic            Dec 01, 2017 11:15 AM CST   Nurse Only with ALLERGY [facility] ([facility])    [address] [address]   [phone]           Every allergy patient MUST wait 30 minutes after their allergy shot. No  exceptions.  Xolair shots #1-3 should plan to wait 2 hours in clinic Xolair shots after #4 should plan 30 minute wait in clinic            Dec 08, 2017 11:15 AM CST   Nurse Only with ALLERGY [facility] ([facility])    [address] [address]   [phone]           Every allergy patient MUST wait 30 minutes after their allergy shot. No exceptions.  Xolair shots #1-3 should plan to wait 2 hours in clinic Xolair shots after #4 should plan 30 minute wait in clinic            Dec 15, 2017 11:15 AM CST   Nurse Only with ALLERGY [facility] ([facility])    [address] [address]   [phone]           Every allergy patient MUST wait 30 minutes after their allergy shot. No exceptions.  Xolair shots #1-3 should plan to wait 2 hours in clinic Xolair shots after #4 should plan 30 minute wait in clinic            Dec 22, 2017 11:15 AM CST   Nurse Only with ALLERGY [facility] ([facility])    [address] [address]   [phone]           Every allergy patient MUST wait 30 minutes after their allergy shot. No exceptions.  Xolair shots #1-3 should plan to wait 2 hours in clinic Xolair shots after #4 should plan 30 minute wait in clinic            Dec 29, 2017 11:15 AM CST   Nurse Only with ALLERGY [facility] ([facility])    [address] [address]   [phone]           Every allergy patient MUST wait 30 minutes after their allergy shot. No exceptions.  Xolair shots #1-3 should plan to wait 2 hours in clinic Xolair shots after #4 should plan 30 minute wait in clinic              Who to contact     If you have questions or need follow up information about today's clinic visit or your schedule please contact [facility] directly at [phone].  Normal  "or non-critical lab and imaging results will be communicated to you by MyChart, letter or phone within 4 business days after the clinic has received the results. If you do not hear from us within 7 days, please contact the clinic through [employer] or phone. If you have a critical or abnormal lab result, we will notify you by phone as soon as possible.  Submit refill requests through [employer]ar or call your pharmacy and they will forward the refill request to us. Please allow 3 business days for your refill to be completed.          Additional Information About Your Visit        [employer] Information     [employer] lets you send messages to your doctor, view your test results, renew your prescriptions, schedule appointments and more. To sign up, go to [email or web address] . Click on \"Log in\" on the left side of the screen, which will take you to the Welcome page. Then click on \"Sign up Now\" on the right side of the page.     You will be asked to enter the access code listed below, as well as some personal information. Please follow the directions to create your username and password.     Your access code is: [email or web address]  Expires: 2018 12:00 PM     Your access code will  in 90 days. If you need help or a new code, please call your [place] clinic or [phone].        Care EveryWhere ID     This is your [facility] EveryWhere ID. This could be used by other organizations to access your [place] medical records  [id number]         Blood Pressure from Last 3 Encounters:   17 117/78   17 124/78   17 131/84    Weight from Last 3 Encounters:   17 77 kg (169 lb 12.1 oz)   17 76.6 kg (168 lb 14.4 oz)   17 77.4 kg (170 lb 10.2 oz)              We Performed the Following     Allergy Shot: Two or more injections        Primary Care Provider Office Phone # Fax #    [facility] [phone] [address] [facility] [address]        Equal Access to " Services     [facility]: Hadii aad ku hadjennifero Soxeniaali, waaxda luqadaha, qaybta kaalmada adeegmaryzita, josiah rayoosirismarco a rahman . So [facility] [phone].    ATENCIÓN: Si rolando jasso, tiene a spencer disposición servicios gratuitos de asistencia lingüística. Llame al [phone].    We comply with applicable federal civil rights laws and [place] laws. We do not discriminate on the basis of race, color, national origin, age, disability, sex, sexual orientation, or gender identity.            Thank you!     Thank you for choosing [facility]  for your care. Our goal is always to provide you with excellent care. Hearing back from our patients is one way we can continue to improve our services. Please take a few minutes to complete the written survey that you may receive in the mail after your visit with us. Thank you!             Your Updated Medication List - Protect others around you: Learn how to safely use, store and throw away your medicines at www.disposemymeds.org.          This list is accurate as of: 11/3/17 11:58 AM.  Always use your most recent med list.                   Brand Name Dispense Instructions for use Diagnosis    ADVIL 200 MG capsule   Generic drug:  ibuprofen      Take 200 mg by mouth every 4 hours as needed Reported on 4/28/2017        albuterol 108 (90 BASE) MCG/ACT Inhaler    PROAIR HFA/PROVENTIL HFA/VENTOLIN HFA     Inhale 2 puffs into the lungs every 4 hours as needed for shortness of breath / dyspnea or wheezing        * ALLERGEN IMMUNOTHERAPY PRESCRIPTION     5 mL    Name of Mix: Mix #1  Cat, Dog Cat Hair, Standardized 10,000 BAU/mL, ALK  2.0 ml Dog Hair Dander, A. P.  1:100 w/v, HS  1.0 ml  Diluent: HSA qs to 5ml    Seasonal allergic rhinitis due to pollen, Non-seasonal allergic rhinitis due to animal hair and dander, Chronic allergic conjunctivitis, Need for desensitization to allergens       * ALLERGEN IMMUNOTHERAPY PRESCRIPTION     5 mL    Name of Mix: Mix #2   Tree  Birch Mix GLY 1:20 w/v, HS  0.5 ml Boxelder-Maple  Mix BHR (Boxelder Hard Red) 1:20 w/v, HS  0.5 ml [place] Mix GLY 1:20 w/v, HS 0.5 ml Oak Mix RVW GLY 1:20 w/v, HS 0.5 ml [place] Tree, Black GLY 1:20 w/v, HS 0.5 ml [place], Black GLY 1:20 w/v, HS 0.5 ml Diluent: HSA qs to 5ml    Chronic seasonal allergic rhinitis due to pollen, Non-seasonal allergic rhinitis due to animal hair and dander, Chronic allergic conjunctivitis, Need for desensitization to allergens       EPINEPHrine 0.3 MG/0.3ML injection 2-pack    AUVI-Q    0.6 mL    Inject 0.3 mLs (0.3 mg) into the muscle as needed for anaphylaxis    Seasonal allergic rhinitis due to pollen, Non-seasonal allergic rhinitis due to animal hair and dander       fluticasone 50 MCG/ACT spray    FLONASE    1 Bottle    Spray 2 sprays into both nostrils daily    Atopic rhinitis       * Notice:  This list has 2 medication(s) that are the same as other medications prescribed for you. Read the directions carefully, and ask your doctor or other care provider to review them with you.

## 2017-11-10 ENCOUNTER — ALLIED HEALTH/NURSE VISIT (OUTPATIENT)
Dept: ALLERGY | Facility: CLINIC | Age: 27
End: 2017-11-10
Payer: COMMERCIAL

## 2017-11-10 DIAGNOSIS — J30.1 CHRONIC ALLERGIC RHINITIS DUE TO POLLEN, UNSPECIFIED SEASONALITY: Primary | ICD-10-CM

## 2017-11-10 PROCEDURE — 95117 IMMUNOTHERAPY INJECTIONS: CPT

## 2017-11-10 NOTE — MR AVS SNAPSHOT
After Visit Summary   11/10/2017    Dmitri Mcguire    MRN: [id number]           Patient Information     Date Of Birth          1990        Visit Information        Provider Department      11/10/2017 11:15 AM ALLERGY [facility]        Today's Diagnoses     Chronic allergic rhinitis due to pollen, unspecified seasonality    -  1       Follow-ups after your visit        Your next 10 appointments already scheduled     Nov 17, 2017 11:15 AM CST   Nurse Only with ALLERGY [facility] ([facility])    [address] [address]   [phone]           Every allergy patient MUST wait 30 minutes after their allergy shot. No exceptions.  Xolair shots #1-3 should plan to wait 2 hours in clinic Xolair shots after #4 should plan 30 minute wait in clinic            Nov 24, 2017 11:15 AM CST   Nurse Only with ALLERGY [facility] ([facility])    [address] [address]   [phone]           Every allergy patient MUST wait 30 minutes after their allergy shot. No exceptions.  Xolair shots #1-3 should plan to wait 2 hours in clinic Xolair shots after #4 should plan 30 minute wait in clinic            Dec 01, 2017 11:15 AM CST   Nurse Only with ALLERGY [facility] ([facility])    [address] [address]   [phone]           Every allergy patient MUST wait 30 minutes after their allergy shot. No exceptions.  Xolair shots #1-3 should plan to wait 2 hours in clinic Xolair shots after #4 should plan 30 minute wait in clinic            Dec 08, 2017 11:15 AM CST   Nurse Only with ALLERGY [facility] ([facility])    [address] [address]   [phone]           Every allergy patient MUST wait 30 minutes after their allergy shot. No  exceptions.  Xolair shots #1-3 should plan to wait 2 hours in clinic Xolair shots after #4 should plan 30 minute wait in clinic            Dec 15, 2017 11:15 AM CST   Nurse Only with ALLERGY [facility] ([facility])    [address] [address]   [phone]           Every allergy patient MUST wait 30 minutes after their allergy shot. No exceptions.  Xolair shots #1-3 should plan to wait 2 hours in clinic Xolair shots after #4 should plan 30 minute wait in clinic            Dec 22, 2017 11:15 AM CST   Nurse Only with ALLERGY [facility] ([facility])    [address] [address]   [phone]           Every allergy patient MUST wait 30 minutes after their allergy shot. No exceptions.  Xolair shots #1-3 should plan to wait 2 hours in clinic Xolair shots after #4 should plan 30 minute wait in clinic            Dec 29, 2017 11:15 AM CST   Nurse Only with ALLERGY [facility] ([facility])    [address] [address]   [phone]           Every allergy patient MUST wait 30 minutes after their allergy shot. No exceptions.  Xolair shots #1-3 should plan to wait 2 hours in clinic Xolair shots after #4 should plan 30 minute wait in clinic              Who to contact     If you have questions or need follow up information about today's clinic visit or your schedule please contact [facility] directly at [phone].  Normal or non-critical lab and imaging results will be communicated to you by MyChart, letter or phone within 4 business days after the clinic has received the results. If you do not hear from us within 7 days, please contact the clinic through MyChart or phone. If you have a critical or abnormal lab result, we will notify you by phone as soon as possible.  Submit refill requests through [employer] or call your  "pharmacy and they will forward the refill request to us. Please allow 3 business days for your refill to be completed.          Additional Information About Your Visit        MyChart Information     [employer] lets you send messages to your doctor, view your test results, renew your prescriptions, schedule appointments and more. To sign up, go to [email or web address] . Click on \"Log in\" on the left side of the screen, which will take you to the Welcome page. Then click on \"Sign up Now\" on the right side of the page.     You will be asked to enter the access code listed below, as well as some personal information. Please follow the directions to create your username and password.     Your access code is: [email or web address]  Expires: 2018 11:00 AM     Your access code will  in 90 days. If you need help or a new code, please call your [place] clinic or [phone].        Care EveryWhere ID     This is your [facility] EveryWhere ID. This could be used by other organizations to access your [place] medical records  [id number]         Blood Pressure from Last 3 Encounters:   17 117/78   17 124/78   17 131/84    Weight from Last 3 Encounters:   17 77 kg (169 lb 12.1 oz)   17 76.6 kg (168 lb 14.4 oz)   17 77.4 kg (170 lb 10.2 oz)              We Performed the Following     Allergy Shot: Two or more injections        Primary Care Provider Office Phone # Fax #    [facility] [phone] [address] [address]        Equal Access to Services     EDUIN PENNINGTON : Hadsteven Lyle, christine zuluaga, josiah sandoval. So [facility] [phone].    ATENCIÓN: Si habla español, tiene a spencer disposición servicios gratuitos de asistencia lingüística. Llame al [phone].    We comply with applicable federal civil rights laws and [place] laws. We do not discriminate on the basis of " race, color, national origin, age, disability, sex, sexual orientation, or gender identity.            Thank you!     Thank you for choosing [facility]  for your care. Our goal is always to provide you with excellent care. Hearing back from our patients is one way we can continue to improve our services. Please take a few minutes to complete the written survey that you may receive in the mail after your visit with us. Thank you!             Your Updated Medication List - Protect others around you: Learn how to safely use, store and throw away your medicines at www.disposemymeds.org.          This list is accurate as of: 11/10/17 11:55 AM.  Always use your most recent med list.                   Brand Name Dispense Instructions for use Diagnosis    ADVIL 200 MG capsule   Generic drug:  ibuprofen      Take 200 mg by mouth every 4 hours as needed Reported on 4/28/2017        albuterol 108 (90 BASE) MCG/ACT Inhaler    PROAIR HFA/PROVENTIL HFA/VENTOLIN HFA     Inhale 2 puffs into the lungs every 4 hours as needed for shortness of breath / dyspnea or wheezing        * ALLERGEN IMMUNOTHERAPY PRESCRIPTION     5 mL    Name of Mix: Mix #1  Cat, Dog Cat Hair, Standardized 10,000 BAU/mL, ALK  2.0 ml Dog Hair Dander, A. P.  1:100 w/v, HS  1.0 ml  Diluent: HSA qs to 5ml    Seasonal allergic rhinitis due to pollen, Non-seasonal allergic rhinitis due to animal hair and dander, Chronic allergic conjunctivitis, Need for desensitization to allergens       * ALLERGEN IMMUNOTHERAPY PRESCRIPTION     5 mL    Name of Mix: Mix #2  Tree  Birch Mix GLY 1:20 w/v, HS  0.5 ml Boxelder-Maple  Mix BHR (Boxelder Hard Red) 1:20 w/v, HS  0.5 ml [place] Mix GLY 1:20 w/v, HS 0.5 ml Oak Mix RVW GLY 1:20 w/v, HS 0.5 ml [place] Tree, Black GLY 1:20 w/v, HS 0.5 ml [place], Black GLY 1:20 w/v, HS 0.5 ml Diluent: HSA qs to 5ml    Chronic seasonal allergic rhinitis due to pollen, Non-seasonal allergic rhinitis due to animal hair and dander,  Chronic allergic conjunctivitis, Need for desensitization to allergens       EPINEPHrine 0.3 MG/0.3ML injection 2-pack    AUVI-Q    0.6 mL    Inject 0.3 mLs (0.3 mg) into the muscle as needed for anaphylaxis    Seasonal allergic rhinitis due to pollen, Non-seasonal allergic rhinitis due to animal hair and dander       fluticasone 50 MCG/ACT spray    FLONASE    1 Bottle    Spray 2 sprays into both nostrils daily    Atopic rhinitis       * Notice:  This list has 2 medication(s) that are the same as other medications prescribed for you. Read the directions carefully, and ask your doctor or other care provider to review them with you.

## 2017-11-17 ENCOUNTER — ALLIED HEALTH/NURSE VISIT (OUTPATIENT)
Dept: ALLERGY | Facility: CLINIC | Age: 27
End: 2017-11-17
Payer: COMMERCIAL

## 2017-11-17 DIAGNOSIS — J30.1 CHRONIC ALLERGIC RHINITIS DUE TO POLLEN, UNSPECIFIED SEASONALITY: Primary | ICD-10-CM

## 2017-11-17 PROCEDURE — 95117 IMMUNOTHERAPY INJECTIONS: CPT

## 2017-11-17 PROCEDURE — 99207 ZZC NO CHARGE LOS: CPT

## 2017-11-17 NOTE — MR AVS SNAPSHOT
After Visit Summary   11/17/2017    Dmitri Mcguire    MRN: [id number]           Patient Information     Date Of Birth          1990        Visit Information        Provider Department      11/17/2017 11:15 AM ALLERGY [facility]        Today's Diagnoses     Chronic allergic rhinitis due to pollen, unspecified seasonality    -  1       Follow-ups after your visit        Your next 10 appointments already scheduled     Nov 24, 2017 11:15 AM CST   Nurse Only with ALLERGY [facility] ([facility])    [address] [address]   [phone]           Every allergy patient MUST wait 30 minutes after their allergy shot. No exceptions.  Xolair shots #1-3 should plan to wait 2 hours in clinic Xolair shots after #4 should plan 30 minute wait in clinic            Dec 01, 2017 11:15 AM CST   Nurse Only with ALLERGY [facility] ([facility])    [address] [address]   [phone]           Every allergy patient MUST wait 30 minutes after their allergy shot. No exceptions.  Xolair shots #1-3 should plan to wait 2 hours in clinic Xolair shots after #4 should plan 30 minute wait in clinic            Dec 08, 2017 11:15 AM CST   Nurse Only with ALLERGY [facility] ([facility])    [address] [address]   [phone]           Every allergy patient MUST wait 30 minutes after their allergy shot. No exceptions.  Xolair shots #1-3 should plan to wait 2 hours in clinic Xolair shots after #4 should plan 30 minute wait in clinic            Dec 15, 2017 11:15 AM CST   Nurse Only with ALLERGY [facility] ([facility])    [address] [address]   [phone]           Every allergy patient MUST wait 30 minutes after their allergy shot. No  "exceptions.  Xolair shots #1-3 should plan to wait 2 hours in clinic Xolair shots after #4 should plan 30 minute wait in clinic            Dec 22, 2017 11:15 AM CST   Nurse Only with ALLERGY [facility] ([facility])    [address] [address]   [phone]           Every allergy patient MUST wait 30 minutes after their allergy shot. No exceptions.  Xolair shots #1-3 should plan to wait 2 hours in clinic Xolair shots after #4 should plan 30 minute wait in clinic            Dec 29, 2017 11:15 AM CST   Nurse Only with ALLERGY [facility] ([facility])    [address] [address]   [phone]           Every allergy patient MUST wait 30 minutes after their allergy shot. No exceptions.  Xolair shots #1-3 should plan to wait 2 hours in clinic Xolair shots after #4 should plan 30 minute wait in clinic              Who to contact     If you have questions or need follow up information about today's clinic visit or your schedule please contact [facility] directly at [phone].  Normal or non-critical lab and imaging results will be communicated to you by vitalcliphart, letter or phone within 4 business days after the clinic has received the results. If you do not hear from us within 7 days, please contact the clinic through vitalcliphart or phone. If you have a critical or abnormal lab result, we will notify you by phone as soon as possible.  Submit refill requests through [employer] or call your pharmacy and they will forward the refill request to us. Please allow 3 business days for your refill to be completed.          Additional Information About Your Visit        [employer] Information     [employer] lets you send messages to your doctor, view your test results, renew your prescriptions, schedule appointments and more. To sign up, go to [email or web address] . Click on \"Log in\" on the left " "side of the screen, which will take you to the Welcome page. Then click on \"Sign up Now\" on the right side of the page.     You will be asked to enter the access code listed below, as well as some personal information. Please follow the directions to create your username and password.     Your access code is: [email or web address]  Expires: 2018 11:00 AM     Your access code will  in 90 days. If you need help or a new code, please call your [place] clinic or [phone].        Care EveryWhere ID     This is your Care EveryWhere ID. This could be used by other organizations to access your [place] medical records  [id number]         Blood Pressure from Last 3 Encounters:   17 117/78   17 124/78   17 131/84    Weight from Last 3 Encounters:   17 77 kg (169 lb 12.1 oz)   17 76.6 kg (168 lb 14.4 oz)   17 77.4 kg (170 lb 10.2 oz)              We Performed the Following     Allergy Shot: Two or more injections        Primary Care Provider Office Phone # Fax #    [facility] [phone] [address] [address]        Equal Access to Services     EDUIN PENNINGTON AH: Hadii aurelio ku hadasho Soomaali, waaxda luqadaha, qaybta kaalmada adeegyada, josiah umanzor hayjorge alberto rahman . So [facility] [phone].    ATENCIÓN: Si habla español, tiene a spencer disposición servicios gratuitos de asistencia lingüística. Llame al [phone].    We comply with applicable federal civil rights laws and [place] laws. We do not discriminate on the basis of race, color, national origin, age, disability, sex, sexual orientation, or gender identity.            Thank you!     Thank you for choosing [facility]  for your care. Our goal is always to provide you with excellent care. Hearing back from our patients is one way we can continue to improve our services. Please take a few minutes to complete the written survey that you may receive in the " mail after your visit with us. Thank you!             Your Updated Medication List - Protect others around you: Learn how to safely use, store and throw away your medicines at www.disposemymeds.org.          This list is accurate as of: 11/17/17 12:01 PM.  Always use your most recent med list.                   Brand Name Dispense Instructions for use Diagnosis    ADVIL 200 MG capsule   Generic drug:  ibuprofen      Take 200 mg by mouth every 4 hours as needed Reported on 4/28/2017        albuterol 108 (90 BASE) MCG/ACT Inhaler    PROAIR HFA/PROVENTIL HFA/VENTOLIN HFA     Inhale 2 puffs into the lungs every 4 hours as needed for shortness of breath / dyspnea or wheezing        * ALLERGEN IMMUNOTHERAPY PRESCRIPTION     5 mL    Name of Mix: Mix #1  Cat, Dog Cat Hair, Standardized 10,000 BAU/mL, ALK  2.0 ml Dog Hair Dander, A. P.  1:100 w/v, HS  1.0 ml  Diluent: HSA qs to 5ml    Seasonal allergic rhinitis due to pollen, Non-seasonal allergic rhinitis due to animal hair and dander, Chronic allergic conjunctivitis, Need for desensitization to allergens       * ALLERGEN IMMUNOTHERAPY PRESCRIPTION     5 mL    Name of Mix: Mix #2  Tree  Birch Mix GLY 1:20 w/v, HS  0.5 ml Boxelder-Maple  Mix BHR (Boxelder Hard Red) 1:20 w/v, HS  0.5 ml [place] Mix GLY 1:20 w/v, HS 0.5 ml Oak Mix RVW GLY 1:20 w/v, HS 0.5 ml [place] Tree, Black GLY 1:20 w/v, HS 0.5 ml [place], Black GLY 1:20 w/v, HS 0.5 ml Diluent: HSA qs to 5ml    Chronic seasonal allergic rhinitis due to pollen, Non-seasonal allergic rhinitis due to animal hair and dander, Chronic allergic conjunctivitis, Need for desensitization to allergens       EPINEPHrine 0.3 MG/0.3ML injection 2-pack    AUVI-Q    0.6 mL    Inject 0.3 mLs (0.3 mg) into the muscle as needed for anaphylaxis    Seasonal allergic rhinitis due to pollen, Non-seasonal allergic rhinitis due to animal hair and dander       fluticasone 50 MCG/ACT spray    FLONASE    1 Bottle    Spray 2 sprays into both nostrils  daily    Atopic rhinitis       * Notice:  This list has 2 medication(s) that are the same as other medications prescribed for you. Read the directions carefully, and ask your doctor or other care provider to review them with you.

## 2017-11-24 ENCOUNTER — ALLIED HEALTH/NURSE VISIT (OUTPATIENT)
Dept: ALLERGY | Facility: CLINIC | Age: 27
End: 2017-11-24
Payer: COMMERCIAL

## 2017-11-24 DIAGNOSIS — J30.2 SEASONAL ALLERGIC RHINITIS: Primary | ICD-10-CM

## 2017-11-24 PROCEDURE — 95117 IMMUNOTHERAPY INJECTIONS: CPT

## 2017-11-24 NOTE — MR AVS SNAPSHOT
After Visit Summary   11/24/2017    Dmitri Mcguire    MRN: [id number]           Patient Information     Date Of Birth          1990        Visit Information        Provider Department      11/24/2017 11:15 AM ALLERGY [facility]        Today's Diagnoses     Seasonal allergic rhinitis    -  1       Follow-ups after your visit        Your next 10 appointments already scheduled     Dec 01, 2017 11:15 AM CST   Nurse Only with ALLERGY [facility] ([facility])    [address] [address]   [phone]           Every allergy patient MUST wait 30 minutes after their allergy shot. No exceptions.  Xolair shots #1-3 should plan to wait 2 hours in clinic Xolair shots after #4 should plan 30 minute wait in clinic            Dec 08, 2017 11:15 AM CST   Nurse Only with ALLERGY [facility] ([facility])    [address] [address]   [phone]           Every allergy patient MUST wait 30 minutes after their allergy shot. No exceptions.  Xolair shots #1-3 should plan to wait 2 hours in clinic Xolair shots after #4 should plan 30 minute wait in clinic            Dec 15, 2017 11:15 AM CST   Nurse Only with ALLERGY [facility] ([facility])    [address] [place] [address]   [phone]           Every allergy patient MUST wait 30 minutes after their allergy shot. No exceptions.  Xolair shots #1-3 should plan to wait 2 hours in clinic Xolair shots after #4 should plan 30 minute wait in clinic            Dec 22, 2017 11:15 AM CST   Nurse Only with ALLERGY [facility] ([facility])    [address] [place] [address]   [phone]           Every allergy patient MUST wait 30 minutes after their allergy shot. No exceptions.  Xolair shots #1-3 should plan  "to wait 2 hours in clinic Xolair shots after #4 should plan 30 minute wait in clinic            Dec 29, 2017 11:15 AM CST   Nurse Only with ALLERGY [facility] ([facility])    [address] [address]   [phone]           Every allergy patient MUST wait 30 minutes after their allergy shot. No exceptions.  Xolair shots #1-3 should plan to wait 2 hours in clinic Xolair shots after #4 should plan 30 minute wait in clinic              Who to contact     If you have questions or need follow up information about today's clinic visit or your schedule please contact [facility] directly at [phone].  Normal or non-critical lab and imaging results will be communicated to you by Facisharehart, letter or phone within 4 business days after the clinic has received the results. If you do not hear from us within 7 days, please contact the clinic through MyChart or phone. If you have a critical or abnormal lab result, we will notify you by phone as soon as possible.  Submit refill requests through [employer] or call your pharmacy and they will forward the refill request to us. Please allow 3 business days for your refill to be completed.          Additional Information About Your Visit        [employer] Information     [employer] lets you send messages to your doctor, view your test results, renew your prescriptions, schedule appointments and more. To sign up, go to [email or web address] . Click on \"Log in\" on the left side of the screen, which will take you to the Welcome page. Then click on \"Sign up Now\" on the right side of the page.     You will be asked to enter the access code listed below, as well as some personal information. Please follow the directions to create your username and password.     Your access code is: [email or web address]  Expires: 2018 11:00 AM     Your access code will  in 90 days. If you need help or a new code, please call your " [facility] or [phone].        Care EveryWhere ID     This is your Care EveryWhere ID. This could be used by other organizations to access your [place] medical records  [id number]         Blood Pressure from Last 3 Encounters:   09/29/17 117/78   06/01/17 124/78   04/28/17 131/84    Weight from Last 3 Encounters:   09/29/17 77 kg (169 lb 12.1 oz)   06/01/17 76.6 kg (168 lb 14.4 oz)   04/21/17 77.4 kg (170 lb 10.2 oz)              We Performed the Following     Allergy Shot: Two or more injections        Primary Care Provider Office Phone # Fax #    [facility] [phone] [address] [facility] [address]        Equal Access to Services     EDUIN PENNINGTON : Hadii aad ku hadasho Soomaali, waaxda luqadaha, qaybta kaalmada adeegyazita, josiah valentine. So [facility] [phone].    ATENCIÓN: Si habla español, tiene a spencer disposición servicios gratuitos de asistencia lingüística. Mushtaq al [phone].    We comply with applicable federal civil rights laws and [place] laws. We do not discriminate on the basis of race, color, national origin, age, disability, sex, sexual orientation, or gender identity.            Thank you!     Thank you for choosing [facility]  for your care. Our goal is always to provide you with excellent care. Hearing back from our patients is one way we can continue to improve our services. Please take a few minutes to complete the written survey that you may receive in the mail after your visit with us. Thank you!             Your Updated Medication List - Protect others around you: Learn how to safely use, store and throw away your medicines at www.disposemymeds.org.          This list is accurate as of: 11/24/17 11:58 AM.  Always use your most recent med list.                   Brand Name Dispense Instructions for use Diagnosis    ADVIL 200 MG capsule   Generic drug:  ibuprofen      Take 200 mg by mouth  every 4 hours as needed Reported on 4/28/2017        albuterol 108 (90 BASE) MCG/ACT Inhaler    PROAIR HFA/PROVENTIL HFA/VENTOLIN HFA     Inhale 2 puffs into the lungs every 4 hours as needed for shortness of breath / dyspnea or wheezing        * ALLERGEN IMMUNOTHERAPY PRESCRIPTION     5 mL    Name of Mix: Mix #1  Cat, Dog Cat Hair, Standardized 10,000 BAU/mL, ALK  2.0 ml Dog Hair Dander, A. P.  1:100 w/v, HS  1.0 ml  Diluent: HSA qs to 5ml    Seasonal allergic rhinitis due to pollen, Non-seasonal allergic rhinitis due to animal hair and dander, Chronic allergic conjunctivitis, Need for desensitization to allergens       * ALLERGEN IMMUNOTHERAPY PRESCRIPTION     5 mL    Name of Mix: Mix #2  Tree  Birch Mix GLY 1:20 w/v, HS  0.5 ml Boxelder-Maple  Mix BHR (Boxelder Hard Red) 1:20 w/v, HS  0.5 ml [place] Mix GLY 1:20 w/v, HS 0.5 ml Oak Mix RVW GLY 1:20 w/v, HS 0.5 ml [place] Tree, Black GLY 1:20 w/v, HS 0.5 ml [place], Black GLY 1:20 w/v, HS 0.5 ml Diluent: HSA qs to 5ml    Chronic seasonal allergic rhinitis due to pollen, Non-seasonal allergic rhinitis due to animal hair and dander, Chronic allergic conjunctivitis, Need for desensitization to allergens       EPINEPHrine 0.3 MG/0.3ML injection 2-pack    AUVI-Q    0.6 mL    Inject 0.3 mLs (0.3 mg) into the muscle as needed for anaphylaxis    Seasonal allergic rhinitis due to pollen, Non-seasonal allergic rhinitis due to animal hair and dander       fluticasone 50 MCG/ACT spray    FLONASE    1 Bottle    Spray 2 sprays into both nostrils daily    Atopic rhinitis       * Notice:  This list has 2 medication(s) that are the same as other medications prescribed for you. Read the directions carefully, and ask your doctor or other care provider to review them with you.

## 2017-12-08 ENCOUNTER — ALLIED HEALTH/NURSE VISIT (OUTPATIENT)
Dept: ALLERGY | Facility: CLINIC | Age: 27
End: 2017-12-08
Payer: COMMERCIAL

## 2017-12-08 DIAGNOSIS — J30.2 CHRONIC SEASONAL ALLERGIC RHINITIS DUE TO OTHER ALLERGEN: Primary | ICD-10-CM

## 2017-12-08 PROCEDURE — 95117 IMMUNOTHERAPY INJECTIONS: CPT

## 2017-12-08 PROCEDURE — 99207 ZZC NO CHARGE LOS: CPT

## 2017-12-08 NOTE — MR AVS SNAPSHOT
After Visit Summary   12/8/2017    Dmitri Mcguire    MRN: [id number]           Patient Information     Date Of Birth          1990        Visit Information        Provider Department      12/8/2017 11:15 AM ALLERGY [facility]        Today's Diagnoses     Chronic seasonal allergic rhinitis due to other allergen    -  1       Follow-ups after your visit        Your next 10 appointments already scheduled     Dec 15, 2017 11:15 AM CST   Nurse Only with ALLERGY [facility] ([facility])    [address] [place] [address]   [phone]           Every allergy patient MUST wait 30 minutes after their allergy shot. No exceptions.  Xolair shots #1-3 should plan to wait 2 hours in clinic Xolair shots after #4 should plan 30 minute wait in clinic            Dec 22, 2017 11:15 AM CST   Nurse Only with ALLERGY [facility] ([facility])    [address] [address]   [phone]           Every allergy patient MUST wait 30 minutes after their allergy shot. No exceptions.  Xolair shots #1-3 should plan to wait 2 hours in clinic Xolair shots after #4 should plan 30 minute wait in clinic            Dec 29, 2017 11:15 AM CST   Nurse Only with ALLERGY [facility] ([facility])    [address] [address]   [phone]           Every allergy patient MUST wait 30 minutes after their allergy shot. No exceptions.  Xolair shots #1-3 should plan to wait 2 hours in clinic Xolair shots after #4 should plan 30 minute wait in clinic            Jan 05, 2018  1:00 PM CST   Nurse Only with ALLERGY [facility] ([facility])    [address] [address]   [phone]           Every allergy patient MUST wait 30 minutes after their allergy shot. No exceptions.   "Xolair shots #1-3 should plan to wait 2 hours in clinic Xolair shots after #4 should plan 30 minute wait in clinic            Jan 12, 2018 11:00 AM CST   Nurse Only with ALLERGY [facility] ([facility])    [address] [address]   [phone]           Every allergy patient MUST wait 30 minutes after their allergy shot. No exceptions.  Xolair shots #1-3 should plan to wait 2 hours in clinic Xolair shots after #4 should plan 30 minute wait in clinic            Jan 19, 2018 11:15 AM CST   Nurse Only with ALLERGY [facility] ([facility])    [address] [address]   [phone]           Every allergy patient MUST wait 30 minutes after their allergy shot. No exceptions.  Xolair shots #1-3 should plan to wait 2 hours in clinic Xolair shots after #4 should plan 30 minute wait in clinic              Who to contact     If you have questions or need follow up information about today's clinic visit or your schedule please contact [facility] directly at [phone].  Normal or non-critical lab and imaging results will be communicated to you by MyChart, letter or phone within 4 business days after the clinic has received the results. If you do not hear from us within 7 days, please contact the clinic through DailyStrengthhart or phone. If you have a critical or abnormal lab result, we will notify you by phone as soon as possible.  Submit refill requests through [employer] or call your pharmacy and they will forward the refill request to us. Please allow 3 business days for your refill to be completed.          Additional Information About Your Visit        DailySt[employer] Information     Agent[employer] lets you send messages to your doctor, view your test results, renew your prescriptions, schedule appointments and more. To sign up, go to [email or web address] . Click on \"Log in\" on the left side of the " "screen, which will take you to the Welcome page. Then click on \"Sign up Now\" on the right side of the page.     You will be asked to enter the access code listed below, as well as some personal information. Please follow the directions to create your username and password.     Your access code is: [email or web address]  Expires: 2018 11:00 AM     Your access code will  in 90 days. If you need help or a new code, please call your [place] clinic or [phone].        Care EveryWhere ID     This is your Care EveryWhere ID. This could be used by other organizations to access your [place] medical records  [id number]         Blood Pressure from Last 3 Encounters:   17 117/78   17 124/78   17 131/84    Weight from Last 3 Encounters:   17 77 kg (169 lb 12.1 oz)   17 76.6 kg (168 lb 14.4 oz)   17 77.4 kg (170 lb 10.2 oz)              We Performed the Following     Allergy Shot: Two or more injections        Primary Care Provider Office Phone # Fax #    [facility] [phone] [address] [address]        Equal Access to Services     EDUIN PENNINGTON : Hadii aurelio ku hadasho Soomaali, waaxda luqadaha, qaybta kaalmada adeegyada, josiah valentine. So [facility] [phone].    ATENCIÓN: Si habla español, tiene a spencer disposición servicios gratuitos de asistencia lingüística. Llame al [phone].    We comply with applicable federal civil rights laws and [place] laws. We do not discriminate on the basis of race, color, national origin, age, disability, sex, sexual orientation, or gender identity.            Thank you!     Thank you for choosing [facility]  for your care. Our goal is always to provide you with excellent care. Hearing back from our patients is one way we can continue to improve our services. Please take a few minutes to complete the written survey that you may receive in the mail after " your visit with us. Thank you!             Your Updated Medication List - Protect others around you: Learn how to safely use, store and throw away your medicines at www.disposemymeds.org.          This list is accurate as of: 12/8/17 12:01 PM.  Always use your most recent med list.                   Brand Name Dispense Instructions for use Diagnosis    ADVIL 200 MG capsule   Generic drug:  ibuprofen      Take 200 mg by mouth every 4 hours as needed Reported on 4/28/2017        albuterol 108 (90 BASE) MCG/ACT Inhaler    PROAIR HFA/PROVENTIL HFA/VENTOLIN HFA     Inhale 2 puffs into the lungs every 4 hours as needed for shortness of breath / dyspnea or wheezing        * ALLERGEN IMMUNOTHERAPY PRESCRIPTION     5 mL    Name of Mix: Mix #1  Cat, Dog Cat Hair, Standardized 10,000 BAU/mL, ALK  2.0 ml Dog Hair Dander, A. P.  1:100 w/v, HS  1.0 ml  Diluent: HSA qs to 5ml    Seasonal allergic rhinitis due to pollen, Non-seasonal allergic rhinitis due to animal hair and dander, Chronic allergic conjunctivitis, Need for desensitization to allergens       * ALLERGEN IMMUNOTHERAPY PRESCRIPTION     5 mL    Name of Mix: Mix #2  Tree  Birch Mix GLY 1:20 w/v, HS  0.5 ml Boxelder-Maple  Mix BHR (Boxelder Hard Red) 1:20 w/v, HS  0.5 ml [place] Mix GLY 1:20 w/v, HS 0.5 ml Oak Mix RVW GLY 1:20 w/v, HS 0.5 ml [place] Tree, Black GLY 1:20 w/v, HS 0.5 ml [place], Black GLY 1:20 w/v, HS 0.5 ml Diluent: HSA qs to 5ml    Chronic seasonal allergic rhinitis due to pollen, Non-seasonal allergic rhinitis due to animal hair and dander, Chronic allergic conjunctivitis, Need for desensitization to allergens       EPINEPHrine 0.3 MG/0.3ML injection 2-pack    AUVI-Q    0.6 mL    Inject 0.3 mLs (0.3 mg) into the muscle as needed for anaphylaxis    Seasonal allergic rhinitis due to pollen, Non-seasonal allergic rhinitis due to animal hair and dander       fluticasone 50 MCG/ACT spray    FLONASE    1 Bottle    Spray 2 sprays into both nostrils daily     Atopic rhinitis       * Notice:  This list has 2 medication(s) that are the same as other medications prescribed for you. Read the directions carefully, and ask your doctor or other care provider to review them with you.

## 2017-12-08 NOTE — PROGRESS NOTES
Patient presented after waiting 30 minutes with no reaction to  injections. Discharged from clinic.  Mariana Sanabria - RN

## 2017-12-15 ENCOUNTER — ALLIED HEALTH/NURSE VISIT (OUTPATIENT)
Dept: ALLERGY | Facility: CLINIC | Age: 27
End: 2017-12-15
Payer: COMMERCIAL

## 2017-12-15 DIAGNOSIS — J30.2 CHRONIC SEASONAL ALLERGIC RHINITIS DUE TO OTHER ALLERGEN: Primary | ICD-10-CM

## 2017-12-15 PROCEDURE — 95117 IMMUNOTHERAPY INJECTIONS: CPT

## 2017-12-15 PROCEDURE — 99207 ZZC NO CHARGE LOS: CPT

## 2017-12-15 NOTE — MR AVS SNAPSHOT
After Visit Summary   12/15/2017    Dmitri Mcguire    MRN: [id number]           Patient Information     Date Of Birth          1990        Visit Information        Provider Department      12/15/2017 11:15 AM ALLERGY [facility]        Today's Diagnoses     Chronic seasonal allergic rhinitis due to other allergen    -  1       Follow-ups after your visit        Your next 10 appointments already scheduled     Dec 22, 2017 11:15 AM CST   Nurse Only with ALLERGY [facility] ([facility])    [address] [address]   [phone]           Every allergy patient MUST wait 30 minutes after their allergy shot. No exceptions.  Xolair shots #1-3 should plan to wait 2 hours in clinic Xolair shots after #4 should plan 30 minute wait in clinic            Dec 29, 2017 11:15 AM CST   Nurse Only with ALLERGY [facility] ([facility])    [address] [address]   [phone]           Every allergy patient MUST wait 30 minutes after their allergy shot. No exceptions.  Xolair shots #1-3 should plan to wait 2 hours in clinic Xolair shots after #4 should plan 30 minute wait in clinic            Jan 05, 2018  1:00 PM CST   Nurse Only with ALLERGY [facility] ([facility])    [address] [address]   [phone]           Every allergy patient MUST wait 30 minutes after their allergy shot. No exceptions.  Xolair shots #1-3 should plan to wait 2 hours in clinic Xolair shots after #4 should plan 30 minute wait in clinic            Jan 12, 2018 11:00 AM CST   Nurse Only with ALLERGY [facility] ([facility])    [address] [address]   [phone]           Every allergy patient MUST wait 30 minutes after their allergy shot. No exceptions.   "Xolair shots #1-3 should plan to wait 2 hours in clinic Xolair shots after #4 should plan 30 minute wait in clinic            2018 11:15 AM CST   Nurse Only with ALLERGY [facility] ([facility])    [address] [address]   [phone]           Every allergy patient MUST wait 30 minutes after their allergy shot. No exceptions.  Xolair shots #1-3 should plan to wait 2 hours in clinic Xolair shots after #4 should plan 30 minute wait in clinic              Who to contact     If you have questions or need follow up information about today's clinic visit or your schedule please contact [facility] directly at [phone].  Normal or non-critical lab and imaging results will be communicated to you by [employer]hart, letter or phone within 4 business days after the clinic has received the results. If you do not hear from us within 7 days, please contact the clinic through [employer]hart or phone. If you have a critical or abnormal lab result, we will notify you by phone as soon as possible.  Submit refill requests through [employer] or call your pharmacy and they will forward the refill request to us. Please allow 3 business days for your refill to be completed.          Additional Information About Your Visit        [employer]hart Information     [employer] lets you send messages to your doctor, view your test results, renew your prescriptions, schedule appointments and more. To sign up, go to [email or web address] . Click on \"Log in\" on the left side of the screen, which will take you to the Welcome page. Then click on \"Sign up Now\" on the right side of the page.     You will be asked to enter the access code listed below, as well as some personal information. Please follow the directions to create your username and password.     Your access code is: [email or web address]  Expires: 2018 11:00 AM     Your access code will  in 90 days. If you need help or a " new code, please call your [place] clinic or [phone].        Care EveryWhere ID     This is your Care EveryWhere ID. This could be used by other organizations to access your [place] medical records  [id number]         Blood Pressure from Last 3 Encounters:   09/29/17 117/78   06/01/17 124/78   04/28/17 131/84    Weight from Last 3 Encounters:   09/29/17 77 kg (169 lb 12.1 oz)   06/01/17 76.6 kg (168 lb 14.4 oz)   04/21/17 77.4 kg (170 lb 10.2 oz)              We Performed the Following     Allergy Shot: Two or more injections        Primary Care Provider Office Phone # Fax #    [facility] [phone] [address] [facility] [address]        Equal Access to Services     EDUIN PENNINGTON : Hadii aad ku hadasho Soomaali, waaxda luqadaha, qaybta kaalmada adeegyada, josiah umanzor hayjorge alberto rahman . So [facility] [phone].    ATENCIÓN: Si habla español, tiene a spencer disposición servicios gratuitos de asistencia lingüística. Mushtaq al [phone].    We comply with applicable federal civil rights laws and [place] laws. We do not discriminate on the basis of race, color, national origin, age, disability, sex, sexual orientation, or gender identity.            Thank you!     Thank you for choosing [facility]  for your care. Our goal is always to provide you with excellent care. Hearing back from our patients is one way we can continue to improve our services. Please take a few minutes to complete the written survey that you may receive in the mail after your visit with us. Thank you!             Your Updated Medication List - Protect others around you: Learn how to safely use, store and throw away your medicines at www.disposemymeds.org.          This list is accurate as of: 12/15/17 12:02 PM.  Always use your most recent med list.                   Brand Name Dispense Instructions for use Diagnosis    ADVIL 200 MG capsule   Generic drug:  ibuprofen       Take 200 mg by mouth every 4 hours as needed Reported on 4/28/2017        albuterol 108 (90 BASE) MCG/ACT Inhaler    PROAIR HFA/PROVENTIL HFA/VENTOLIN HFA     Inhale 2 puffs into the lungs every 4 hours as needed for shortness of breath / dyspnea or wheezing        * ALLERGEN IMMUNOTHERAPY PRESCRIPTION     5 mL    Name of Mix: Mix #1  Cat, Dog Cat Hair, Standardized 10,000 BAU/mL, ALK  2.0 ml Dog Hair Dander, A. P.  1:100 w/v, HS  1.0 ml  Diluent: HSA qs to 5ml    Seasonal allergic rhinitis due to pollen, Non-seasonal allergic rhinitis due to animal hair and dander, Chronic allergic conjunctivitis, Need for desensitization to allergens       * ALLERGEN IMMUNOTHERAPY PRESCRIPTION     5 mL    Name of Mix: Mix #2  Tree  Birch Mix GLY 1:20 w/v, HS  0.5 ml Boxelder-Maple  Mix BHR (Boxelder Hard Red) 1:20 w/v, HS  0.5 ml [place] Mix GLY 1:20 w/v, HS 0.5 ml Oak Mix RVW GLY 1:20 w/v, HS 0.5 ml [place] Tree, Black GLY 1:20 w/v, HS 0.5 ml [place], Black GLY 1:20 w/v, HS 0.5 ml Diluent: HSA qs to 5ml    Chronic seasonal allergic rhinitis due to pollen, Non-seasonal allergic rhinitis due to animal hair and dander, Chronic allergic conjunctivitis, Need for desensitization to allergens       EPINEPHrine 0.3 MG/0.3ML injection 2-pack    AUVI-Q    0.6 mL    Inject 0.3 mLs (0.3 mg) into the muscle as needed for anaphylaxis    Seasonal allergic rhinitis due to pollen, Non-seasonal allergic rhinitis due to animal hair and dander       fluticasone 50 MCG/ACT spray    FLONASE    1 Bottle    Spray 2 sprays into both nostrils daily    Atopic rhinitis       * Notice:  This list has 2 medication(s) that are the same as other medications prescribed for you. Read the directions carefully, and ask your doctor or other care provider to review them with you.

## 2017-12-29 ENCOUNTER — ALLIED HEALTH/NURSE VISIT (OUTPATIENT)
Dept: ALLERGY | Facility: CLINIC | Age: 27
End: 2017-12-29
Payer: COMMERCIAL

## 2017-12-29 DIAGNOSIS — J30.2 CHRONIC SEASONAL ALLERGIC RHINITIS DUE TO OTHER ALLERGEN: Primary | ICD-10-CM

## 2017-12-29 PROCEDURE — 95117 IMMUNOTHERAPY INJECTIONS: CPT

## 2017-12-29 PROCEDURE — 99207 ZZC NO CHARGE LOS: CPT

## 2017-12-29 NOTE — PROGRESS NOTES
Patient presented after waiting 30 minutes with no reaction to  injections. Discharged from clinic.    Vianney TOMLINSON RN

## 2017-12-29 NOTE — MR AVS SNAPSHOT
After Visit Summary   12/29/2017    Dmitri Mcguire    MRN: [id number]           Patient Information     Date Of Birth          1990        Visit Information        Provider Department      12/29/2017 9:30 AM ALLERGY [facility]        Today's Diagnoses     Chronic seasonal allergic rhinitis due to other allergen    -  1       Follow-ups after your visit        Your next 10 appointments already scheduled     Jan 05, 2018  1:00 PM CST   Nurse Only with ALLERGY [facility] ([facility])    [address] [address]   [phone]           Every allergy patient MUST wait 30 minutes after their allergy shot. No exceptions.  Xolair shots #1-3 should plan to wait 2 hours in clinic Xolair shots after #4 should plan 30 minute wait in clinic            Jan 12, 2018 11:00 AM CST   Nurse Only with ALLERGY [facility] ([facility])    [address] [address]   [phone]           Every allergy patient MUST wait 30 minutes after their allergy shot. No exceptions.  Xolair shots #1-3 should plan to wait 2 hours in clinic Xolair shots after #4 should plan 30 minute wait in clinic            Jan 19, 2018 11:15 AM CST   Nurse Only with ALLERGY [facility] ([facility])    [address] [address]   [phone]           Every allergy patient MUST wait 30 minutes after their allergy shot. No exceptions.  Xolair shots #1-3 should plan to wait 2 hours in clinic Xolair shots after #4 should plan 30 minute wait in clinic              Who to contact     If you have questions or need follow up information about today's clinic visit or your schedule please contact [facility] directly at [phone].  Normal or non-critical lab and imaging results will be communicated to you by MyChart, letter or  "phone within 4 business days after the clinic has received the results. If you do not hear from us within 7 days, please contact the clinic through 28msec or phone. If you have a critical or abnormal lab result, we will notify you by phone as soon as possible.  Submit refill requests through 28msec or call your pharmacy and they will forward the refill request to us. Please allow 3 business days for your refill to be completed.          Additional Information About Your Visit        28msec Information     28msec lets you send messages to your doctor, view your test results, renew your prescriptions, schedule appointments and more. To sign up, go to [email or web address] . Click on \"Log in\" on the left side of the screen, which will take you to the Welcome page. Then click on \"Sign up Now\" on the right side of the page.     You will be asked to enter the access code listed below, as well as some personal information. Please follow the directions to create your username and password.     Your access code is: [email or web address]  Expires: 2018 11:00 AM     Your access code will  in 90 days. If you need help or a new code, please call your [place] clinic or [phone].        Care EveryWhere ID     This is your Care EveryWhere ID. This could be used by other organizations to access your [place] medical records  [id number]         Blood Pressure from Last 3 Encounters:   17 117/78   17 124/78   17 131/84    Weight from Last 3 Encounters:   17 77 kg (169 lb 12.1 oz)   17 76.6 kg (168 lb 14.4 oz)   17 77.4 kg (170 lb 10.2 oz)              We Performed the Following     Allergy Shot: Two or more injections        Primary Care Provider Office Phone # Fax #    [facility] [phone] [address] [facility] [address]        Equal Access to Services     EDUIN PENNINGTON AH: Elsie Lyle, christine zuluaga, jose chavez " josiah lowery dennis storey'aan ah. So [facility] [phone].    ATENCIÓN: Si rolando jasso, tiene a spencer disposición servicios gratuitos de asistencia lingüística. Mushtaq al [phone].    We comply with applicable federal civil rights laws and [place] laws. We do not discriminate on the basis of race, color, national origin, age, disability, sex, sexual orientation, or gender identity.            Thank you!     Thank you for choosing [facility]  for your care. Our goal is always to provide you with excellent care. Hearing back from our patients is one way we can continue to improve our services. Please take a few minutes to complete the written survey that you may receive in the mail after your visit with us. Thank you!             Your Updated Medication List - Protect others around you: Learn how to safely use, store and throw away your medicines at www.disposemymeds.org.          This list is accurate as of: 12/29/17 10:13 AM.  Always use your most recent med list.                   Brand Name Dispense Instructions for use Diagnosis    ADVIL 200 MG capsule   Generic drug:  ibuprofen      Take 200 mg by mouth every 4 hours as needed Reported on 4/28/2017        albuterol 108 (90 BASE) MCG/ACT Inhaler    PROAIR HFA/PROVENTIL HFA/VENTOLIN HFA     Inhale 2 puffs into the lungs every 4 hours as needed for shortness of breath / dyspnea or wheezing        * ALLERGEN IMMUNOTHERAPY PRESCRIPTION     5 mL    Name of Mix: Mix #1  Cat, Dog Cat Hair, Standardized 10,000 BAU/mL, ALK  2.0 ml Dog Hair Dander, A. P.  1:100 w/v, HS  1.0 ml  Diluent: HSA qs to 5ml    Seasonal allergic rhinitis due to pollen, Non-seasonal allergic rhinitis due to animal hair and dander, Chronic allergic conjunctivitis, Need for desensitization to allergens       * ALLERGEN IMMUNOTHERAPY PRESCRIPTION     5 mL    Name of Mix: Mix #2  Tree  Birch Mix GLY 1:20 w/v, HS  0.5 ml Boxelder-Maple  Mix BHR (Boxelder Hard Red) 1:20  w/v, HS  0.5 ml [place] Mix GLY 1:20 w/v, HS 0.5 ml Oak Mix RVW GLY 1:20 w/v, HS 0.5 ml [place] Tree, Black GLY 1:20 w/v, HS 0.5 ml [place], Black GLY 1:20 w/v, HS 0.5 ml Diluent: HSA qs to 5ml    Chronic seasonal allergic rhinitis due to pollen, Non-seasonal allergic rhinitis due to animal hair and dander, Chronic allergic conjunctivitis, Need for desensitization to allergens       EPINEPHrine 0.3 MG/0.3ML injection 2-pack    AUVI-Q    0.6 mL    Inject 0.3 mLs (0.3 mg) into the muscle as needed for anaphylaxis    Seasonal allergic rhinitis due to pollen, Non-seasonal allergic rhinitis due to animal hair and dander       fluticasone 50 MCG/ACT spray    FLONASE    1 Bottle    Spray 2 sprays into both nostrils daily    Atopic rhinitis       * Notice:  This list has 2 medication(s) that are the same as other medications prescribed for you. Read the directions carefully, and ask your doctor or other care provider to review them with you.

## 2018-01-12 ENCOUNTER — ALLIED HEALTH/NURSE VISIT (OUTPATIENT)
Dept: ALLERGY | Facility: CLINIC | Age: 28
End: 2018-01-12
Payer: COMMERCIAL

## 2018-01-12 DIAGNOSIS — J30.2 CHRONIC SEASONAL ALLERGIC RHINITIS DUE TO OTHER ALLERGEN: Primary | ICD-10-CM

## 2018-01-12 PROCEDURE — 99207 ZZC NO CHARGE LOS: CPT

## 2018-01-12 PROCEDURE — 95117 IMMUNOTHERAPY INJECTIONS: CPT

## 2018-01-12 NOTE — MR AVS SNAPSHOT
"              After Visit Summary   1/12/2018    Dmitri Mcguire    MRN: [id number]           Patient Information     Date Of Birth          1990        Visit Information        Provider Department      1/12/2018 4:00 PM ALLERGY [facility]        Today's Diagnoses     Chronic seasonal allergic rhinitis due to other allergen    -  1       Follow-ups after your visit        Your next 10 appointments already scheduled     Jan 19, 2018  4:00 PM CST   Nurse Only with ALLERGY [facility] ([facility])    [address] [address] [phone]           Every allergy patient MUST wait 30 minutes after their allergy shot. No exceptions.  Xolair shots #1-3 should plan to wait 2 hours in clinic Xolair shots after #4 should plan 30 minute wait in clinic              Who to contact     If you have questions or need follow up information about today's clinic visit or your schedule please contact [facility] directly at [phone].  Normal or non-critical lab and imaging results will be communicated to you by Cloutexhart, letter or phone within 4 business days after the clinic has received the results. If you do not hear from us within 7 days, please contact the clinic through [employer] or phone. If you have a critical or abnormal lab result, we will notify you by phone as soon as possible.  Submit refill requests through CollegeSolved or call your pharmacy and they will forward the refill request to us. Please allow 3 business days for your refill to be completed.          Additional Information About Your Visit        CloutexharTelerivet Information     CollegeSolved lets you send messages to your doctor, view your test results, renew your prescriptions, schedule appointments and more. To sign up, go to [email or web address] . Click on \"Log in\" on the left side of the screen, which will take you to the Welcome page. Then click on \"Sign up Now\" on " the right side of the page.     You will be asked to enter the access code listed below, as well as some personal information. Please follow the directions to create your username and password.     Your access code is: [email or web address]  Expires: 2018 11:00 AM     Your access code will  in 90 days. If you need help or a new code, please call your [place] clinic or [phone].        Care EveryWhere ID     This is your Care EveryWhere ID. This could be used by other organizations to access your [place] medical records  [id number]         Blood Pressure from Last 3 Encounters:   17 117/78   17 124/78   17 131/84    Weight from Last 3 Encounters:   17 77 kg (169 lb 12.1 oz)   17 76.6 kg (168 lb 14.4 oz)   17 77.4 kg (170 lb 10.2 oz)              We Performed the Following     Allergy Shot: Two or more injections        Primary Care Provider Office Phone # Fax #    [facility] [phone] [address] [facility] [address]        Equal Access to Services     EDUIN PENNINGTON AH: Hadii aurelio ku hadasho Soxeniaali, waaxda luqadaha, qaybta kaalmada adeegyada, josiah valentine. So [facility] [phone].    ATENCIÓN: Si habla español, tiene a spencer disposición servicios gratuitos de asistencia lingüística. Llame al [phone].    We comply with applicable federal civil rights laws and [place] laws. We do not discriminate on the basis of race, color, national origin, age, disability, sex, sexual orientation, or gender identity.            Thank you!     Thank you for choosing [facility]  for your care. Our goal is always to provide you with excellent care. Hearing back from our patients is one way we can continue to improve our services. Please take a few minutes to complete the written survey that you may receive in the mail after your visit with us. Thank you!             Your Updated Medication List -  Protect others around you: Learn how to safely use, store and throw away your medicines at www.disposemymeds.org.          This list is accurate as of: 1/12/18  4:38 PM.  Always use your most recent med list.                   Brand Name Dispense Instructions for use Diagnosis    ADVIL 200 MG capsule   Generic drug:  ibuprofen      Take 200 mg by mouth every 4 hours as needed Reported on 4/28/2017        albuterol 108 (90 BASE) MCG/ACT Inhaler    PROAIR HFA/PROVENTIL HFA/VENTOLIN HFA     Inhale 2 puffs into the lungs every 4 hours as needed for shortness of breath / dyspnea or wheezing        * ALLERGEN IMMUNOTHERAPY PRESCRIPTION     5 mL    Name of Mix: Mix #1  Cat, Dog Cat Hair, Standardized 10,000 BAU/mL, ALK  2.0 ml Dog Hair Dander, A. P.  1:100 w/v, HS  1.0 ml  Diluent: HSA qs to 5ml    Seasonal allergic rhinitis due to pollen, Non-seasonal allergic rhinitis due to animal hair and dander, Chronic allergic conjunctivitis, Need for desensitization to allergens       * ALLERGEN IMMUNOTHERAPY PRESCRIPTION     5 mL    Name of Mix: Mix #2  Tree  Birch Mix GLY 1:20 w/v, HS  0.5 ml Boxelder-Maple  Mix BHR (Boxelder Hard Red) 1:20 w/v, HS  0.5 ml [place] Mix GLY 1:20 w/v, HS 0.5 ml Oak Mix RVW GLY 1:20 w/v, HS 0.5 ml [place] Tree, Black GLY 1:20 w/v, HS 0.5 ml [place], Black GLY 1:20 w/v, HS 0.5 ml Diluent: HSA qs to 5ml    Chronic seasonal allergic rhinitis due to pollen, Non-seasonal allergic rhinitis due to animal hair and dander, Chronic allergic conjunctivitis, Need for desensitization to allergens       EPINEPHrine 0.3 MG/0.3ML injection 2-pack    AUVI-Q    0.6 mL    Inject 0.3 mLs (0.3 mg) into the muscle as needed for anaphylaxis    Seasonal allergic rhinitis due to pollen, Non-seasonal allergic rhinitis due to animal hair and dander       fluticasone 50 MCG/ACT spray    FLONASE    1 Bottle    Spray 2 sprays into both nostrils daily    Atopic rhinitis       * Notice:  This list has 2 medication(s) that are the  same as other medications prescribed for you. Read the directions carefully, and ask your doctor or other care provider to review them with you.

## 2018-01-19 ENCOUNTER — ALLIED HEALTH/NURSE VISIT (OUTPATIENT)
Dept: ALLERGY | Facility: CLINIC | Age: 28
End: 2018-01-19
Payer: COMMERCIAL

## 2018-01-19 DIAGNOSIS — J30.2 CHRONIC SEASONAL ALLERGIC RHINITIS DUE TO OTHER ALLERGEN: Primary | ICD-10-CM

## 2018-01-19 PROCEDURE — 99207 ZZC DROP WITH A PROCEDURE: CPT

## 2018-01-19 PROCEDURE — 95117 IMMUNOTHERAPY INJECTIONS: CPT

## 2018-01-19 NOTE — MR AVS SNAPSHOT
After Visit Summary   1/19/2018    Dmitri Mcguire    MRN: [id number]           Patient Information     Date Of Birth          1990        Visit Information        Provider Department      1/19/2018 4:00 PM ALLERGY [facility]        Today's Diagnoses     Chronic seasonal allergic rhinitis due to other allergen    -  1       Follow-ups after your visit        Your next 10 appointments already scheduled     Jan 26, 2018  4:00 PM CST   Nurse Only with ALLERGY [facility] ([facility])    [address] [address]   [phone]           Every allergy patient MUST wait 30 minutes after their allergy shot. No exceptions.  Xolair shots #1-3 should plan to wait 2 hours in clinic Xolair shots after #4 should plan 30 minute wait in clinic            Feb 02, 2018  4:00 PM CST   Nurse Only with ALLERGY [facility] ([facility])    [address] [address]   [phone]           Every allergy patient MUST wait 30 minutes after their allergy shot. No exceptions.  Xolair shots #1-3 should plan to wait 2 hours in clinic Xolair shots after #4 should plan 30 minute wait in clinic            Feb 09, 2018  4:00 PM CST   Nurse Only with ALLERGY [facility] ([facility])    [address] [address]   [phone]           Every allergy patient MUST wait 30 minutes after their allergy shot. No exceptions.  Xolair shots #1-3 should plan to wait 2 hours in clinic Xolair shots after #4 should plan 30 minute wait in clinic            Feb 16, 2018  4:00 PM CST   Nurse Only with ALLERGY [facility] ([facility])    [address] [place] [address]   [phone]           Every allergy patient MUST wait 30 minutes after their allergy shot. No exceptions.   "Xolair shots #1-3 should plan to wait 2 hours in clinic Xolair shots after #4 should plan 30 minute wait in clinic            2018  4:00 PM CST   Nurse Only with ALLERGY [facility] ([facility])    [address] [address]   [phone]           Every allergy patient MUST wait 30 minutes after their allergy shot. No exceptions.  Xolair shots #1-3 should plan to wait 2 hours in clinic Xolair shots after #4 should plan 30 minute wait in clinic              Who to contact     If you have questions or need follow up information about today's clinic visit or your schedule please contact [facility] directly at [phone].  Normal or non-critical lab and imaging results will be communicated to you by InfluxDBhart, letter or phone within 4 business days after the clinic has received the results. If you do not hear from us within 7 days, please contact the clinic through InfluxDBhart or phone. If you have a critical or abnormal lab result, we will notify you by phone as soon as possible.  Submit refill requests through SmartAsset or call your pharmacy and they will forward the refill request to us. Please allow 3 business days for your refill to be completed.          Additional Information About Your Visit        InfluxDBhart Information     SmartAsset lets you send messages to your doctor, view your test results, renew your prescriptions, schedule appointments and more. To sign up, go to [email or web address] . Click on \"Log in\" on the left side of the screen, which will take you to the Welcome page. Then click on \"Sign up Now\" on the right side of the page.     You will be asked to enter the access code listed below, as well as some personal information. Please follow the directions to create your username and password.     Your access code is: [email or web address]  Expires: 2018  4:40 PM     Your access code will  in 90 days. If you need help or a " new code, please call your [place] clinic or [phone].        Care EveryWhere ID     This is your Care EveryWhere ID. This could be used by other organizations to access your [place] medical records  [id number]         Blood Pressure from Last 3 Encounters:   09/29/17 117/78   06/01/17 124/78   04/28/17 131/84    Weight from Last 3 Encounters:   09/29/17 77 kg (169 lb 12.1 oz)   06/01/17 76.6 kg (168 lb 14.4 oz)   04/21/17 77.4 kg (170 lb 10.2 oz)              We Performed the Following     Allergy Shot: Two or more injections        Primary Care Provider Office Phone # Fax #    [facility] [place] [phone] [address] [facility] [address]        Equal Access to Services     EDUIN PENNINGTON : Hadii aad ku hadasho Soomaali, waaxda luqadaha, qaybta kaalmada adeegyada, josiah umanzor hayjorge alberto rahman . So [facility] [phone].    ATENCIÓN: Si habla español, tiene a spencer disposición servicios gratuitos de asistencia lingüística. Mushtaq al [phone].    We comply with applicable federal civil rights laws and [place] laws. We do not discriminate on the basis of race, color, national origin, age, disability, sex, sexual orientation, or gender identity.            Thank you!     Thank you for choosing [facility]  for your care. Our goal is always to provide you with excellent care. Hearing back from our patients is one way we can continue to improve our services. Please take a few minutes to complete the written survey that you may receive in the mail after your visit with us. Thank you!             Your Updated Medication List - Protect others around you: Learn how to safely use, store and throw away your medicines at www.disposemymeds.org.          This list is accurate as of: 1/19/18  4:40 PM.  Always use your most recent med list.                   Brand Name Dispense Instructions for use Diagnosis    ADVIL 200 MG capsule   Generic drug:  ibuprofen       Take 200 mg by mouth every 4 hours as needed Reported on 4/28/2017        albuterol 108 (90 BASE) MCG/ACT Inhaler    PROAIR HFA/PROVENTIL HFA/VENTOLIN HFA     Inhale 2 puffs into the lungs every 4 hours as needed for shortness of breath / dyspnea or wheezing        * ALLERGEN IMMUNOTHERAPY PRESCRIPTION     5 mL    Name of Mix: Mix #1  Cat, Dog Cat Hair, Standardized 10,000 BAU/mL, ALK  2.0 ml Dog Hair Dander, A. P.  1:100 w/v, HS  1.0 ml  Diluent: HSA qs to 5ml    Seasonal allergic rhinitis due to pollen, Non-seasonal allergic rhinitis due to animal hair and dander, Chronic allergic conjunctivitis, Need for desensitization to allergens       * ALLERGEN IMMUNOTHERAPY PRESCRIPTION     5 mL    Name of Mix: Mix #2  Tree  Birch Mix GLY 1:20 w/v, HS  0.5 ml Boxelder-Maple  Mix BHR (Boxelder Hard Red) 1:20 w/v, HS  0.5 ml [place] Mix GLY 1:20 w/v, HS 0.5 ml Oak Mix RVW GLY 1:20 w/v, HS 0.5 ml [place] Tree, Black GLY 1:20 w/v, HS 0.5 ml [place], Black GLY 1:20 w/v, HS 0.5 ml Diluent: HSA qs to 5ml    Chronic seasonal allergic rhinitis due to pollen, Non-seasonal allergic rhinitis due to animal hair and dander, Chronic allergic conjunctivitis, Need for desensitization to allergens       EPINEPHrine 0.3 MG/0.3ML injection 2-pack    AUVI-Q    0.6 mL    Inject 0.3 mLs (0.3 mg) into the muscle as needed for anaphylaxis    Seasonal allergic rhinitis due to pollen, Non-seasonal allergic rhinitis due to animal hair and dander       fluticasone 50 MCG/ACT spray    FLONASE    1 Bottle    Spray 2 sprays into both nostrils daily    Atopic rhinitis       * Notice:  This list has 2 medication(s) that are the same as other medications prescribed for you. Read the directions carefully, and ask your doctor or other care provider to review them with you.

## 2018-02-02 ENCOUNTER — ALLIED HEALTH/NURSE VISIT (OUTPATIENT)
Dept: ALLERGY | Facility: CLINIC | Age: 28
End: 2018-02-02
Payer: COMMERCIAL

## 2018-02-02 DIAGNOSIS — J30.2 CHRONIC SEASONAL ALLERGIC RHINITIS DUE TO OTHER ALLERGEN: Primary | ICD-10-CM

## 2018-02-02 PROCEDURE — 95117 IMMUNOTHERAPY INJECTIONS: CPT

## 2018-02-02 PROCEDURE — 99207 ZZC DROP WITH A PROCEDURE: CPT

## 2018-02-02 NOTE — PROGRESS NOTES
Patient presented after waiting 30 minutes with no reaction to  injections. Discharged from clinic.    Mariana VIRAMONTES   [facility] RN

## 2018-02-02 NOTE — MR AVS SNAPSHOT
After Visit Summary   2/2/2018    Dmitri Mcguire    MRN: [id number]           Patient Information     Date Of Birth          1990        Visit Information        Provider Department      2/2/2018 4:00 PM ALLERGY [facility]        Today's Diagnoses     Chronic seasonal allergic rhinitis due to other allergen    -  1       Follow-ups after your visit        Your next 10 appointments already scheduled     Feb 09, 2018  4:00 PM CST   Nurse Only with ALLERGY [facility] ([facility])    [address] [address]   [phone]           Every allergy patient MUST wait 30 minutes after their allergy shot. No exceptions.  Xolair shots #1-3 should plan to wait 2 hours in clinic Xolair shots after #4 should plan 30 minute wait in clinic            Feb 16, 2018  4:00 PM CST   Nurse Only with ALLERGY [facility] ([facility])    [address] [address]   [phone]           Every allergy patient MUST wait 30 minutes after their allergy shot. No exceptions.  Xolair shots #1-3 should plan to wait 2 hours in clinic Xolair shots after #4 should plan 30 minute wait in clinic            Feb 23, 2018  4:00 PM CST   Nurse Only with ALLERGY [facility] ([facility])    [address] [address]   [phone]           Every allergy patient MUST wait 30 minutes after their allergy shot. No exceptions.  Xolair shots #1-3 should plan to wait 2 hours in clinic Xolair shots after #4 should plan 30 minute wait in clinic              Who to contact     If you have questions or need follow up information about today's clinic visit or your schedule please contact [facility] directly at [phone].  Normal or non-critical lab and imaging results will be communicated to you by MyChart, letter or  "phone within 4 business days after the clinic has received the results. If you do not hear from us within 7 days, please contact the clinic through [employer] or phone. If you have a critical or abnormal lab result, we will notify you by phone as soon as possible.  Submit refill requests through [employer] or call your pharmacy and they will forward the refill request to us. Please allow 3 business days for your refill to be completed.          Additional Information About Your Visit        [employer] Information     [employer] lets you send messages to your doctor, view your test results, renew your prescriptions, schedule appointments and more. To sign up, go to [email or web address] . Click on \"Log in\" on the left side of the screen, which will take you to the Welcome page. Then click on \"Sign up Now\" on the right side of the page.     You will be asked to enter the access code listed below, as well as some personal information. Please follow the directions to create your username and password.     Your access code is: [email or web address]  Expires: 2018  4:40 PM     Your access code will  in 90 days. If you need help or a new code, please call your [place] clinic or [phone].        Care EveryWhere ID     This is your Care EveryWhere ID. This could be used by other organizations to access your [place] medical records  [id number]         Blood Pressure from Last 3 Encounters:   17 117/78   17 124/78   17 131/84    Weight from Last 3 Encounters:   17 77 kg (169 lb 12.1 oz)   17 76.6 kg (168 lb 14.4 oz)   17 77.4 kg (170 lb 10.2 oz)              We Performed the Following     Allergy Shot: Two or more injections        Primary Care Provider Office Phone # Fax #    [facility] [phone] [address] [facility] [address]        Equal Access to Services     EDUIN PENNINGTON AH: Elsie Lyle, christine zuluaga, jose chavez " josiah lowery dennis storey'aan ah. So [facility] [place] [phone].    ATENCIÓN: Si rolando jasso, tiene a spencer disposición servicios gratuitos de asistencia lingüística. Mushtaq al [phone].    We comply with applicable federal civil rights laws and [place] laws. We do not discriminate on the basis of race, color, national origin, age, disability, sex, sexual orientation, or gender identity.            Thank you!     Thank you for choosing [facility]  for your care. Our goal is always to provide you with excellent care. Hearing back from our patients is one way we can continue to improve our services. Please take a few minutes to complete the written survey that you may receive in the mail after your visit with us. Thank you!             Your Updated Medication List - Protect others around you: Learn how to safely use, store and throw away your medicines at www.disposemymeds.org.          This list is accurate as of 2/2/18  4:07 PM.  Always use your most recent med list.                   Brand Name Dispense Instructions for use Diagnosis    ADVIL 200 MG capsule   Generic drug:  ibuprofen      Take 200 mg by mouth every 4 hours as needed Reported on 4/28/2017        albuterol 108 (90 BASE) MCG/ACT Inhaler    PROAIR HFA/PROVENTIL HFA/VENTOLIN HFA     Inhale 2 puffs into the lungs every 4 hours as needed for shortness of breath / dyspnea or wheezing        * ALLERGEN IMMUNOTHERAPY PRESCRIPTION     5 mL    Name of Mix: Mix #1  Cat, Dog Cat Hair, Standardized 10,000 BAU/mL, ALK  2.0 ml Dog Hair Dander, A. P.  1:100 w/v, HS  1.0 ml  Diluent: HSA qs to 5ml    Seasonal allergic rhinitis due to pollen, Non-seasonal allergic rhinitis due to animal hair and dander, Chronic allergic conjunctivitis, Need for desensitization to allergens       * ALLERGEN IMMUNOTHERAPY PRESCRIPTION     5 mL    Name of Mix: Mix #2  Tree  Birch Mix GLY 1:20 w/v, HS  0.5 ml Boxelder-Maple  Mix BHR (Boxelder Hard Red) 1:20  w/v, HS  0.5 ml [place] Mix GLY 1:20 w/v, HS 0.5 ml Oak Mix RVW GLY 1:20 w/v, HS 0.5 ml [place] Tree, Black GLY 1:20 w/v, HS 0.5 ml [place], Black GLY 1:20 w/v, HS 0.5 ml Diluent: HSA qs to 5ml    Chronic seasonal allergic rhinitis due to pollen, Non-seasonal allergic rhinitis due to animal hair and dander, Chronic allergic conjunctivitis, Need for desensitization to allergens       EPINEPHrine 0.3 MG/0.3ML injection 2-pack    AUVI-Q    0.6 mL    Inject 0.3 mLs (0.3 mg) into the muscle as needed for anaphylaxis    Seasonal allergic rhinitis due to pollen, Non-seasonal allergic rhinitis due to animal hair and dander       fluticasone 50 MCG/ACT spray    FLONASE    1 Bottle    Spray 2 sprays into both nostrils daily    Atopic rhinitis       * Notice:  This list has 2 medication(s) that are the same as other medications prescribed for you. Read the directions carefully, and ask your doctor or other care provider to review them with you.

## 2018-02-09 ENCOUNTER — ALLIED HEALTH/NURSE VISIT (OUTPATIENT)
Dept: ALLERGY | Facility: CLINIC | Age: 28
End: 2018-02-09
Payer: COMMERCIAL

## 2018-02-09 DIAGNOSIS — J30.9 ALLERGIC RHINITIS: Primary | ICD-10-CM

## 2018-02-09 PROCEDURE — 95117 IMMUNOTHERAPY INJECTIONS: CPT

## 2018-02-09 PROCEDURE — 99207 ZZC DROP WITH A PROCEDURE: CPT

## 2018-02-09 NOTE — MR AVS SNAPSHOT
After Visit Summary   2/9/2018    Dmitri Mcguire    MRN: [id number]           Patient Information     Date Of Birth          1990        Visit Information        Provider Department      2/9/2018 4:00 PM ALLERGY [facility]        Today's Diagnoses     Allergic rhinitis    -  1       Follow-ups after your visit        Your next 10 appointments already scheduled     Feb 16, 2018  4:00 PM CST   Nurse Only with ALLERGY [facility] ([facility])    [address] [address]   [phone]           Every allergy patient MUST wait 30 minutes after their allergy shot. No exceptions.  Xolair shots #1-3 should plan to wait 2 hours in clinic Xolair shots after #4 should plan 30 minute wait in clinic            Feb 23, 2018  4:00 PM CST   Nurse Only with ALLERGY [facility] ([facility])    [address] [address]   [phone]           Every allergy patient MUST wait 30 minutes after their allergy shot. No exceptions.  Xolair shots #1-3 should plan to wait 2 hours in clinic Xolair shots after #4 should plan 30 minute wait in clinic              Who to contact     If you have questions or need follow up information about today's clinic visit or your schedule please contact [facility] directly at [phone].  Normal or non-critical lab and imaging results will be communicated to you by MyChart, letter or phone within 4 business days after the clinic has received the results. If you do not hear from us within 7 days, please contact the clinic through MyChart or phone. If you have a critical or abnormal lab result, we will notify you by phone as soon as possible.  Submit refill requests through [employer] or call your pharmacy and they will forward the refill request to us. Please allow 3 business days for your refill to be completed.           "Additional Information About Your Visit        MyChart Information     [employer] lets you send messages to your doctor, view your test results, renew your prescriptions, schedule appointments and more. To sign up, go to [email or web address] . Click on \"Log in\" on the left side of the screen, which will take you to the Welcome page. Then click on \"Sign up Now\" on the right side of the page.     You will be asked to enter the access code listed below, as well as some personal information. Please follow the directions to create your username and password.     Your access code is: [email or web address]  Expires: 2018  4:40 PM     Your access code will  in 90 days. If you need help or a new code, please call your [place] clinic or [phone].        Care EveryWhere ID     This is your Care EveryWhere ID. This could be used by other organizations to access your [place] medical records  [id number]         Blood Pressure from Last 3 Encounters:   17 117/78   17 124/78   17 131/84    Weight from Last 3 Encounters:   17 77 kg (169 lb 12.1 oz)   17 76.6 kg (168 lb 14.4 oz)   17 77.4 kg (170 lb 10.2 oz)              We Performed the Following     Allergy Shot: Two or more injections        Primary Care Provider Office Phone # Fax #    [facility] [phone] [address] [address]        Equal Access to Services     EDUIN PENNINGTON : Hadii aurelio ku hadasho Soomaali, waaxda luqadaha, qaybta kaalmada adeegyada, josiah valentine. So [facility] [phone].    ATENCIÓN: Si habla español, tiene a spencer disposición servicios gratuitos de asistencia lingüística. Llame al [phone].    We comply with applicable federal civil rights laws and [place] laws. We do not discriminate on the basis of race, color, national origin, age, disability, sex, sexual orientation, or gender identity.            Thank you!     Thank you " for choosing [facility]  for your care. Our goal is always to provide you with excellent care. Hearing back from our patients is one way we can continue to improve our services. Please take a few minutes to complete the written survey that you may receive in the mail after your visit with us. Thank you!             Your Updated Medication List - Protect others around you: Learn how to safely use, store and throw away your medicines at www.disposemymeds.org.          This list is accurate as of 2/9/18  4:48 PM.  Always use your most recent med list.                   Brand Name Dispense Instructions for use Diagnosis    ADVIL 200 MG capsule   Generic drug:  ibuprofen      Take 200 mg by mouth every 4 hours as needed Reported on 4/28/2017        albuterol 108 (90 BASE) MCG/ACT Inhaler    PROAIR HFA/PROVENTIL HFA/VENTOLIN HFA     Inhale 2 puffs into the lungs every 4 hours as needed for shortness of breath / dyspnea or wheezing        * ALLERGEN IMMUNOTHERAPY PRESCRIPTION     5 mL    Name of Mix: Mix #1  Cat, Dog Cat Hair, Standardized 10,000 BAU/mL, ALK  2.0 ml Dog Hair Dander, A. P.  1:100 w/v, HS  1.0 ml  Diluent: HSA qs to 5ml    Seasonal allergic rhinitis due to pollen, Non-seasonal allergic rhinitis due to animal hair and dander, Chronic allergic conjunctivitis, Need for desensitization to allergens       * ALLERGEN IMMUNOTHERAPY PRESCRIPTION     5 mL    Name of Mix: Mix #2  Tree  Birch Mix GLY 1:20 w/v, HS  0.5 ml Boxelder-Maple  Mix BHR (Boxelder Hard Red) 1:20 w/v, HS  0.5 ml [place] Mix GLY 1:20 w/v, HS 0.5 ml Oak Mix RVW GLY 1:20 w/v, HS 0.5 ml [place] Tree, Black GLY 1:20 w/v, HS 0.5 ml [place], Black GLY 1:20 w/v, HS 0.5 ml Diluent: HSA qs to 5ml    Chronic seasonal allergic rhinitis due to pollen, Non-seasonal allergic rhinitis due to animal hair and dander, Chronic allergic conjunctivitis, Need for desensitization to allergens       EPINEPHrine 0.3 MG/0.3ML injection 2-pack    AUVI-Q    0.6  mL    Inject 0.3 mLs (0.3 mg) into the muscle as needed for anaphylaxis    Seasonal allergic rhinitis due to pollen, Non-seasonal allergic rhinitis due to animal hair and dander       fluticasone 50 MCG/ACT spray    FLONASE    1 Bottle    Spray 2 sprays into both nostrils daily    Atopic rhinitis       * Notice:  This list has 2 medication(s) that are the same as other medications prescribed for you. Read the directions carefully, and ask your doctor or other care provider to review them with you.

## 2018-02-16 ENCOUNTER — ALLIED HEALTH/NURSE VISIT (OUTPATIENT)
Dept: ALLERGY | Facility: CLINIC | Age: 28
End: 2018-02-16
Payer: COMMERCIAL

## 2018-02-16 DIAGNOSIS — J30.9 ALLERGIC RHINITIS: Primary | ICD-10-CM

## 2018-02-16 PROCEDURE — 99207 ZZC DROP WITH A PROCEDURE: CPT

## 2018-02-16 PROCEDURE — 95117 IMMUNOTHERAPY INJECTIONS: CPT

## 2018-02-23 ENCOUNTER — ALLIED HEALTH/NURSE VISIT (OUTPATIENT)
Dept: ALLERGY | Facility: CLINIC | Age: 28
End: 2018-02-23
Payer: COMMERCIAL

## 2018-02-23 DIAGNOSIS — J30.9 ALLERGIC RHINITIS: Primary | ICD-10-CM

## 2018-02-23 PROCEDURE — 95117 IMMUNOTHERAPY INJECTIONS: CPT

## 2018-02-23 PROCEDURE — 99207 ZZC DROP WITH A PROCEDURE: CPT

## 2018-02-23 NOTE — MR AVS SNAPSHOT
"              After Visit Summary   2018    Dmitri Mcguire    MRN: [id number]           Patient Information     Date Of Birth          1990        Visit Information        Provider Department      2018 4:00 PM [facility]ERGY [facility]        Today's Diagnoses     Allergic rhinitis    -  1       Follow-ups after your visit        Who to contact     If you have questions or need follow up information about today's clinic visit or your schedule please contact [facility] directly at [phone].  Normal or non-critical lab and imaging results will be communicated to you by MyChart, letter or phone within 4 business days after the clinic has received the results. If you do not hear from us within 7 days, please contact the clinic through [employer] or phone. If you have a critical or abnormal lab result, we will notify you by phone as soon as possible.  Submit refill requests through [employer] or call your pharmacy and they will forward the refill request to us. Please allow 3 business days for your refill to be completed.          Additional Information About Your Visit        MyChart Information     [employer] lets you send messages to your doctor, view your test results, renew your prescriptions, schedule appointments and more. To sign up, go to [email or web address] . Click on \"Log in\" on the left side of the screen, which will take you to the Welcome page. Then click on \"Sign up Now\" on the right side of the page.     You will be asked to enter the access code listed below, as well as some personal information. Please follow the directions to create your username and password.     Your access code is: [email or web address]  Expires: 2018  4:40 PM     Your access code will  in 90 days. If you need help or a new code, please call your [facility] or [phone].        Care EveryWhere ID     This is your Care EveryWhere ID. This could be used by other organizations to " access your [place] medical records  [id number]         Blood Pressure from Last 3 Encounters:   09/29/17 117/78   06/01/17 124/78   04/28/17 131/84    Weight from Last 3 Encounters:   09/29/17 77 kg (169 lb 12.1 oz)   06/01/17 76.6 kg (168 lb 14.4 oz)   04/21/17 77.4 kg (170 lb 10.2 oz)              We Performed the Following     Allergy Shot: Two or more injections        Primary Care Provider Office Phone # Fax #    [facility] [phone] [address] [facility] [address]        Equal Access to Services     EDUIN PENNINGTON : Hadii aurelio Lyle, wapaoda zan, qaybta kaalmada sebastián, josiah valentine. So [facility] [phone].    ATENCIÓN: Si habla español, tiene a spencer disposición servicios gratuitos de asistencia lingüística. Llame al [phone].    We comply with applicable federal civil rights laws and [place] laws. We do not discriminate on the basis of race, color, national origin, age, disability, sex, sexual orientation, or gender identity.            Thank you!     Thank you for choosing [facility]  for your care. Our goal is always to provide you with excellent care. Hearing back from our patients is one way we can continue to improve our services. Please take a few minutes to complete the written survey that you may receive in the mail after your visit with us. Thank you!             Your Updated Medication List - Protect others around you: Learn how to safely use, store and throw away your medicines at www.disposemymeds.org.          This list is accurate as of 2/23/18  4:42 PM.  Always use your most recent med list.                   Brand Name Dispense Instructions for use Diagnosis    ADVIL 200 MG capsule   Generic drug:  ibuprofen      Take 200 mg by mouth every 4 hours as needed Reported on 4/28/2017        albuterol 108 (90 BASE) MCG/ACT Inhaler    PROAIR HFA/PROVENTIL HFA/VENTOLIN HFA     Inhale 2  puffs into the lungs every 4 hours as needed for shortness of breath / dyspnea or wheezing        * ALLERGEN IMMUNOTHERAPY PRESCRIPTION     5 mL    Name of Mix: Mix #1  Cat, Dog Cat Hair, Standardized 10,000 BAU/mL, ALK  2.0 ml Dog Hair Dander, A. P.  1:100 w/v, HS  1.0 ml  Diluent: HSA qs to 5ml    Seasonal allergic rhinitis due to pollen, Non-seasonal allergic rhinitis due to animal hair and dander, Chronic allergic conjunctivitis, Need for desensitization to allergens       * ALLERGEN IMMUNOTHERAPY PRESCRIPTION     5 mL    Name of Mix: Mix #2  Tree  Birch Mix GLY 1:20 w/v, HS  0.5 ml Boxelder-Maple  Mix BHR (Boxelder Hard Red) 1:20 w/v, HS  0.5 ml [place] Mix GLY 1:20 w/v, HS 0.5 ml Oak Mix RVW GLY 1:20 w/v, HS 0.5 ml [place] Tree, Black GLY 1:20 w/v, HS 0.5 ml [place], Black GLY 1:20 w/v, HS 0.5 ml Diluent: HSA qs to 5ml    Chronic seasonal allergic rhinitis due to pollen, Non-seasonal allergic rhinitis due to animal hair and dander, Chronic allergic conjunctivitis, Need for desensitization to allergens       EPINEPHrine 0.3 MG/0.3ML injection 2-pack    AUVI-Q    0.6 mL    Inject 0.3 mLs (0.3 mg) into the muscle as needed for anaphylaxis    Seasonal allergic rhinitis due to pollen, Non-seasonal allergic rhinitis due to animal hair and dander       fluticasone 50 MCG/ACT spray    FLONASE    1 Bottle    Spray 2 sprays into both nostrils daily    Atopic rhinitis       * Notice:  This list has 2 medication(s) that are the same as other medications prescribed for you. Read the directions carefully, and ask your doctor or other care provider to review them with you.

## 2018-03-02 ENCOUNTER — ALLIED HEALTH/NURSE VISIT (OUTPATIENT)
Dept: ALLERGY | Facility: CLINIC | Age: 28
End: 2018-03-02
Payer: COMMERCIAL

## 2018-03-02 DIAGNOSIS — J30.9 ALLERGIC RHINITIS: Primary | ICD-10-CM

## 2018-03-02 PROCEDURE — 99207 ZZC DROP WITH A PROCEDURE: CPT

## 2018-03-02 PROCEDURE — 95117 IMMUNOTHERAPY INJECTIONS: CPT

## 2018-03-02 NOTE — MR AVS SNAPSHOT
"              After Visit Summary   3/2/2018    Dmitri Mcguire    MRN: [id number]           Patient Information     Date Of Birth          1990        Visit Information        Provider Department      3/2/2018 4:00 PM ALLERGY [facility] - [facility]        Today's Diagnoses     Allergic rhinitis    -  1       Follow-ups after your visit        Who to contact     If you have questions or need follow up information about today's clinic visit or your schedule please contact [facility] directly at [phone].  Normal or non-critical lab and imaging results will be communicated to you by MyChart, letter or phone within 4 business days after the clinic has received the results. If you do not hear from us within 7 days, please contact the clinic through OpenSynergyhart or phone. If you have a critical or abnormal lab result, we will notify you by phone as soon as possible.  Submit refill requests through [place]  or call your pharmacy and they will forward the refill request to us. Please allow 3 business days for your refill to be completed.          Additional Information About Your Visit        MyChart Information     [place]  lets you send messages to your doctor, view your test results, renew your prescriptions, schedule appointments and more. To sign up, go to [email or web address]  . Click on \"Log in\" on the left side of the screen, which will take you to the Welcome page. Then click on \"Sign up Now\" on the right side of the page.     You will be asked to enter the access code listed below, as well as some personal information. Please follow the directions to create your username and password.     Your access code is: [email or web address]  Expires: 2018  4:40 PM     Your access code will  in 90 days. If you need help or a new code, please call your [facility] or [phone].        Care EveryWhere ID     This is your Care EveryWhere ID. This could be used by other organizations to " access your [place] medical records  [id number]         Blood Pressure from Last 3 Encounters:   09/29/17 117/78   06/01/17 124/78   04/28/17 131/84    Weight from Last 3 Encounters:   09/29/17 77 kg (169 lb 12.1 oz)   06/01/17 76.6 kg (168 lb 14.4 oz)   04/21/17 77.4 kg (170 lb 10.2 oz)              We Performed the Following     Allergy Shot: Two or more injections        Primary Care Provider Office Phone # Fax #    [facility] [phone] [address] [facility] [address]        Equal Access to Services     EDUIN PENNINGTON : Hadii aurelio Lyle, wapaoda zan, qaybta kaalmada sebastián, josiah valentine. So [facility] [phone].    ATENCIÓN: Si habla español, tiene a spencer disposición servicios gratuitos de asistencia lingüística. Llame al [phone].    We comply with applicable federal civil rights laws and [place] laws. We do not discriminate on the basis of race, color, national origin, age, disability, sex, sexual orientation, or gender identity.            Thank you!     Thank you for choosing [facility]  for your care. Our goal is always to provide you with excellent care. Hearing back from our patients is one way we can continue to improve our services. Please take a few minutes to complete the written survey that you may receive in the mail after your visit with us. Thank you!             Your Updated Medication List - Protect others around you: Learn how to safely use, store and throw away your medicines at www.disposemymeds.org.          This list is accurate as of 3/2/18  5:08 PM.  Always use your most recent med list.                   Brand Name Dispense Instructions for use Diagnosis    ADVIL 200 MG capsule   Generic drug:  ibuprofen      Take 200 mg by mouth every 4 hours as needed Reported on 4/28/2017        albuterol 108 (90 BASE) MCG/ACT Inhaler    PROAIR HFA/PROVENTIL HFA/VENTOLIN HFA     Inhale 2  puffs into the lungs every 4 hours as needed for shortness of breath / dyspnea or wheezing        * ALLERGEN IMMUNOTHERAPY PRESCRIPTION     5 mL    Name of Mix: Mix #1  Cat, Dog Cat Hair, Standardized 10,000 BAU/mL, ALK  2.0 ml Dog Hair Dander, A. P.  1:100 w/v, HS  1.0 ml  Diluent: HSA qs to 5ml    Seasonal allergic rhinitis due to pollen, Non-seasonal allergic rhinitis due to animal hair and dander, Chronic allergic conjunctivitis, Need for desensitization to allergens       * ALLERGEN IMMUNOTHERAPY PRESCRIPTION     5 mL    Name of Mix: Mix #2  Tree  Birch Mix GLY 1:20 w/v, HS  0.5 ml Boxelder-Maple  Mix BHR (Boxelder Hard Red) 1:20 w/v, HS  0.5 ml [place] Mix GLY 1:20 w/v, HS 0.5 ml Oak Mix RVW GLY 1:20 w/v, HS 0.5 ml [place] Tree, Black GLY 1:20 w/v, HS 0.5 ml [place], Black GLY 1:20 w/v, HS 0.5 ml Diluent: HSA qs to 5ml    Chronic seasonal allergic rhinitis due to pollen, Non-seasonal allergic rhinitis due to animal hair and dander, Chronic allergic conjunctivitis, Need for desensitization to allergens       EPINEPHrine 0.3 MG/0.3ML injection 2-pack    AUVI-Q    0.6 mL    Inject 0.3 mLs (0.3 mg) into the muscle as needed for anaphylaxis    Seasonal allergic rhinitis due to pollen, Non-seasonal allergic rhinitis due to animal hair and dander       fluticasone 50 MCG/ACT spray    FLONASE    1 Bottle    Spray 2 sprays into both nostrils daily    Atopic rhinitis       * Notice:  This list has 2 medication(s) that are the same as other medications prescribed for you. Read the directions carefully, and ask your doctor or other care provider to review them with you.

## 2018-03-09 ENCOUNTER — ALLIED HEALTH/NURSE VISIT (OUTPATIENT)
Dept: ALLERGY | Facility: CLINIC | Age: 28
End: 2018-03-09
Payer: COMMERCIAL

## 2018-03-09 ENCOUNTER — TELEPHONE (OUTPATIENT)
Dept: ALLERGY | Facility: CLINIC | Age: 28
End: 2018-03-09

## 2018-03-09 DIAGNOSIS — J30.81 NON-SEASONAL ALLERGIC RHINITIS DUE TO ANIMAL HAIR AND DANDER: ICD-10-CM

## 2018-03-09 DIAGNOSIS — H10.45 CHRONIC ALLERGIC CONJUNCTIVITIS: ICD-10-CM

## 2018-03-09 DIAGNOSIS — Z51.6 NEED FOR DESENSITIZATION TO ALLERGENS: ICD-10-CM

## 2018-03-09 DIAGNOSIS — J30.9 ALLERGIC RHINITIS: Primary | ICD-10-CM

## 2018-03-09 PROCEDURE — 95117 IMMUNOTHERAPY INJECTIONS: CPT

## 2018-03-09 PROCEDURE — 99207 ZZC DROP WITH A PROCEDURE: CPT

## 2018-03-09 NOTE — MR AVS SNAPSHOT
After Visit Summary   3/9/2018    Dmitri Mcguire    MRN: [id number]           Patient Information     Date Of Birth          1990        Visit Information        Provider Department      3/9/2018 4:15 PM ALLERGY [facility]        Today's Diagnoses     Allergic rhinitis    -  1       Follow-ups after your visit        Your next 10 appointments already scheduled     Mar 16, 2018  8:45 AM CDT   Nurse Only with ALLERGY [facility] ([facility])    [address] [address]   [phone]           Every allergy patient MUST wait 30 minutes after their allergy shot. No exceptions.  Xolair shots #1-3 should plan to wait 2 hours in clinic Xolair shots after #4 should plan 30 minute wait in clinic            Mar 23, 2018  4:15 PM CDT   Nurse Only with ALLERGY [facility] ([facility])    [address] [address]   [phone]           Every allergy patient MUST wait 30 minutes after their allergy shot. No exceptions.  Xolair shots #1-3 should plan to wait 2 hours in clinic Xolair shots after #4 should plan 30 minute wait in clinic            Mar 30, 2018  4:15 PM CDT   Nurse Only with ALLERGY [facility] ([facility])    [address] [address]   [phone]           Every allergy patient MUST wait 30 minutes after their allergy shot. No exceptions.  Xolair shots #1-3 should plan to wait 2 hours in clinic Xolair shots after #4 should plan 30 minute wait in clinic            Apr 06, 2018  4:15 PM CDT   Nurse Only with ALLERGY [facility] ([facility])    [address] [facility] [place] [address]   [phone]           Every allergy patient MUST wait 30 minutes after their allergy shot. No exceptions.  Xolair shots #1-3 should plan to wait 2 hours  "in clinic Xolair shots after #4 should plan 30 minute wait in clinic            2018  4:15 PM CDT   Nurse Only with ALLERGY [facility] ([facility])    [address] [facility] [address] [phone]           Every allergy patient MUST wait 30 minutes after their allergy shot. No exceptions.  Xolair shots #1-3 should plan to wait 2 hours in clinic Xolair shots after #4 should plan 30 minute wait in clinic              Who to contact     If you have questions or need follow up information about today's clinic visit or your schedule please contact [facility] directly at [phone].  Normal or non-critical lab and imaging results will be communicated to you by DirectMoneyhart, letter or phone within 4 business days after the clinic has received the results. If you do not hear from us within 7 days, please contact the clinic through DirectMoneyhart or phone. If you have a critical or abnormal lab result, we will notify you by phone as soon as possible.  Submit refill requests through [facility] or call your pharmacy and they will forward the refill request to us. Please allow 3 business days for your refill to be completed.          Additional Information About Your Visit        AzulStar Information     [facility] lets you send messages to your doctor, view your test results, renew your prescriptions, schedule appointments and more. To sign up, go to [email or web address] . Click on \"Log in\" on the left side of the screen, which will take you to the Welcome page. Then click on \"Sign up Now\" on the right side of the page.     You will be asked to enter the access code listed below, as well as some personal information. Please follow the directions to create your username and password.     Your access code is: [email or web address]  Expires: 2018  4:40 PM     Your access code will  in 90 days. If you need help or a new code, please call your [facility] or " [phone].        Care EveryWhere ID     This is your Care EveryWhere ID. This could be used by other organizations to access your [place] medical records  [id number]         Blood Pressure from Last 3 Encounters:   09/29/17 117/78   06/01/17 124/78   04/28/17 131/84    Weight from Last 3 Encounters:   09/29/17 77 kg (169 lb 12.1 oz)   06/01/17 76.6 kg (168 lb 14.4 oz)   04/21/17 77.4 kg (170 lb 10.2 oz)              We Performed the Following     Allergy Shot: Two or more injections        Primary Care Provider Office Phone # Fax #    [facility] [phone] [address] [address]        Equal Access to Services     EDUIN PENNINGTON : Hadii aurelio stonero Sonikko, waaxda luqadaha, qaybta kaalmada adeegyada, josiah rahman . So [facility] [phone].    ATENCIÓN: Si habla español, tiene a spencer disposición servicios gratuitos de asistencia lingüística. Llame al [phone].    We comply with applicable federal civil rights laws and [place] laws. We do not discriminate on the basis of race, color, national origin, age, disability, sex, sexual orientation, or gender identity.            Thank you!     Thank you for choosing [facility]  for your care. Our goal is always to provide you with excellent care. Hearing back from our patients is one way we can continue to improve our services. Please take a few minutes to complete the written survey that you may receive in the mail after your visit with us. Thank you!             Your Updated Medication List - Protect others around you: Learn how to safely use, store and throw away your medicines at www.disposemymeds.org.          This list is accurate as of 3/9/18  4:52 PM.  Always use your most recent med list.                   Brand Name Dispense Instructions for use Diagnosis    ADVIL 200 MG capsule   Generic drug:  ibuprofen      Take 200 mg by mouth every 4 hours as needed  Reported on 4/28/2017        albuterol 108 (90 BASE) MCG/ACT Inhaler    PROAIR HFA/PROVENTIL HFA/VENTOLIN HFA     Inhale 2 puffs into the lungs every 4 hours as needed for shortness of breath / dyspnea or wheezing        * ALLERGEN IMMUNOTHERAPY PRESCRIPTION     5 mL    Name of Mix: Mix #1  Cat, Dog Cat Hair, Standardized 10,000 BAU/mL, ALK  2.0 ml Dog Hair Dander, A. P.  1:100 w/v, HS  1.0 ml  Diluent: HSA qs to 5ml    Seasonal allergic rhinitis due to pollen, Non-seasonal allergic rhinitis due to animal hair and dander, Chronic allergic conjunctivitis, Need for desensitization to allergens       * ALLERGEN IMMUNOTHERAPY PRESCRIPTION     5 mL    Name of Mix: Mix #2  Tree  Birch Mix GLY 1:20 w/v, HS  0.5 ml Boxelder-Maple  Mix BHR (Boxelder Hard Red) 1:20 w/v, HS  0.5 ml [place] Mix GLY 1:20 w/v, HS 0.5 ml Oak Mix RVW GLY 1:20 w/v, HS 0.5 ml [place] Tree, Black GLY 1:20 w/v, HS 0.5 ml [place], Black GLY 1:20 w/v, HS 0.5 ml Diluent: HSA qs to 5ml    Chronic seasonal allergic rhinitis due to pollen, Non-seasonal allergic rhinitis due to animal hair and dander, Chronic allergic conjunctivitis, Need for desensitization to allergens       EPINEPHrine 0.3 MG/0.3ML injection 2-pack    AUVI-Q    0.6 mL    Inject 0.3 mLs (0.3 mg) into the muscle as needed for anaphylaxis    Seasonal allergic rhinitis due to pollen, Non-seasonal allergic rhinitis due to animal hair and dander       fluticasone 50 MCG/ACT spray    FLONASE    1 Bottle    Spray 2 sprays into both nostrils daily    Atopic rhinitis       * Notice:  This list has 2 medication(s) that are the same as other medications prescribed for you. Read the directions carefully, and ask your doctor or other care provider to review them with you.

## 2018-03-09 NOTE — TELEPHONE ENCOUNTER
Pt reached top dose x 1 for C/D today and needs new vial. He has one dose left. Please advise how to saticfy top dose x 4.  [place] / NEELIMA

## 2018-03-09 NOTE — TELEPHONE ENCOUNTER
ALLERGY SOLUTION RE-ORDER REQUEST    Dmitri Mcguire 1990 MRN: [id number]    DATE NEEDED:  ASAP  Vial Color Content   Top Dose   Last Dose Vial Size  Red 1:1 Cat, Dog   Red 1:1 0.5   Red 1:10.5 5mL      Shot Clinic Location:  [place]  Ship to Location: [place]  Special Instructions:  ASAP pt is working on top dose x 4      Updated Prescription Needed: No      Requester Signature  [place]

## 2018-03-14 DIAGNOSIS — J30.2 CHRONIC SEASONAL ALLERGIC RHINITIS DUE TO OTHER ALLERGEN: Primary | ICD-10-CM

## 2018-03-14 PROCEDURE — 95165 ANTIGEN THERAPY SERVICES: CPT | Performed by: ALLERGY & IMMUNOLOGY

## 2018-03-14 NOTE — PROGRESS NOTES
Allergy serums billed at [place].     Vials received below:      Vial Color Content                      Vial Size Expiration Date  Red 1:1 Cat, Dog 5mL  3/13/2019    Original Refill encounter date: 3/9/18      Signature  Mary Paul RN

## 2018-03-14 NOTE — TELEPHONE ENCOUNTER
March 14, 2018    Allergy serums received at [place].     Vials received below:    Vial Color Content                      Vial Size Expiration Date  Red 1:1 Cat, Dog 5mL  3/13/2019      Signature  Mary Paul RN

## 2018-03-16 ENCOUNTER — ALLIED HEALTH/NURSE VISIT (OUTPATIENT)
Dept: ALLERGY | Facility: CLINIC | Age: 28
End: 2018-03-16
Payer: COMMERCIAL

## 2018-03-16 DIAGNOSIS — J30.9 ALLERGIC RHINITIS: Primary | ICD-10-CM

## 2018-03-16 PROCEDURE — 99207 ZZC DROP WITH A PROCEDURE: CPT

## 2018-03-16 PROCEDURE — 95117 IMMUNOTHERAPY INJECTIONS: CPT

## 2018-03-16 NOTE — MR AVS SNAPSHOT
After Visit Summary   3/16/2018    Dmitri Mcguire    MRN: [id number]           Patient Information     Date Of Birth          1990        Visit Information        Provider Department      3/16/2018 8:45 AM ALLERGY [facility]        Today's Diagnoses     Allergic rhinitis    -  1       Follow-ups after your visit        Your next 10 appointments already scheduled     Mar 23, 2018  4:15 PM CDT   Nurse Only with ALLERGY [facility] ([facility])    [address] [address]   [phone]           Every allergy patient MUST wait 30 minutes after their allergy shot. No exceptions.  Xolair shots #1-3 should plan to wait 2 hours in clinic Xolair shots after #4 should plan 30 minute wait in clinic            Mar 30, 2018  4:15 PM CDT   Nurse Only with ALLERGY [facility] ([facility])    [address] [address]   [phone]           Every allergy patient MUST wait 30 minutes after their allergy shot. No exceptions.  Xolair shots #1-3 should plan to wait 2 hours in clinic Xolair shots after #4 should plan 30 minute wait in clinic            Apr 06, 2018  4:15 PM CDT   Nurse Only with ALLERGY [facility] ([facility])    [address] [address]   [phone]           Every allergy patient MUST wait 30 minutes after their allergy shot. No exceptions.  Xolair shots #1-3 should plan to wait 2 hours in clinic Xolair shots after #4 should plan 30 minute wait in clinic            Apr 13, 2018  4:15 PM CDT   Nurse Only with ALLERGY [facility] ([facility])    [address] [place] [address]   [phone]           Every allergy patient MUST wait 30 minutes after their allergy shot. No exceptions.  Xolair shots #1-3 should plan to wait 2  "hours in clinic Xolair shots after #4 should plan 30 minute wait in clinic              Who to contact     If you have questions or need follow up information about today's clinic visit or your schedule please contact [facility] directly at [phone].  Normal or non-critical lab and imaging results will be communicated to you by MyChart, letter or phone within 4 business days after the clinic has received the results. If you do not hear from us within 7 days, please contact the clinic through MyChart or phone. If you have a critical or abnormal lab result, we will notify you by phone as soon as possible.  Submit refill requests through [facility] or call your pharmacy and they will forward the refill request to us. Please allow 3 business days for your refill to be completed.          Additional Information About Your Visit        MyChart Information     [facility] lets you send messages to your doctor, view your test results, renew your prescriptions, schedule appointments and more. To sign up, go to [email or web address] . Click on \"Log in\" on the left side of the screen, which will take you to the Welcome page. Then click on \"Sign up Now\" on the right side of the page.     You will be asked to enter the access code listed below, as well as some personal information. Please follow the directions to create your username and password.     Your access code is: [email or web address]  Expires: 2018  5:40 PM     Your access code will  in 90 days. If you need help or a new code, please call your [place] clinic or [phone].        Care EveryWhere ID     This is your Care EveryWhere ID. This could be used by other organizations to access your [place] medical records  [id number]         Blood Pressure from Last 3 Encounters:   17 117/78   17 124/78   17 131/84    Weight from Last 3 Encounters:   17 77 kg (169 lb 12.1 oz)   17 76.6 kg (168 lb 14.4 oz)   17 77.4 kg " (170 lb 10.2 oz)              We Performed the Following     Allergy Shot: Two or more injections        Primary Care Provider Office Phone # Fax #    [facility] [phone] [address]  [facility] [address]        Equal Access to Services     ROLANDMILVIA ADITI AH: Hadii aad ku hadasho Soomaali, waaxda luqadaha, qaybta kaalmada adeegyada, waxay idiin hayaan adeeg leelee la'aan ah. So [facility] [phone].    ATENCIÓN: Si habla español, tiene a spencer disposición servicios gratuitos de asistencia lingüística. Llame al [phone].    We comply with applicable federal civil rights laws and [place] laws. We do not discriminate on the basis of race, color, national origin, age, disability, sex, sexual orientation, or gender identity.            Thank you!     Thank you for choosing [facility]  for your care. Our goal is always to provide you with excellent care. Hearing back from our patients is one way we can continue to improve our services. Please take a few minutes to complete the written survey that you may receive in the mail after your visit with us. Thank you!             Your Updated Medication List - Protect others around you: Learn how to safely use, store and throw away your medicines at www.disposemymeds.org.          This list is accurate as of 3/16/18  9:46 AM.  Always use your most recent med list.                   Brand Name Dispense Instructions for use Diagnosis    ADVIL 200 MG capsule   Generic drug:  ibuprofen      Take 200 mg by mouth every 4 hours as needed Reported on 4/28/2017        albuterol 108 (90 BASE) MCG/ACT Inhaler    PROAIR HFA/PROVENTIL HFA/VENTOLIN HFA     Inhale 2 puffs into the lungs every 4 hours as needed for shortness of breath / dyspnea or wheezing        * ALLERGEN IMMUNOTHERAPY PRESCRIPTION     5 mL    Name of Mix: Mix #2  Tree  Birch Mix GLY 1:20 w/v, HS  0.5 ml Boxelder-Maple  Mix BHR (Boxelder Hard Red) 1:20 w/v, HS  0.5 ml  [place] Mix GLY 1:20 w/v, HS 0.5 ml Oak Mix RVW GLY 1:20 w/v, HS 0.5 ml [place] Tree, Black GLY 1:20 w/v, HS 0.5 ml [place], Black GLY 1:20 w/v, HS 0.5 ml Diluent: HSA qs to 5ml    Chronic seasonal allergic rhinitis due to pollen, Non-seasonal allergic rhinitis due to animal hair and dander, Chronic allergic conjunctivitis, Need for desensitization to allergens       * ALLERGEN IMMUNOTHERAPY PRESCRIPTION     5 mL    Name of Mix: Mix #1  Cat, Dog Cat Hair, Standardized 10,000 BAU/mL, ALK  2.0 ml Dog Hair Dander, A. P.  1:100 w/v, HS  1.0 ml  Diluent: HSA qs to 5ml    Non-seasonal allergic rhinitis due to animal hair and dander, Chronic allergic conjunctivitis, Need for desensitization to allergens       EPINEPHrine 0.3 MG/0.3ML injection 2-pack    AUVI-Q    0.6 mL    Inject 0.3 mLs (0.3 mg) into the muscle as needed for anaphylaxis    Seasonal allergic rhinitis due to pollen, Non-seasonal allergic rhinitis due to animal hair and dander       fluticasone 50 MCG/ACT spray    FLONASE    1 Bottle    Spray 2 sprays into both nostrils daily    Atopic rhinitis       * Notice:  This list has 2 medication(s) that are the same as other medications prescribed for you. Read the directions carefully, and ask your doctor or other care provider to review them with you.

## 2018-03-23 ENCOUNTER — ALLIED HEALTH/NURSE VISIT (OUTPATIENT)
Dept: ALLERGY | Facility: CLINIC | Age: 28
End: 2018-03-23
Payer: COMMERCIAL

## 2018-03-23 DIAGNOSIS — J30.9 ALLERGIC RHINITIS: Primary | ICD-10-CM

## 2018-03-23 PROCEDURE — 99207 ZZC DROP WITH A PROCEDURE: CPT

## 2018-03-23 PROCEDURE — 95117 IMMUNOTHERAPY INJECTIONS: CPT

## 2018-03-23 NOTE — MR AVS SNAPSHOT
After Visit Summary   3/23/2018    Dmitri Mcguire    MRN: [id number]           Patient Information     Date Of Birth          1990        Visit Information        Provider Department      3/23/2018 4:15 PM ALLERGY [facility]        Today's Diagnoses     Allergic rhinitis    -  1       Follow-ups after your visit        Your next 10 appointments already scheduled     Mar 30, 2018  4:15 PM CDT   Nurse Only with ALLERGY [facility] ([facility])    [address] [address]   [phone]           Every allergy patient MUST wait 30 minutes after their allergy shot. No exceptions.  Xolair shots #1-3 should plan to wait 2 hours in clinic Xolair shots after #4 should plan 30 minute wait in clinic            Apr 06, 2018  4:15 PM CDT   Nurse Only with ALLERGY [facility] ([facility])    [address] [address]   [phone]           Every allergy patient MUST wait 30 minutes after their allergy shot. No exceptions.  Xolair shots #1-3 should plan to wait 2 hours in clinic Xolair shots after #4 should plan 30 minute wait in clinic            Apr 13, 2018  4:15 PM CDT   Nurse Only with ALLERGY [facility] ([facility])    [address] [address]   [phone]           Every allergy patient MUST wait 30 minutes after their allergy shot. No exceptions.  Xolair shots #1-3 should plan to wait 2 hours in clinic Xolair shots after #4 should plan 30 minute wait in clinic              Who to contact     If you have questions or need follow up information about today's clinic visit or your schedule please contact [facility] directly at [phone].  Normal or non-critical lab and imaging results will be communicated to you by MyChart, letter or phone within 4 business days after the  "clinic has received the results. If you do not hear from us within 7 days, please contact the clinic through Fleep or phone. If you have a critical or abnormal lab result, we will notify you by phone as soon as possible.  Submit refill requests through Fleep or call your pharmacy and they will forward the refill request to us. Please allow 3 business days for your refill to be completed.          Additional Information About Your Visit        MusiCaresharInkvite Information     Fleep lets you send messages to your doctor, view your test results, renew your prescriptions, schedule appointments and more. To sign up, go to [email or web address] . Click on \"Log in\" on the left side of the screen, which will take you to the Welcome page. Then click on \"Sign up Now\" on the right side of the page.     You will be asked to enter the access code listed below, as well as some personal information. Please follow the directions to create your username and password.     Your access code is: [email or web address]  Expires: 2018  5:40 PM     Your access code will  in 90 days. If you need help or a new code, please call your [place] clinic or [phone].        Care EveryWhere ID     This is your Care EveryWhere ID. This could be used by other organizations to access your [place] medical records  [id number]         Blood Pressure from Last 3 Encounters:   17 117/78   17 124/78   17 131/84    Weight from Last 3 Encounters:   17 77 kg (169 lb 12.1 oz)   17 76.6 kg (168 lb 14.4 oz)   17 77.4 kg (170 lb 10.2 oz)              We Performed the Following     Allergy Shot: Two or more injections        Primary Care Provider Office Phone # Fax #    [facility] [phone] [address] [facility] [address]        Equal Access to Services     EDUIN PENNINGTON AH: Elsie Lyle, waaxda luqadaha, qaybta kaalmazita lowery, josiah collins " nirmarco a storeyMichelleaan ah. So [facility] [phone].    ATENCIÓN: Si rolando jasso, tiene a spencer disposición servicios gratuitos de asistencia lingüística. Mushtaq al [phone].    We comply with applicable federal civil rights laws and [place] laws. We do not discriminate on the basis of race, color, national origin, age, disability, sex, sexual orientation, or gender identity.            Thank you!     Thank you for choosing [facility]  for your care. Our goal is always to provide you with excellent care. Hearing back from our patients is one way we can continue to improve our services. Please take a few minutes to complete the written survey that you may receive in the mail after your visit with us. Thank you!             Your Updated Medication List - Protect others around you: Learn how to safely use, store and throw away your medicines at www.disposemymeds.org.          This list is accurate as of 3/23/18  4:51 PM.  Always use your most recent med list.                   Brand Name Dispense Instructions for use Diagnosis    ADVIL 200 MG capsule   Generic drug:  ibuprofen      Take 200 mg by mouth every 4 hours as needed Reported on 4/28/2017        albuterol 108 (90 BASE) MCG/ACT Inhaler    PROAIR HFA/PROVENTIL HFA/VENTOLIN HFA     Inhale 2 puffs into the lungs every 4 hours as needed for shortness of breath / dyspnea or wheezing        * ALLERGEN IMMUNOTHERAPY PRESCRIPTION     5 mL    Name of Mix: Mix #2  Tree  Birch Mix GLY 1:20 w/v, HS  0.5 ml Boxelder-Maple  Mix BHR (Boxelder Hard Red) 1:20 w/v, HS  0.5 ml [place] Mix GLY 1:20 w/v, HS 0.5 ml Oak Mix RVW GLY 1:20 w/v, HS 0.5 ml [place] Tree, Black GLY 1:20 w/v, HS 0.5 ml [place], Black GLY 1:20 w/v, HS 0.5 ml Diluent: HSA qs to 5ml    Chronic seasonal allergic rhinitis due to pollen, Non-seasonal allergic rhinitis due to animal hair and dander, Chronic allergic conjunctivitis, Need for desensitization to allergens       * ALLERGEN IMMUNOTHERAPY  PRESCRIPTION     5 mL    Name of Mix: Mix #1  Cat, Dog Cat Hair, Standardized 10,000 BAU/mL, ALK  2.0 ml Dog Hair Dander, A. P.  1:100 w/v, HS  1.0 ml  Diluent: HSA qs to 5ml    Non-seasonal allergic rhinitis due to animal hair and dander, Chronic allergic conjunctivitis, Need for desensitization to allergens       EPINEPHrine 0.3 MG/0.3ML injection 2-pack    AUVI-Q    0.6 mL    Inject 0.3 mLs (0.3 mg) into the muscle as needed for anaphylaxis    Seasonal allergic rhinitis due to pollen, Non-seasonal allergic rhinitis due to animal hair and dander       fluticasone 50 MCG/ACT spray    FLONASE    1 Bottle    Spray 2 sprays into both nostrils daily    Atopic rhinitis       * Notice:  This list has 2 medication(s) that are the same as other medications prescribed for you. Read the directions carefully, and ask your doctor or other care provider to review them with you.

## 2018-03-30 ENCOUNTER — ALLIED HEALTH/NURSE VISIT (OUTPATIENT)
Dept: ALLERGY | Facility: CLINIC | Age: 28
End: 2018-03-30
Payer: COMMERCIAL

## 2018-03-30 DIAGNOSIS — J30.9 ALLERGIC RHINITIS: Primary | ICD-10-CM

## 2018-03-30 PROCEDURE — 95117 IMMUNOTHERAPY INJECTIONS: CPT

## 2018-03-30 PROCEDURE — 99207 ZZC DROP WITH A PROCEDURE: CPT

## 2018-03-30 NOTE — MR AVS SNAPSHOT
After Visit Summary   3/30/2018    Dmitri Mcguire    MRN: [id number]           Patient Information     Date Of Birth          1990        Visit Information        Provider Department      3/30/2018 4:15 PM ALLERGY [facility]        Today's Diagnoses     Allergic rhinitis    -  1       Follow-ups after your visit        Your next 10 appointments already scheduled     Apr 02, 2018  4:00 PM CDT   Nurse Only with ALLERGY [facility] ([facility])    [address] [address]   [phone]           Every allergy patient MUST wait 30 minutes after their allergy shot. No exceptions.  Xolair shots #1-3 should plan to wait 2 hours in clinic Xolair shots after #4 should plan 30 minute wait in clinic            Apr 02, 2018  4:20 PM CDT   Return Visit with Carlo Alegre MD   [facility]    [address] [address]   [phone]            Apr 06, 2018  4:15 PM CDT   Nurse Only with ALLERGY [facility] ([facility])    [address] [address]   [phone]           Every allergy patient MUST wait 30 minutes after their allergy shot. No exceptions.  Xolair shots #1-3 should plan to wait 2 hours in clinic Xolair shots after #4 should plan 30 minute wait in clinic            Apr 13, 2018  4:15 PM CDT   Nurse Only with ALLERGY [facility] ([facility])    [address] [address]   [phone]           Every allergy patient MUST wait 30 minutes after their allergy shot. No exceptions.  Xolair shots #1-3 should plan to wait 2 hours in clinic Xolair shots after #4 should plan 30 minute wait in clinic              Who to contact     If you have questions or need follow up information about today's clinic visit or your schedule please  "contact [facility] directly at [phone].  Normal or non-critical lab and imaging results will be communicated to you by MyChart, letter or phone within 4 business days after the clinic has received the results. If you do not hear from us within 7 days, please contact the clinic through MyChart or phone. If you have a critical or abnormal lab result, we will notify you by phone as soon as possible.  Submit refill requests through Afoundria or call your pharmacy and they will forward the refill request to us. Please allow 3 business days for your refill to be completed.          Additional Information About Your Visit        AirWatchharCELtrak Information     Afoundria lets you send messages to your doctor, view your test results, renew your prescriptions, schedule appointments and more. To sign up, go to [email or web address] . Click on \"Log in\" on the left side of the screen, which will take you to the Welcome page. Then click on \"Sign up Now\" on the right side of the page.     You will be asked to enter the access code listed below, as well as some personal information. Please follow the directions to create your username and password.     Your access code is: [email or web address]  Expires: 2018  5:40 PM     Your access code will  in 90 days. If you need help or a new code, please call your [place] clinic or [phone].        Care EveryWhere ID     This is your Care EveryWhere ID. This could be used by other organizations to access your [place] medical records  [id number]         Blood Pressure from Last 3 Encounters:   17 117/78   17 124/78   17 131/84    Weight from Last 3 Encounters:   17 77 kg (169 lb 12.1 oz)   17 76.6 kg (168 lb 14.4 oz)   17 77.4 kg (170 lb 10.2 oz)              We Performed the Following     Allergy Shot: Two or more injections        Primary Care Provider Office Phone # Fax #    [facility] [phone]       " [address] [address] [facility] [address]        Equal Access to Services     EDUIN PENNINGTON : Hadii aurelio ku hadlily Soxeniaali, waaxda luqadaha, qaybta kajose alfredoda juliajwzita, josiah umanzor yolyyuko rayoosirismarco a valentine. So [facility] [phone].    ATENCIÓN: Si habla español, tiene a spencer disposición servicios gratuitos de asistencia lingüística. Llame al [phone].    We comply with applicable federal civil rights laws and [place] laws. We do not discriminate on the basis of race, color, national origin, age, disability, sex, sexual orientation, or gender identity.            Thank you!     Thank you for choosing [facility]  for your care. Our goal is always to provide you with excellent care. Hearing back from our patients is one way we can continue to improve our services. Please take a few minutes to complete the written survey that you may receive in the mail after your visit with us. Thank you!             Your Updated Medication List - Protect others around you: Learn how to safely use, store and throw away your medicines at www.disposemymeds.org.          This list is accurate as of 3/30/18  4:37 PM.  Always use your most recent med list.                   Brand Name Dispense Instructions for use Diagnosis    ADVIL 200 MG capsule   Generic drug:  ibuprofen      Take 200 mg by mouth every 4 hours as needed Reported on 4/28/2017        albuterol 108 (90 BASE) MCG/ACT Inhaler    PROAIR HFA/PROVENTIL HFA/VENTOLIN HFA     Inhale 2 puffs into the lungs every 4 hours as needed for shortness of breath / dyspnea or wheezing        * ALLERGEN IMMUNOTHERAPY PRESCRIPTION     5 mL    Name of Mix: Mix #2  Tree  Birch Mix GLY 1:20 w/v, HS  0.5 ml Boxelder-Maple  Mix BHR (Boxelder Hard Red) 1:20 w/v, HS  0.5 ml [place] Mix GLY 1:20 w/v, HS 0.5 ml Oak Mix RVW GLY 1:20 w/v, HS 0.5 ml [place] Tree, Black GLY 1:20 w/v, HS 0.5 ml [place], Black GLY 1:20 w/v, HS 0.5 ml Diluent: HSA qs to 5ml    Chronic seasonal allergic  rhinitis due to pollen, Non-seasonal allergic rhinitis due to animal hair and dander, Chronic allergic conjunctivitis, Need for desensitization to allergens       * ALLERGEN IMMUNOTHERAPY PRESCRIPTION     5 mL    Name of Mix: Mix #1  Cat, Dog Cat Hair, Standardized 10,000 BAU/mL, ALK  2.0 ml Dog Hair Dander, A. P.  1:100 w/v, HS  1.0 ml  Diluent: HSA qs to 5ml    Non-seasonal allergic rhinitis due to animal hair and dander, Chronic allergic conjunctivitis, Need for desensitization to allergens       EPINEPHrine 0.3 MG/0.3ML injection 2-pack    AUVI-Q    0.6 mL    Inject 0.3 mLs (0.3 mg) into the muscle as needed for anaphylaxis    Seasonal allergic rhinitis due to pollen, Non-seasonal allergic rhinitis due to animal hair and dander       fluticasone 50 MCG/ACT spray    FLONASE    1 Bottle    Spray 2 sprays into both nostrils daily    Atopic rhinitis       * Notice:  This list has 2 medication(s) that are the same as other medications prescribed for you. Read the directions carefully, and ask your doctor or other care provider to review them with you.

## 2018-04-02 ENCOUNTER — ALLIED HEALTH/NURSE VISIT (OUTPATIENT)
Dept: ALLERGY | Facility: CLINIC | Age: 28
End: 2018-04-02
Payer: COMMERCIAL

## 2018-04-02 ENCOUNTER — OFFICE VISIT (OUTPATIENT)
Dept: ALLERGY | Facility: CLINIC | Age: 28
End: 2018-04-02
Payer: COMMERCIAL

## 2018-04-02 VITALS
TEMPERATURE: 98.2 F | WEIGHT: 171.08 LBS | HEIGHT: 67 IN | SYSTOLIC BLOOD PRESSURE: 138 MMHG | RESPIRATION RATE: 16 BRPM | OXYGEN SATURATION: 99 % | DIASTOLIC BLOOD PRESSURE: 87 MMHG | HEART RATE: 69 BPM | BODY MASS INDEX: 26.85 KG/M2

## 2018-04-02 DIAGNOSIS — J30.1 CHRONIC SEASONAL ALLERGIC RHINITIS DUE TO POLLEN: Primary | ICD-10-CM

## 2018-04-02 DIAGNOSIS — J30.81 NON-SEASONAL ALLERGIC RHINITIS DUE TO ANIMAL HAIR AND DANDER: ICD-10-CM

## 2018-04-02 DIAGNOSIS — J30.9 ALLERGIC RHINITIS: Primary | ICD-10-CM

## 2018-04-02 DIAGNOSIS — H10.45 CHRONIC ALLERGIC CONJUNCTIVITIS: ICD-10-CM

## 2018-04-02 DIAGNOSIS — J45.20 INTERMITTENT ASTHMA, UNCOMPLICATED: ICD-10-CM

## 2018-04-02 PROCEDURE — 99207 ZZC DROP WITH A PROCEDURE: CPT

## 2018-04-02 PROCEDURE — 99214 OFFICE O/P EST MOD 30 MIN: CPT | Performed by: ALLERGY & IMMUNOLOGY

## 2018-04-02 PROCEDURE — 95115 IMMUNOTHERAPY ONE INJECTION: CPT

## 2018-04-02 ASSESSMENT — ENCOUNTER SYMPTOMS
CHEST TIGHTNESS: 0
DIARRHEA: 0
JOINT SWELLING: 0
VOMITING: 0
FEVER: 0
FACIAL SWELLING: 0
ARTHRALGIAS: 0
EYE ITCHING: 1
HEADACHES: 0
SINUS PRESSURE: 0
EYE REDNESS: 0
ADENOPATHY: 0
FATIGUE: 0
WHEEZING: 0
COUGH: 0
NAUSEA: 0
ACTIVITY CHANGE: 1
SHORTNESS OF BREATH: 0
EYE DISCHARGE: 0
MYALGIAS: 0
RHINORRHEA: 0

## 2018-04-02 NOTE — PROGRESS NOTES
SUBJECTIVE:                                                                 Dmitri Mcguire presents today to our Allergy Clinic at [facility] for a follow up visit.  As you know, he is a 28 year old male with allergic rhinoconjunctivitis and asthma.    Percutaneous skin puncture testing for aeroallergens performed on April 28, 2017 showed sensitivity for dog, cat, rabbit and tree pollen (Birch mix, [place], Oak, [place], and Black [place]). Borderline sensitivity for [place] noted.   On allergen immunotherapy. Cat/dog since 6/30/2017. Trees since 9/22/17.          Allergy Immunotherapy  Date/time of injection(s): 4/2/18     Vial Color Content  Dose   Red 1:1 Cat, Dog  0.5 (Given 3/30/18-not due today)   Red 1:1 Trees  0.25     He tolerates injections well without persistent large local reactions or systemic reactions.  He doesn't use intranasal steroids. Doesn't take antihistamines regularly.    The patient denies clear rhinorrhea, nasal itch, stuffiness, sneezing, itchy/watery eyes or interval sinusitis symptoms of fever, facial pain or purulent rhinorrhea.  The father of his fiancée is bringing dogs.  In the past, he was not able to stay in that housefor more than several minutes.  Nowadays, he has very mild symptoms when he is at that home, that has 1 indoor dog.  He has more persistent and severe symptoms when he is around multiple dogs.  Dmitri would like to get one dog for his home as well.     Regarding his asthma, he states that it is well controlled. Dmitri  is using albuterol HFA  less than twice per week for rescue from chest symptoms. He is waking up less than twice per month due to chest symptoms.  There has been no use of oral steroids since last visit. The patient denies current chest tightness, cough, wheeze or SOB.   He denies interval ED/urgent care/PCP/other specialists visits for asthma flare.      Patient Active Problem List   Diagnosis     Intermittent asthma,  uncomplicated     Seasonal allergic rhinitis due to pollen     Non-seasonal allergic rhinitis due to animal hair and dander     Chronic allergic conjunctivitis       Past Medical History:   Diagnosis Date     Allergic rhinitis due to other allergen      Mild intermittent asthma       Problem (# of Occurrences) Relation (Name,Age of Onset)    Cardiovascular (1) Maternal Grandfather: stents    HEART DISEASE (1) Maternal Grandfather        History reviewed. No pertinent surgical history.  Social History     Social History     Marital status: Single     Spouse name: N/A     Number of children: N/A     Years of education: N/A     Social History Main Topics     Smoking status: Former Smoker     Years: 3.00     Types: Cigarettes, Dip, chew, snus or snuff     Smokeless tobacco: Former User     Types: Chew     Alcohol use Yes     Drug use: No     Sexual activity: Yes     Partners: Female     Birth control/ protection: Condom     Other Topics Concern     Parent/Sibling W/ Cabg, Mi Or Angioplasty Before 65f 55m? No     Social History Narrative    April 21, 2017    ENVIRONMENTAL HISTORY: [place] is heated with a gas furnace. They does have central air conditioning. The patient's bedroom is furnished with carpeting in bedroom and fabric window coverings.  Pets inside the house include 1 rabbit. There is not history of cockroach or mice infestation. There are no smokers in the house.  The house does not have a damp basement.            Review of Systems   Constitutional: Positive for activity change (decreased activity). Negative for fatigue and fever.   HENT: Positive for postnasal drip and sneezing. Negative for congestion, dental problem, ear pain, facial swelling, nosebleeds, rhinorrhea and sinus pressure.    Eyes: Positive for itching. Negative for discharge and redness.   Respiratory: Negative for cough, chest tightness, shortness of breath and wheezing.     Cardiovascular: Negative for chest pain.   Gastrointestinal: Negative for diarrhea, nausea and vomiting.   Musculoskeletal: Negative for arthralgias, joint swelling and myalgias.   Skin: Negative for rash.   Allergic/Immunologic: Positive for environmental allergies.   Neurological: Negative for headaches.   Hematological: Negative for adenopathy.   Psychiatric/Behavioral: Negative for behavioral problems and self-injury.           Current Outpatient Prescriptions:      ibuprofen (ADVIL) 200 MG capsule, Take 200 mg by mouth every 4 hours as needed Reported on 4/28/2017, Disp: , Rfl:      ORDER FOR ALLERGEN IMMUNOTHERAPY, Name of Mix: Mix #1  Cat, Dog Cat Hair, Standardized 10,000 BAU/mL, ALK  2.0 ml Dog Hair Dander, A. P.  1:100 w/v, HS  1.0 ml  Diluent: HSA qs to 5ml, Disp: 5 mL, Rfl: PRN     albuterol (PROAIR HFA/PROVENTIL HFA/VENTOLIN HFA) 108 (90 BASE) MCG/ACT Inhaler, Inhale 2 puffs into the lungs every 4 hours as needed for shortness of breath / dyspnea or wheezing, Disp: , Rfl:      ORDER FOR ALLERGEN IMMUNOTHERAPY, Name of Mix: Mix #2  Tree  Birch Mix GLY 1:20 w/v, HS  0.5 ml Boxelder-Maple  Mix BHR (Boxelder Hard Red) 1:20 w/v, HS  0.5 ml [place] Mix GLY 1:20 w/v, HS 0.5 ml Oak Mix RVW GLY 1:20 w/v, HS 0.5 ml [place] Tree, Black GLY 1:20 w/v, HS 0.5 ml [place], Black GLY 1:20 w/v, HS 0.5 ml Diluent: HSA qs to 5ml, Disp: 5 mL, Rfl: PRN     EPINEPHrine (AUVI-Q) 0.3 MG/0.3ML injection, Inject 0.3 mLs (0.3 mg) into the muscle as needed for anaphylaxis (Patient not taking: Reported on 4/2/2018), Disp: 0.6 mL, Rfl: 1     fluticasone (FLONASE) 50 MCG/ACT spray, Spray 2 sprays into both nostrils daily (Patient not taking: Reported on 9/29/2017), Disp: 1 Bottle, Rfl: 11  Immunization History   Administered Date(s) Administered     HEPA 08/25/2010     HepB 08/18/2004, 09/24/2004, 08/03/2005     Hib (PRP-T) 1990, 1990, 1990, 05/04/1993     Historical DTP/aP 1990, 1990, 1992,  "10/05/1993, 05/14/1997     Influenza (IIV3) PF 11/27/2007, 12/01/2008, 10/05/2009, 09/17/2010     MMR 05/04/1993, 08/18/2004     Meningococcal (Menactra ) 08/25/2010     OPV, trivalent, live 1990, 1992, 10/05/1993     Poliovirus, inactivated (IPV) 05/14/1997     TDAP Vaccine (Adacel) 06/01/2017     Tetanus 08/18/2004     Allergies   Allergen Reactions     Amoxicillin Unknown     Ceclor [Cefaclor] Unknown     Erythrocin [Erythromycin] Diarrhea     Penicillins Unknown     OBJECTIVE:                                                                 /87 (BP Location: Left arm, Patient Position: Sitting, Cuff Size: Adult Regular)  Pulse 69  Temp 98.2  F (36.8  C) (Tympanic)  Resp 16  Ht 1.71 m (5' 7.32\")  Wt 77.6 kg (171 lb 1.2 oz)  SpO2 99%  BMI 26.54 kg/m2        Physical Exam   Constitutional: No distress.   HENT:   Head: Normocephalic and atraumatic.   Right Ear: Tympanic membrane, external ear and ear canal normal.   Left Ear: Tympanic membrane, external ear and ear canal normal.   Nose: No mucosal edema or rhinorrhea.   Mouth/Throat: Oropharynx is clear and moist and mucous membranes are normal. No oropharyngeal exudate, posterior oropharyngeal edema or posterior oropharyngeal erythema.   Eyes: Conjunctivae are normal. Right eye exhibits no discharge. Left eye exhibits no discharge.   Neck: Normal range of motion.   Cardiovascular: Normal rate, regular rhythm and normal heart sounds.    No murmur heard.  Pulmonary/Chest: Effort normal and breath sounds normal. No respiratory distress. He has no wheezes. He has no rales.   Musculoskeletal: Normal range of motion.   Neurological: He is alert.   Skin: Skin is warm. He is not diaphoretic.   Psychiatric: Affect normal.   Nursing note and vitals reviewed.      Asthma Control Test (ACT) total score: 24       ASSESSMENT/PLAN:      Problem List Items Addressed This Visit        Respiratory    1. Intermittent asthma, uncomplicated  Currently well " controlled with albuterol inhaler as needed.  -Continue as is.    2. Seasonal allergic rhinitis due to pollen - Primary  The patient cannot  about efficacy of treatment with pollen extract yet since even though it is April, it is still snowing outside, but he reports a significant improvement  in his symptoms around cats and dogs.  He would like to get a dog at his home. We discussed that before doing that, he probably should spend more time with a dog at his future father-in-law and see how he tolerates it.  He also should be washing his dog once a week to decrease the amount of antigen.  -If he starts having symptoms, he may restart intranasal fluticasone 2 sprays in each nostril once a day.    3. Non-seasonal allergic rhinitis due to animal hair and dander       Infectious/Inflammatory    4. Chronic allergic conjunctivitis  Currently well controlled. We'll see if Flonase would control his conjunctivitis symptoms. If not, may recommend ketotifen eyedrops over-the-counter.            Follow up in 3 months or sooner if needed.    Thank you for allowing us to participate in the care of this patient. Please feel free to contact us if there are any questions or concerns about the patient.    Disclaimer: This note consists of symbols derived from keyboarding, dictation and/or voice recognition software. As a result, there may be errors in the script that have gone undetected. Please consider this when interpreting information found in this chart.    Carlo Alegre MD, FACI  Allergy, Asthma and Immunology  [place], [place] and [place]

## 2018-04-02 NOTE — MR AVS SNAPSHOT
After Visit Summary   4/2/2018    Dmitri Mcguire    MRN: [id number]           Patient Information     Date Of Birth          1990        Visit Information        Provider Department      4/2/2018 4:20 PM Carlo Alegre MD [facility]        Today's Diagnoses     Chronic seasonal allergic rhinitis due to pollen    -  1    Non-seasonal allergic rhinitis due to animal hair and dander        Intermittent asthma, uncomplicated        Chronic allergic conjunctivitis           Follow-ups after your visit        Follow-up notes from your care team     Return in about 3 months (around 7/2/2018), or if symptoms worsen or fail to improve, for rhinitis follow up.      Your next 10 appointments already scheduled     Apr 06, 2018  4:15 PM CDT   Nurse Only with ALLERGY [facility] [place] ([facility])    [address] [id number] [phone]           Every allergy patient MUST wait 30 minutes after their allergy shot. No exceptions.  Xolair shots #1-3 should plan to wait 2 hours in clinic Xolair shots after #4 should plan 30 minute wait in clinic            Apr 13, 2018  4:15 PM CDT   Nurse Only with ALLERGY [facility] [place] ([facility])    [address] [address] [phone]           Every allergy patient MUST wait 30 minutes after their allergy shot. No exceptions.  Xolair shots #1-3 should plan to wait 2 hours in clinic Xolair shots after #4 should plan 30 minute wait in clinic              Who to contact     If you have questions or need follow up information about today's clinic visit or your schedule please contact [facility] directly at [phone].  Normal or non-critical lab and imaging results will be communicated to you by MyChart, letter or phone within 4 business days after the clinic has received the results. If you do not hear from us within 7 days,  "please contact the clinic through Bravo[employer] or phone. If you have a critical or abnormal lab result, we will notify you by phone as soon as possible.  Submit refill requests through [employer] or call your pharmacy and they will forward the refill request to us. Please allow 3 business days for your refill to be completed.          Additional Information About Your Visit        AppArchitectharGanjiwang Information     Bravo[employer] lets you send messages to your doctor, view your test results, renew your prescriptions, schedule appointments and more. To sign up, go to [email or web address] . Click on \"Log in\" on the left side of the screen, which will take you to the Welcome page. Then click on \"Sign up Now\" on the right side of the page.     You will be asked to enter the access code listed below, as well as some personal information. Please follow the directions to create your username and password.     Your access code is: [email or web address]  Expires: 2018  5:40 PM     Your access code will  in 90 days. If you need help or a new code, please call your [facility] clinic or [phone].        Care EveryWhere ID     This is your Care EveryWhere ID. This could be used by other organizations to access your [facility] medical records          Your Vitals Were     Pulse Temperature Respirations Height Pulse Oximetry BMI (Body Mass Index)    69 98.2  F (36.8  C) (Tympanic) 16 1.71 m (5' 7.32\") 99% 26.54 kg/m2       Blood Pressure from Last 3 Encounters:   18 138/87   17 117/78   17 124/78    Weight from Last 3 Encounters:   18 77.6 kg (171 lb 1.2 oz)   17 77 kg (169 lb 12.1 oz)   17 76.6 kg (168 lb 14.4 oz)              Today, you had the following     No orders found for display       Primary Care Provider Office Phone # Fax #    [facility] [phone] [address] [facility] [address]        Equal Access to Services     EDUIN PENNINGTON AH: Elsie massey " tommy Lyle, waaxda luqadaha, qaybta kaalmada adecrispin, josiah valentine. So [facility] [phone].    ATENCIÓN: Si malcolmla louisa, tiene a spencer disposición servicios gratuitos de asistencia lingüística. Mushtaq al [phone].    We comply with applicable federal civil rights laws and [place] laws. We do not discriminate on the basis of race, color, national origin, age, disability, sex, sexual orientation, or gender identity.            Thank you!     Thank you for choosing [facility]  for your care. Our goal is always to provide you with excellent care. Hearing back from our patients is one way we can continue to improve our services. Please take a few minutes to complete the written survey that you may receive in the mail after your visit with us. Thank you!             Your Updated Medication List - Protect others around you: Learn how to safely use, store and throw away your medicines at www.disposemymeds.org.          This list is accurate as of 4/2/18 11:59 PM.  Always use your most recent med list.                   Brand Name Dispense Instructions for use Diagnosis    ADVIL 200 MG capsule   Generic drug:  ibuprofen      Take 200 mg by mouth every 4 hours as needed Reported on 4/28/2017        albuterol 108 (90 BASE) MCG/ACT Inhaler    PROAIR HFA/PROVENTIL HFA/VENTOLIN HFA     Inhale 2 puffs into the lungs every 4 hours as needed for shortness of breath / dyspnea or wheezing        * ALLERGEN IMMUNOTHERAPY PRESCRIPTION     5 mL    Name of Mix: Mix #2  Tree  Birch Mix GLY 1:20 w/v, HS  0.5 ml Boxelder-Maple  Mix BHR (Boxelder Hard Red) 1:20 w/v, HS  0.5 ml [place] Mix GLY 1:20 w/v, HS 0.5 ml Oak Mix RVW GLY 1:20 w/v, HS 0.5 ml [place] Tree, Black GLY 1:20 w/v, HS 0.5 ml [place], Black GLY 1:20 w/v, HS 0.5 ml Diluent: HSA qs to 5ml    Chronic seasonal allergic rhinitis due to pollen, Non-seasonal allergic rhinitis due to animal hair and dander, Chronic allergic  conjunctivitis, Need for desensitization to allergens       * ALLERGEN IMMUNOTHERAPY PRESCRIPTION     5 mL    Name of Mix: Mix #1  Cat, Dog Cat Hair, Standardized 10,000 BAU/mL, ALK  2.0 ml Dog Hair Dander, A. P.  1:100 w/v, HS  1.0 ml  Diluent: HSA qs to 5ml    Non-seasonal allergic rhinitis due to animal hair and dander, Chronic allergic conjunctivitis, Need for desensitization to allergens       EPINEPHrine 0.3 MG/0.3ML injection 2-pack    AUVI-Q    0.6 mL    Inject 0.3 mLs (0.3 mg) into the muscle as needed for anaphylaxis    Seasonal allergic rhinitis due to pollen, Non-seasonal allergic rhinitis due to animal hair and dander       fluticasone 50 MCG/ACT spray    FLONASE    1 Bottle    Spray 2 sprays into both nostrils daily    Atopic rhinitis       * Notice:  This list has 2 medication(s) that are the same as other medications prescribed for you. Read the directions carefully, and ask your doctor or other care provider to review them with you.

## 2018-04-02 NOTE — LETTER
4/2/2018         RE: Dmitri Mcguire  [address] [place] [address]        Dear Colleague,    Thank you for referring your patient, Dmitri Mcguire, to the [facility]. Please see a copy of my visit note below.    SUBJECTIVE:                                                                 Dmitri Mcguire presents today to our Allergy Clinic at [facility] for a follow up visit.  As you know, he is a 28 year old male with allergic rhinoconjunctivitis and asthma.    Percutaneous skin puncture testing for aeroallergens performed on April 28, 2017 showed sensitivity for dog, cat, rabbit and tree pollen (Birch mix, [place], Oak, [place], and Black [place]). Borderline sensitivity for [place] noted.   On allergen immunotherapy. Cat/dog since 6/30/2017. Trees since 9/22/17.          Allergy Immunotherapy  Date/time of injection(s): 4/2/18     Vial Color Content  Dose   Red 1:1 Cat, Dog  0.5 (Given 3/30/18-not due today)   Red 1:1 Trees  0.25     He tolerates injections well without persistent large local reactions or systemic reactions.  He doesn't use intranasal steroids. Doesn't take antihistamines regularly.    The patient denies clear rhinorrhea, nasal itch, stuffiness, sneezing, itchy/watery eyes or interval sinusitis symptoms of fever, facial pain or purulent rhinorrhea.  The father of his fiancée is bringing dogs.  In the past, he was not able to stay in that housefor more than several minutes.  Nowadays, he has very mild symptoms when he is at that home, that has 1 indoor dog.  He has more persistent and severe symptoms when he is around multiple dogs.  Dmitri would like to get one dog for his home as well.     Regarding his asthma, he states that it is well controlled. Dmitri  is using albuterol HFA  less than twice per week for rescue from chest symptoms. He is waking up less than twice per month due to chest symptoms.  There has been no use of oral steroids  since last visit. The patient denies current chest tightness, cough, wheeze or SOB.   He denies interval ED/urgent care/PCP/other specialists visits for asthma flare.      Patient Active Problem List   Diagnosis     Intermittent asthma, uncomplicated     Seasonal allergic rhinitis due to pollen     Non-seasonal allergic rhinitis due to animal hair and dander     Chronic allergic conjunctivitis       Past Medical History:   Diagnosis Date     Allergic rhinitis due to other allergen      Mild intermittent asthma       Problem (# of Occurrences) Relation (Name,Age of Onset)    Cardiovascular (1) Maternal Grandfather: stents    HEART DISEASE (1) Maternal Grandfather        History reviewed. No pertinent surgical history.  Social History     Social History     Marital status: Single     Spouse name: N/A     Number of children: N/A     Years of education: N/A     Social History Main Topics     Smoking status: Former Smoker     Years: 3.00     Types: Cigarettes, Dip, chew, snus or snuff     Smokeless tobacco: Former User     Types: Chew     Alcohol use Yes     Drug use: No     Sexual activity: Yes     Partners: Female     Birth control/ protection: Condom     Other Topics Concern     Parent/Sibling W/ Cabg, Mi Or Angioplasty Before 65f 55m? No     Social History Narrative    April 21, 2017    ENVIRONMENTAL HISTORY: [place] is heated with a gas furnace. They does have central air conditioning. The patient's bedroom is furnished with carpeting in bedroom and fabric window coverings.  Pets inside the house include 1 rabbit. There is not history of cockroach or mice infestation. There are no smokers in the house.  The house does not have a damp basement.            Review of Systems   Constitutional: Positive for activity change (decreased activity). Negative for fatigue and fever.   HENT: Positive for postnasal drip and sneezing. Negative for congestion, dental problem,  ear pain, facial swelling, nosebleeds, rhinorrhea and sinus pressure.    Eyes: Positive for itching. Negative for discharge and redness.   Respiratory: Negative for cough, chest tightness, shortness of breath and wheezing.    Cardiovascular: Negative for chest pain.   Gastrointestinal: Negative for diarrhea, nausea and vomiting.   Musculoskeletal: Negative for arthralgias, joint swelling and myalgias.   Skin: Negative for rash.   Allergic/Immunologic: Positive for environmental allergies.   Neurological: Negative for headaches.   Hematological: Negative for adenopathy.   Psychiatric/Behavioral: Negative for behavioral problems and self-injury.           Current Outpatient Prescriptions:      ibuprofen (ADVIL) 200 MG capsule, Take 200 mg by mouth every 4 hours as needed Reported on 4/28/2017, Disp: , Rfl:      ORDER FOR ALLERGEN IMMUNOTHERAPY, Name of Mix: Mix #1  Cat, Dog Cat Hair, Standardized 10,000 BAU/mL, ALK  2.0 ml Dog Hair Dander, A. P.  1:100 w/v, HS  1.0 ml  Diluent: HSA qs to 5ml, Disp: 5 mL, Rfl: PRN     albuterol (PROAIR HFA/PROVENTIL HFA/VENTOLIN HFA) 108 (90 BASE) MCG/ACT Inhaler, Inhale 2 puffs into the lungs every 4 hours as needed for shortness of breath / dyspnea or wheezing, Disp: , Rfl:      ORDER FOR ALLERGEN IMMUNOTHERAPY, Name of Mix: Mix #2  Tree  Birch Mix GLY 1:20 w/v, HS  0.5 ml Boxelder-Maple  Mix BHR (Boxelder Hard Red) 1:20 w/v, HS  0.5 ml [place] Mix GLY 1:20 w/v, HS 0.5 ml Oak Mix RVW GLY 1:20 w/v, HS 0.5 ml [place] Tree, Black GLY 1:20 w/v, HS 0.5 ml [place], Black GLY 1:20 w/v, HS 0.5 ml Diluent: HSA qs to 5ml, Disp: 5 mL, Rfl: PRN     EPINEPHrine (AUVI-Q) 0.3 MG/0.3ML injection, Inject 0.3 mLs (0.3 mg) into the muscle as needed for anaphylaxis (Patient not taking: Reported on 4/2/2018), Disp: 0.6 mL, Rfl: 1     fluticasone (FLONASE) 50 MCG/ACT spray, Spray 2 sprays into both nostrils daily (Patient not taking: Reported on 9/29/2017), Disp: 1 Bottle, Rfl: 11  Immunization History  "  Administered Date(s) Administered     HEPA 08/25/2010     HepB 08/18/2004, 09/24/2004, 08/03/2005     Hib (PRP-T) 1990, [birth year], 05/04/1993     Historical DTP/aP 1990, 1990, 1992, 10/05/1993, 05/14/1997     Influenza (IIV3) PF 11/27/2007, 12/01/2008, 10/05/2009, 09/17/2010     MMR 05/04/1993, 08/18/2004     Meningococcal (Menactra ) 08/25/2010     OPV, trivalent, live 1990, 1992, 10/05/1993     Poliovirus, inactivated (IPV) 05/14/1997     TDAP Vaccine (Adacel) 06/01/2017     Tetanus 08/18/2004     Allergies   Allergen Reactions     Amoxicillin Unknown     Ceclor [Cefaclor] Unknown     Erythrocin [Erythromycin] Diarrhea     Penicillins Unknown     OBJECTIVE:                                                                 /87 (BP Location: Left arm, Patient Position: Sitting, Cuff Size: Adult Regular)  Pulse 69  Temp 98.2  F (36.8  C) (Tympanic)  Resp 16  Ht 1.71 m (5' 7.32\")  Wt 77.6 kg (171 lb 1.2 oz)  SpO2 99%  BMI 26.54 kg/m2        Physical Exam   Constitutional: No distress.   HENT:   Head: Normocephalic and atraumatic.   Right Ear: Tympanic membrane, external ear and ear canal normal.   Left Ear: Tympanic membrane, external ear and ear canal normal.   Nose: No mucosal edema or rhinorrhea.   Mouth/Throat: Oropharynx is clear and moist and mucous membranes are normal. No oropharyngeal exudate, posterior oropharyngeal edema or posterior oropharyngeal erythema.   Eyes: Conjunctivae are normal. Right eye exhibits no discharge. Left eye exhibits no discharge.   Neck: Normal range of motion.   Cardiovascular: Normal rate, regular rhythm and normal heart sounds.    No murmur heard.  Pulmonary/Chest: Effort normal and breath sounds normal. No respiratory distress. He has no wheezes. He has no rales.   Musculoskeletal: Normal range of motion.   Neurological: He is alert.   Skin: Skin is warm. He is not diaphoretic.   Psychiatric: Affect normal.   Nursing " note and vitals reviewed.      Asthma Control Test (ACT) total score: 24       ASSESSMENT/PLAN:      Problem List Items Addressed This Visit        Respiratory    1. Intermittent asthma, uncomplicated  Currently well controlled with albuterol inhaler as needed.  -Continue as is.    2. Seasonal allergic rhinitis due to pollen - Primary  The patient cannot  about efficacy of treatment with pollen extract yet since even though it is April, it is still snowing outside, but he reports a significant improvement  in his symptoms around cats and dogs.  He would like to get a dog at his home. We discussed that before doing that, he probably should spend more time with a dog at his future father-in-law and see how he tolerates it.  He also should be washing his dog once a week to decrease the amount of antigen.  -If he starts having symptoms, he may restart intranasal fluticasone 2 sprays in each nostril once a day.    3. Non-seasonal allergic rhinitis due to animal hair and dander       Infectious/Inflammatory    4. Chronic allergic conjunctivitis  Currently well controlled. We'll see if Flonase would control his conjunctivitis symptoms. If not, may recommend ketotifen eyedrops over-the-counter.            Follow up in 3 months or sooner if needed.    Thank you for allowing us to participate in the care of this patient. Please feel free to contact us if there are any questions or concerns about the patient.    Disclaimer: This note consists of symbols derived from keyboarding, dictation and/or voice recognition software. As a result, there may be errors in the script that have gone undetected. Please consider this when interpreting information found in this chart.    Carlo Alegre MD, [facility]  Allergy, Asthma and Immunology  [place], [place] and [place]      Again, thank you for allowing me to participate in the care of your patient.        Sincerely,        Carlo Alegre MD

## 2018-04-02 NOTE — NURSING NOTE
"Chief Complaint   Patient presents with     Allergy Recheck     immunotherapy follow up       Initial /87 (BP Location: Left arm, Patient Position: Sitting, Cuff Size: Adult Regular)  Pulse 69  Temp 98.2  F (36.8  C) (Tympanic)  Resp 16  SpO2 99% Estimated body mass index is 26.33 kg/(m^2) as calculated from the following:    Height as of 9/29/17: 1.71 m (5' 7.32\").    Weight as of 9/29/17: 77 kg (169 lb 12.1 oz).  Medication Reconciliation: complete    "

## 2018-04-02 NOTE — MR AVS SNAPSHOT
After Visit Summary   4/2/2018    Dmitri Mcguire    MRN: [id number]           Patient Information     Date Of Birth          1990        Visit Information        Provider Department      4/2/2018 4:00 PM ALLERGY [facility]        Today's Diagnoses     Allergic rhinitis    -  1       Follow-ups after your visit        Your next 10 appointments already scheduled     Apr 06, 2018  4:15 PM CDT   Nurse Only with ALLERGY [facility] ([facility])    [address] [address]   [phone]           Every allergy patient MUST wait 30 minutes after their allergy shot. No exceptions.  Xolair shots #1-3 should plan to wait 2 hours in clinic Xolair shots after #4 should plan 30 minute wait in clinic            Apr 13, 2018  4:15 PM CDT   Nurse Only with ALLERGY [facility] ([facility])    [address] [address]   [phone]           Every allergy patient MUST wait 30 minutes after their allergy shot. No exceptions.  Xolair shots #1-3 should plan to wait 2 hours in clinic Xolair shots after #4 should plan 30 minute wait in clinic              Who to contact     If you have questions or need follow up information about today's clinic visit or your schedule please contact [facility] directly at [phone].  Normal or non-critical lab and imaging results will be communicated to you by MyChart, letter or phone within 4 business days after the clinic has received the results. If you do not hear from us within 7 days, please contact the clinic through MyChart or phone. If you have a critical or abnormal lab result, we will notify you by phone as soon as possible.  Submit refill requests through Lifes[employer] or call your pharmacy and they will forward the refill request to us. Please allow 3 business days for your refill to be completed.           "Additional Information About Your Visit        MyChart Information     Bunndle lets you send messages to your doctor, view your test results, renew your prescriptions, schedule appointments and more. To sign up, go to [email or web address] . Click on \"Log in\" on the left side of the screen, which will take you to the Welcome page. Then click on \"Sign up Now\" on the right side of the page.     You will be asked to enter the access code listed below, as well as some personal information. Please follow the directions to create your username and password.     Your access code is: [email or web address]  Expires: 2018  5:40 PM     Your access code will  in 90 days. If you need help or a new code, please call your [place] clinic or [phone].        Care EveryWhere ID     This is your Care EveryWhere ID. This could be used by other organizations to access your [place] medical records  [id number]         Blood Pressure from Last 3 Encounters:   18 138/87   17 117/78   17 124/78    Weight from Last 3 Encounters:   18 77.6 kg (171 lb 1.2 oz)   17 77 kg (169 lb 12.1 oz)   17 76.6 kg (168 lb 14.4 oz)              We Performed the Following     Allergy Shot: One injection        Primary Care Provider Office Phone # Fax #    [facility] [phone] [address] [address] [address]        Equal Access to Services     EDUIN PENNINGTON : Hadii aurelio ku hadasho Soomaali, waaxda luqadaha, qaybta kaalmada adeegyada, joisah valentine. So [facility] [phone].    ATENCIÓN: Si habla español, tiene a spencer disposición servicios gratuitos de asistencia lingüística. Llame al [phone].    We comply with applicable federal civil rights laws and [place] laws. We do not discriminate on the basis of race, color, national origin, age, disability, sex, sexual orientation, or gender identity.            Thank you!     Thank you for " choosing [facility]  for your care. Our goal is always to provide you with excellent care. Hearing back from our patients is one way we can continue to improve our services. Please take a few minutes to complete the written survey that you may receive in the mail after your visit with us. Thank you!             Your Updated Medication List - Protect others around you: Learn how to safely use, store and throw away your medicines at www.disposemymeds.org.          This list is accurate as of 4/2/18  5:17 PM.  Always use your most recent med list.                   Brand Name Dispense Instructions for use Diagnosis    ADVIL 200 MG capsule   Generic drug:  ibuprofen      Take 200 mg by mouth every 4 hours as needed Reported on 4/28/2017        albuterol 108 (90 BASE) MCG/ACT Inhaler    PROAIR HFA/PROVENTIL HFA/VENTOLIN HFA     Inhale 2 puffs into the lungs every 4 hours as needed for shortness of breath / dyspnea or wheezing        * ALLERGEN IMMUNOTHERAPY PRESCRIPTION     5 mL    Name of Mix: Mix #2  Tree  Birch Mix GLY 1:20 w/v, HS  0.5 ml Boxelder-Maple  Mix BHR (Boxelder Hard Red) 1:20 w/v, HS  0.5 ml [place] Mix GLY 1:20 w/v, HS 0.5 ml Oak Mix RVW GLY 1:20 w/v, HS 0.5 ml [place] Tree, Black GLY 1:20 w/v, HS 0.5 ml [place], Black GLY 1:20 w/v, HS 0.5 ml Diluent: HSA qs to 5ml    Chronic seasonal allergic rhinitis due to pollen, Non-seasonal allergic rhinitis due to animal hair and dander, Chronic allergic conjunctivitis, Need for desensitization to allergens       * ALLERGEN IMMUNOTHERAPY PRESCRIPTION     5 mL    Name of Mix: Mix #1  Cat, Dog Cat Hair, Standardized 10,000 BAU/mL, ALK  2.0 ml Dog Hair Dander, A. P.  1:100 w/v, HS  1.0 ml  Diluent: HSA qs to 5ml    Non-seasonal allergic rhinitis due to animal hair and dander, Chronic allergic conjunctivitis, Need for desensitization to allergens       EPINEPHrine 0.3 MG/0.3ML injection 2-pack    AUVI-Q    0.6 mL    Inject 0.3 mLs (0.3 mg) into the muscle  as needed for anaphylaxis    Seasonal allergic rhinitis due to pollen, Non-seasonal allergic rhinitis due to animal hair and dander       fluticasone 50 MCG/ACT spray    FLONASE    1 Bottle    Spray 2 sprays into both nostrils daily    Atopic rhinitis       * Notice:  This list has 2 medication(s) that are the same as other medications prescribed for you. Read the directions carefully, and ask your doctor or other care provider to review them with you.

## 2018-04-03 ASSESSMENT — ASTHMA QUESTIONNAIRES: ACT_TOTALSCORE: 24

## 2018-04-06 ENCOUNTER — ALLIED HEALTH/NURSE VISIT (OUTPATIENT)
Dept: ALLERGY | Facility: CLINIC | Age: 28
End: 2018-04-06
Payer: COMMERCIAL

## 2018-04-06 DIAGNOSIS — J30.9 ALLERGIC RHINITIS: Primary | ICD-10-CM

## 2018-04-06 PROCEDURE — 99207 ZZC DROP WITH A PROCEDURE: CPT

## 2018-04-06 PROCEDURE — 95115 IMMUNOTHERAPY ONE INJECTION: CPT

## 2018-04-13 ENCOUNTER — ALLIED HEALTH/NURSE VISIT (OUTPATIENT)
Dept: ALLERGY | Facility: CLINIC | Age: 28
End: 2018-04-13
Payer: COMMERCIAL

## 2018-04-13 DIAGNOSIS — J30.9 ALLERGIC RHINITIS: Primary | ICD-10-CM

## 2018-04-13 PROCEDURE — 99207 ZZC DROP WITH A PROCEDURE: CPT

## 2018-04-13 PROCEDURE — 95117 IMMUNOTHERAPY INJECTIONS: CPT

## 2018-04-13 NOTE — MR AVS SNAPSHOT
"              After Visit Summary   2018    Dmitri Mcguire    MRN: [id number]           Patient Information     Date Of Birth          1990        Visit Information        Provider Department      2018 4:15 PM ALLERGY [facility] - [facility]        Today's Diagnoses     Allergic rhinitis    -  1       Follow-ups after your visit        Who to contact     If you have questions or need follow up information about today's clinic visit or your schedule please contact [facility] directly at [phone].  Normal or non-critical lab and imaging results will be communicated to you by MyChart, letter or phone within 4 business days after the clinic has received the results. If you do not hear from us within 7 days, please contact the clinic through [employer] Grou[employer] or phone. If you have a critical or abnormal lab result, we will notify you by phone as soon as possible.  Submit refill requests through Graematter or call your pharmacy and they will forward the refill request to us. Please allow 3 business days for your refill to be completed.          Additional Information About Your Visit        MyChart Information     Graematter lets you send messages to your doctor, view your test results, renew your prescriptions, schedule appointments and more. To sign up, go to [email or web address] . Click on \"Log in\" on the left side of the screen, which will take you to the Welcome page. Then click on \"Sign up Now\" on the right side of the page.     You will be asked to enter the access code listed below, as well as some personal information. Please follow the directions to create your username and password.     Your access code is: [email or web address]  Expires: 2018  5:40 PM     Your access code will  in 90 days. If you need help or a new code, please call your [facility] or [phone].        Care EveryWhere ID     This is your Care EveryWhere ID. This could be used by other organizations to " access your [place] medical records  [id number]         Blood Pressure from Last 3 Encounters:   04/02/18 138/87   09/29/17 117/78   06/01/17 124/78    Weight from Last 3 Encounters:   04/02/18 77.6 kg (171 lb 1.2 oz)   09/29/17 77 kg (169 lb 12.1 oz)   06/01/17 76.6 kg (168 lb 14.4 oz)              We Performed the Following     Allergy Shot: Two or more injections        Primary Care Provider Office Phone # Fax #    [facility] [phone] [address] [address] [facility] [address]        Equal Access to Services     EDUIN PENNINGTON : Hadii aurelio Lyle, waregulo zuluaga, qaybta kaalmada sebastián, josiah valentine. So [facility] [phone].    ATENCIÓN: Si habla español, tiene a spencer disposición servicios gratuitos de asistencia lingüística. Llame al [phone].    We comply with applicable federal civil rights laws and [place] laws. We do not discriminate on the basis of race, color, national origin, age, disability, sex, sexual orientation, or gender identity.            Thank you!     Thank you for choosing [facility]  for your care. Our goal is always to provide you with excellent care. Hearing back from our patients is one way we can continue to improve our services. Please take a few minutes to complete the written survey that you may receive in the mail after your visit with us. Thank you!             Your Updated Medication List - Protect others around you: Learn how to safely use, store and throw away your medicines at www.disposemymeds.org.          This list is accurate as of 4/13/18  5:05 PM.  Always use your most recent med list.                   Brand Name Dispense Instructions for use Diagnosis    ADVIL 200 MG capsule   Generic drug:  ibuprofen      Take 200 mg by mouth every 4 hours as needed Reported on 4/28/2017        albuterol 108 (90 Base) MCG/ACT Inhaler    PROAIR HFA/PROVENTIL HFA/VENTOLIN HFA     Inhale 2  puffs into the lungs every 4 hours as needed for shortness of breath / dyspnea or wheezing        * ALLERGEN IMMUNOTHERAPY PRESCRIPTION     5 mL    Name of Mix: Mix #2  Tree  Birch Mix GLY 1:20 w/v, HS  0.5 ml Boxelder-Maple  Mix BHR (Boxelder Hard Red) 1:20 w/v, HS  0.5 ml [place] Mix GLY 1:20 w/v, HS 0.5 ml Oak Mix RVW GLY 1:20 w/v, HS 0.5 ml [place] Tree, Black GLY 1:20 w/v, HS 0.5 ml [place], Black GLY 1:20 w/v, HS 0.5 ml Diluent: HSA qs to 5ml    Chronic seasonal allergic rhinitis due to pollen, Non-seasonal allergic rhinitis due to animal hair and dander, Chronic allergic conjunctivitis, Need for desensitization to allergens       * ALLERGEN IMMUNOTHERAPY PRESCRIPTION     5 mL    Name of Mix: Mix #1  Cat, Dog Cat Hair, Standardized 10,000 BAU/mL, ALK  2.0 ml Dog Hair Dander, A. P.  1:100 w/v, HS  1.0 ml  Diluent: HSA qs to 5ml    Non-seasonal allergic rhinitis due to animal hair and dander, Chronic allergic conjunctivitis, Need for desensitization to allergens       EPINEPHrine 0.3 MG/0.3ML injection 2-pack    AUVI-Q    0.6 mL    Inject 0.3 mLs (0.3 mg) into the muscle as needed for anaphylaxis    Seasonal allergic rhinitis due to pollen, Non-seasonal allergic rhinitis due to animal hair and dander       fluticasone 50 MCG/ACT spray    FLONASE    1 Bottle    Spray 2 sprays into both nostrils daily    Atopic rhinitis       * Notice:  This list has 2 medication(s) that are the same as other medications prescribed for you. Read the directions carefully, and ask your doctor or other care provider to review them with you.

## 2018-04-27 ENCOUNTER — ALLIED HEALTH/NURSE VISIT (OUTPATIENT)
Dept: ALLERGY | Facility: CLINIC | Age: 28
End: 2018-04-27
Payer: COMMERCIAL

## 2018-04-27 DIAGNOSIS — J30.9 ALLERGIC RHINITIS: Primary | ICD-10-CM

## 2018-04-27 PROCEDURE — 95117 IMMUNOTHERAPY INJECTIONS: CPT

## 2018-04-27 PROCEDURE — 99207 ZZC DROP WITH A PROCEDURE: CPT

## 2018-04-27 NOTE — MR AVS SNAPSHOT
"              After Visit Summary   4/27/2018    Dmitri Mcguire    MRN: [id number]           Patient Information     Date Of Birth          1990        Visit Information        Provider Department      4/27/2018 4:00 PM ALLERGY [facility]        Today's Diagnoses     Allergic rhinitis    -  1       Follow-ups after your visit        Your next 10 appointments already scheduled     May 04, 2018  4:15 PM CDT   Nurse Only with ALLERGY [facility] ([facility])    [address] [facility] [address]   [phone]           Every allergy patient MUST wait 30 minutes after their allergy shot. No exceptions.  Xolair shots #1-3 should plan to wait 2 hours in clinic Xolair shots after #4 should plan 30 minute wait in clinic              Who to contact     If you have questions or need follow up information about today's clinic visit or your schedule please contact [facility] directly at [phone].  Normal or non-critical lab and imaging results will be communicated to you by QuantumSpherehart, letter or phone within 4 business days after the clinic has received the results. If you do not hear from us within 7 days, please contact the clinic through Hubs1t or phone. If you have a critical or abnormal lab result, we will notify you by phone as soon as possible.  Submit refill requests through Knowmia or call your pharmacy and they will forward the refill request to us. Please allow 3 business days for your refill to be completed.          Additional Information About Your Visit        [employer]Sphere[employer] Information     Knowmia lets you send messages to your doctor, view your test results, renew your prescriptions, schedule appointments and more. To sign up, go to [email or web address] . Click on \"Log in\" on the left side of the screen, which will take you to the Welcome page. Then click on \"Sign up Now\" on the right side of the page.     You will " be asked to enter the access code listed below, as well as some personal information. Please follow the directions to create your username and password.     Your access code is: [email or web address]  Expires: 2018  5:05 PM     Your access code will  in 90 days. If you need help or a new code, please call your [place] clinic or [phone].        Care EveryWhere ID     This is your Care EveryWhere ID. This could be used by other organizations to access your [place] medical records  [id number]         Blood Pressure from Last 3 Encounters:   18 138/87   17 117/78   17 124/78    Weight from Last 3 Encounters:   18 77.6 kg (171 lb 1.2 oz)   17 77 kg (169 lb 12.1 oz)   17 76.6 kg (168 lb 14.4 oz)              We Performed the Following     Allergy Shot: Two or more injections        Primary Care Provider Office Phone # Fax #    [facility] [phone] [address] [facility] [address]        Equal Access to Services     EDUIN PENNINGTON AH: Hadii aad ku hadasho Soxeniaali, waaxda luqadaha, qaybta kaalmada adeegyada, josiah umanzor hayfriedan dennis rahman . So [facility] [phone].    ATENCIÓN: Si habla español, tiene a spencer disposición servicios gratuitos de asistencia lingüística. Llame al [phone].    We comply with applicable federal civil rights laws and [place] laws. We do not discriminate on the basis of race, color, national origin, age, disability, sex, sexual orientation, or gender identity.            Thank you!     Thank you for choosing [facility]  for your care. Our goal is always to provide you with excellent care. Hearing back from our patients is one way we can continue to improve our services. Please take a few minutes to complete the written survey that you may receive in the mail after your visit with us. Thank you!             Your Updated Medication List - Protect others around you: Learn how to safely  use, store and throw away your medicines at www.disposemymeds.org.          This list is accurate as of 4/27/18  5:05 PM.  Always use your most recent med list.                   Brand Name Dispense Instructions for use Diagnosis    ADVIL 200 MG capsule   Generic drug:  ibuprofen      Take 200 mg by mouth every 4 hours as needed Reported on 4/28/2017        albuterol 108 (90 Base) MCG/ACT Inhaler    PROAIR HFA/PROVENTIL HFA/VENTOLIN HFA     Inhale 2 puffs into the lungs every 4 hours as needed for shortness of breath / dyspnea or wheezing        * ALLERGEN IMMUNOTHERAPY PRESCRIPTION     5 mL    Name of Mix: Mix #2  Tree  Birch Mix GLY 1:20 w/v, HS  0.5 ml Boxelder-Maple  Mix BHR (Boxelder Hard Red) 1:20 w/v, HS  0.5 ml [place] Mix GLY 1:20 w/v, HS 0.5 ml Oak Mix RVW GLY 1:20 w/v, HS 0.5 ml [place] Tree, Black GLY 1:20 w/v, HS 0.5 ml [place], Black GLY 1:20 w/v, HS 0.5 ml Diluent: HSA qs to 5ml    Chronic seasonal allergic rhinitis due to pollen, Non-seasonal allergic rhinitis due to animal hair and dander, Chronic allergic conjunctivitis, Need for desensitization to allergens       * ALLERGEN IMMUNOTHERAPY PRESCRIPTION     5 mL    Name of Mix: Mix #1  Cat, Dog Cat Hair, Standardized 10,000 BAU/mL, ALK  2.0 ml Dog Hair Dander, A. P.  1:100 w/v, HS  1.0 ml  Diluent: HSA qs to 5ml    Non-seasonal allergic rhinitis due to animal hair and dander, Chronic allergic conjunctivitis, Need for desensitization to allergens       EPINEPHrine 0.3 MG/0.3ML injection 2-pack    AUVI-Q    0.6 mL    Inject 0.3 mLs (0.3 mg) into the muscle as needed for anaphylaxis    Seasonal allergic rhinitis due to pollen, Non-seasonal allergic rhinitis due to animal hair and dander       fluticasone 50 MCG/ACT spray    FLONASE    1 Bottle    Spray 2 sprays into both nostrils daily    Atopic rhinitis       * Notice:  This list has 2 medication(s) that are the same as other medications prescribed for you. Read the directions carefully, and ask your  doctor or other care provider to review them with you.

## 2018-05-14 ENCOUNTER — ALLIED HEALTH/NURSE VISIT (OUTPATIENT)
Dept: ALLERGY | Facility: CLINIC | Age: 28
End: 2018-05-14
Payer: COMMERCIAL

## 2018-05-14 DIAGNOSIS — J30.9 ALLERGIC RHINITIS: Primary | ICD-10-CM

## 2018-05-14 PROCEDURE — 99207 ZZC DROP WITH A PROCEDURE: CPT

## 2018-05-14 PROCEDURE — 95115 IMMUNOTHERAPY ONE INJECTION: CPT

## 2018-05-14 NOTE — PROGRESS NOTES
Patient presented after waiting 30 minutes with no reaction to  injections. Discharged from clinic.  Jose Bueno RN

## 2018-05-14 NOTE — MR AVS SNAPSHOT
"              After Visit Summary   5/14/2018    Dmitri Mcguire    MRN: [id number]           Patient Information     Date Of Birth          1990        Visit Information        Provider Department      5/14/2018 4:45 PM ALLERGY [facility]        Today's Diagnoses     Allergic rhinitis    -  1       Follow-ups after your visit        Your next 10 appointments already scheduled     May 18, 2018  2:15 PM CDT   Nurse Only with ALLERGY [facility] ([facility])    [address] [facility] [address]   [phone]           Every allergy patient MUST wait 30 minutes after their allergy shot. No exceptions.  Xolair shots #1-3 should plan to wait 2 hours in clinic Xolair shots after #4 should plan 30 minute wait in clinic              Who to contact     If you have questions or need follow up information about today's clinic visit or your schedule please contact [facility] directly at [phone].  Normal or non-critical lab and imaging results will be communicated to you by check24hart, letter or phone within 4 business days after the clinic has received the results. If you do not hear from us within 7 days, please contact the clinic through [email or web address] or phone. If you have a critical or abnormal lab result, we will notify you by phone as soon as possible.  Submit refill requests through Mandy & Pandy or call your pharmacy and they will forward the refill request to us. Please allow 3 business days for your refill to be completed.          Additional Information About Your Visit        check24harAngioChem Information     Mandy & Pandy lets you send messages to your doctor, view your test results, renew your prescriptions, schedule appointments and more. To sign up, go to [email or web address] . Click on \"Log in\" on the left side of the screen, which will take you to the Welcome page. Then click on \"Sign up Now\" on the right side of the page.     You will " be asked to enter the access code listed below, as well as some personal information. Please follow the directions to create your username and password.     Your access code is: [email or web address]  Expires: 2018  5:05 PM     Your access code will  in 90 days. If you need help or a new code, please call your [place] clinic or [phone].        Care EveryWhere ID     This is your Care EveryWhere ID. This could be used by other organizations to access your [place] medical records  [id number]         Blood Pressure from Last 3 Encounters:   18 138/87   17 117/78   17 124/78    Weight from Last 3 Encounters:   18 77.6 kg (171 lb 1.2 oz)   17 77 kg (169 lb 12.1 oz)   17 76.6 kg (168 lb 14.4 oz)              We Performed the Following     Allergy Shot: One injection        Primary Care Provider Office Phone # Fax #    [facility] [place] [phone] [address] [facility] [address]        Equal Access to Services     EDUIN PENNINGTON AH: Hadii aad ku hadasho Soxeniaali, waaxda luqadaha, qaybta kaalmada adeegyada, josiah frederickn dennis rahman . So [facility] [phone].    ATENCIÓN: Si habla español, tiene a spencer disposición servicios gratuitos de asistencia lingüística. Llame al [phone].    We comply with applicable federal civil rights laws and [place] laws. We do not discriminate on the basis of race, color, national origin, age, disability, sex, sexual orientation, or gender identity.            Thank you!     Thank you for choosing [facility]  for your care. Our goal is always to provide you with excellent care. Hearing back from our patients is one way we can continue to improve our services. Please take a few minutes to complete the written survey that you may receive in the mail after your visit with us. Thank you!             Your Updated Medication List - Protect others around you: Learn how to safely use, store  and throw away your medicines at www.disposemymeds.org.          This list is accurate as of 5/14/18  5:22 PM.  Always use your most recent med list.                   Brand Name Dispense Instructions for use Diagnosis    ADVIL 200 MG capsule   Generic drug:  ibuprofen      Take 200 mg by mouth every 4 hours as needed Reported on 4/28/2017        albuterol 108 (90 Base) MCG/ACT Inhaler    PROAIR HFA/PROVENTIL HFA/VENTOLIN HFA     Inhale 2 puffs into the lungs every 4 hours as needed for shortness of breath / dyspnea or wheezing        * ALLERGEN IMMUNOTHERAPY PRESCRIPTION     5 mL    Name of Mix: Mix #2  Tree  Birch Mix GLY 1:20 w/v, HS  0.5 ml Boxelder-Maple  Mix BHR (Boxelder Hard Red) 1:20 w/v, HS  0.5 ml [place] Mix GLY 1:20 w/v, HS 0.5 ml Oak Mix RVW GLY 1:20 w/v, HS 0.5 ml [place] Tree, Black GLY 1:20 w/v, HS 0.5 ml [place], Black GLY 1:20 w/v, HS 0.5 ml Diluent: HSA qs to 5ml    Chronic seasonal allergic rhinitis due to pollen, Non-seasonal allergic rhinitis due to animal hair and dander, Chronic allergic conjunctivitis, Need for desensitization to allergens       * ALLERGEN IMMUNOTHERAPY PRESCRIPTION     5 mL    Name of Mix: Mix #1  Cat, Dog Cat Hair, Standardized 10,000 BAU/mL, ALK  2.0 ml Dog Hair Dander, A. P.  1:100 w/v, HS  1.0 ml  Diluent: HSA qs to 5ml    Non-seasonal allergic rhinitis due to animal hair and dander, Chronic allergic conjunctivitis, Need for desensitization to allergens       EPINEPHrine 0.3 MG/0.3ML injection 2-pack    AUVI-Q    0.6 mL    Inject 0.3 mLs (0.3 mg) into the muscle as needed for anaphylaxis    Seasonal allergic rhinitis due to pollen, Non-seasonal allergic rhinitis due to animal hair and dander       fluticasone 50 MCG/ACT spray    FLONASE    1 Bottle    Spray 2 sprays into both nostrils daily    Atopic rhinitis       * Notice:  This list has 2 medication(s) that are the same as other medications prescribed for you. Read the directions carefully, and ask your doctor or  other care provider to review them with you.

## 2018-05-18 ENCOUNTER — ALLIED HEALTH/NURSE VISIT (OUTPATIENT)
Dept: ALLERGY | Facility: CLINIC | Age: 28
End: 2018-05-18
Payer: COMMERCIAL

## 2018-05-18 DIAGNOSIS — J30.9 ALLERGIC RHINITIS: Primary | ICD-10-CM

## 2018-05-18 DIAGNOSIS — J30.81 NON-SEASONAL ALLERGIC RHINITIS DUE TO ANIMAL HAIR AND DANDER: ICD-10-CM

## 2018-05-18 DIAGNOSIS — J30.1 SEASONAL ALLERGIC RHINITIS DUE TO POLLEN: ICD-10-CM

## 2018-05-18 PROCEDURE — 99207 ZZC DROP WITH A PROCEDURE: CPT

## 2018-05-18 PROCEDURE — 95115 IMMUNOTHERAPY ONE INJECTION: CPT

## 2018-05-18 RX ORDER — EPINEPHRINE 0.3 MG/.3ML
0.3 INJECTION SUBCUTANEOUS PRN
Qty: 2 ML | Refills: 1 | Status: SHIPPED | OUTPATIENT
Start: 2018-05-18 | End: 2020-02-21

## 2018-05-18 NOTE — PROGRESS NOTES
Patient came with  Auvi-Q injector.  Discussed with Dr. Alegre.  Will give him a one-time pass today.  Sent new Rx for Auvi-Q to [facility] pharmacy and gave patient their card with phone # to set up delivery asap so he can have a new injector for his next injection.  Patient states understanding.  Kriss Hamilton RN

## 2018-05-18 NOTE — MR AVS SNAPSHOT
"              After Visit Summary   2018    Dmitri Mcguire    MRN: [id number]           Patient Information     Date Of Birth          1990        Visit Information        Provider Department      2018 11:15 AM ALLERGY [facility]        Today's Diagnoses     Allergic rhinitis    -  1    Seasonal allergic rhinitis due to pollen        Non-seasonal allergic rhinitis due to animal hair and dander           Follow-ups after your visit        Who to contact     If you have questions or need follow up information about today's clinic visit or your schedule please contact [facility] directly at [phone].  Normal or non-critical lab and imaging results will be communicated to you by Guru Technologieshart, letter or phone within 4 business days after the clinic has received the results. If you do not hear from us within 7 days, please contact the clinic through Guru Technologieshart or phone. If you have a critical or abnormal lab result, we will notify you by phone as soon as possible.  Submit refill requests through Moove In or call your pharmacy and they will forward the refill request to us. Please allow 3 business days for your refill to be completed.          Additional Information About Your Visit        MyChart Information     Moove In lets you send messages to your doctor, view your test results, renew your prescriptions, schedule appointments and more. To sign up, go to [email or web address] . Click on \"Log in\" on the left side of the screen, which will take you to the Welcome page. Then click on \"Sign up Now\" on the right side of the page.     You will be asked to enter the access code listed below, as well as some personal information. Please follow the directions to create your username and password.     Your access code is: [email or web address]  Expires: 2018  5:05 PM     Your access code will  in 90 days. If you need help or a new code, please call your [facility] or " [phone].        Care EveryWhere ID     This is your Care EveryWhere ID. This could be used by other organizations to access your [place] medical records  [id number]         Blood Pressure from Last 3 Encounters:   04/02/18 138/87   09/29/17 117/78   06/01/17 124/78    Weight from Last 3 Encounters:   04/02/18 77.6 kg (171 lb 1.2 oz)   09/29/17 77 kg (169 lb 12.1 oz)   06/01/17 76.6 kg (168 lb 14.4 oz)              We Performed the Following     Allergy Shot: One injection          Where to get your medicines      These medications were sent to [facility], [facility] [address], [facility] [address]     Phone:  [phone]     EPINEPHrine 0.3 MG/0.3ML injection 2-pack          Primary Care Provider Office Phone # Fax #    [facility] [phone] [address] [facility] [address]        Equal Access to Services     [facility] Kennedy Medical CenterESTEFANIA : Hadii aad ku hadasho Soomaali, waaxda luqadaha, qaybta kaalmada adeegyada, waxkulwant rahman . So [facility] [phone].    ATENCIÓN: Si habla español, tiene a spencer disposición servicios gratuitos de asistencia lingüística. Llame al [phone].    We comply with applicable federal civil rights laws and [place] laws. We do not discriminate on the basis of race, color, national origin, age, disability, sex, sexual orientation, or gender identity.            Thank you!     Thank you for choosing [facility]  for your care. Our goal is always to provide you with excellent care. Hearing back from our patients is one way we can continue to improve our services. Please take a few minutes to complete the written survey that you may receive in the mail after your visit with us. Thank you!             Your Updated Medication List - Protect others around you: Learn how to safely use, store and throw away your medicines at www.disposemymeds.org.          This list is  accurate as of 5/18/18 12:00 PM.  Always use your most recent med list.                   Brand Name Dispense Instructions for use Diagnosis    ADVIL 200 MG capsule   Generic drug:  ibuprofen      Take 200 mg by mouth every 4 hours as needed Reported on 4/28/2017        albuterol 108 (90 Base) MCG/ACT Inhaler    PROAIR HFA/PROVENTIL HFA/VENTOLIN HFA     Inhale 2 puffs into the lungs every 4 hours as needed for shortness of breath / dyspnea or wheezing        * ALLERGEN IMMUNOTHERAPY PRESCRIPTION     5 mL    Name of Mix: Mix #2  Tree  Birch Mix GLY 1:20 w/v, HS  0.5 ml Boxelder-Maple  Mix BHR (Boxelder Hard Red) 1:20 w/v, HS  0.5 ml [place] Mix GLY 1:20 w/v, HS 0.5 ml Oak Mix RVW GLY 1:20 w/v, HS 0.5 ml [place] Tree, Black GLY 1:20 w/v, HS 0.5 ml [place], Black GLY 1:20 w/v, HS 0.5 ml Diluent: HSA qs to 5ml    Chronic seasonal allergic rhinitis due to pollen, Non-seasonal allergic rhinitis due to animal hair and dander, Chronic allergic conjunctivitis, Need for desensitization to allergens       * ALLERGEN IMMUNOTHERAPY PRESCRIPTION     5 mL    Name of Mix: Mix #1  Cat, Dog Cat Hair, Standardized 10,000 BAU/mL, ALK  2.0 ml Dog Hair Dander, A. P.  1:100 w/v, HS  1.0 ml  Diluent: HSA qs to 5ml    Non-seasonal allergic rhinitis due to animal hair and dander, Chronic allergic conjunctivitis, Need for desensitization to allergens       EPINEPHrine 0.3 MG/0.3ML injection 2-pack    AUVI-Q    2 mL    Inject 0.3 mLs (0.3 mg) into the muscle as needed for anaphylaxis    Seasonal allergic rhinitis due to pollen, Non-seasonal allergic rhinitis due to animal hair and dander       fluticasone 50 MCG/ACT spray    FLONASE    1 Bottle    Spray 2 sprays into both nostrils daily    Atopic rhinitis       * Notice:  This list has 2 medication(s) that are the same as other medications prescribed for you. Read the directions carefully, and ask your doctor or other care provider to review them with you.

## 2018-05-25 ENCOUNTER — ALLIED HEALTH/NURSE VISIT (OUTPATIENT)
Dept: ALLERGY | Facility: CLINIC | Age: 28
End: 2018-05-25
Payer: COMMERCIAL

## 2018-05-25 DIAGNOSIS — J30.9 ALLERGIC RHINITIS: Primary | ICD-10-CM

## 2018-05-25 DIAGNOSIS — Z51.6 NEED FOR DESENSITIZATION TO ALLERGENS: ICD-10-CM

## 2018-05-25 DIAGNOSIS — J30.1 CHRONIC SEASONAL ALLERGIC RHINITIS DUE TO POLLEN: ICD-10-CM

## 2018-05-25 PROCEDURE — 99207 ZZC DROP WITH A PROCEDURE: CPT

## 2018-05-25 PROCEDURE — 95117 IMMUNOTHERAPY INJECTIONS: CPT

## 2018-05-25 NOTE — MR AVS SNAPSHOT
After Visit Summary   5/25/2018    Dmitri Mcguire    MRN: [id number]           Patient Information     Date Of Birth          1990        Visit Information        Provider Department      5/25/2018 11:00 AM ALLERGY [facility]        Today's Diagnoses     Allergic rhinitis    -  1       Follow-ups after your visit        Your next 10 appointments already scheduled     Jun 01, 2018 11:15 AM CDT   Nurse Only with ALLERGY [facility] ([facility])    [address] [place] [address]   [phone]           Every allergy patient MUST wait 30 minutes after their allergy shot. No exceptions.  Xolair shots #1-3 should plan to wait 2 hours in clinic Xolair shots after #4 should plan 30 minute wait in clinic            Jun 08, 2018 11:00 AM CDT   Nurse Only with ALLERGY [facility] ([facility])    [address] [place] [address]   [phone]           Every allergy patient MUST wait 30 minutes after their allergy shot. No exceptions.  Xolair shots #1-3 should plan to wait 2 hours in clinic Xolair shots after #4 should plan 30 minute wait in clinic            Gibran 15, 2018 11:00 AM CDT   Nurse Only with ALLERGY [facility] ([facility])    [address] [place] [address]   [phone]           Every allergy patient MUST wait 30 minutes after their allergy shot. No exceptions.  Xolair shots #1-3 should plan to wait 2 hours in clinic Xolair shots after #4 should plan 30 minute wait in clinic            Jun 22, 2018 11:00 AM CDT   Nurse Only with ALLERGY [facility] ([facility])    [address] [place] [address]   [phone]           Every allergy patient MUST wait 30 minutes after their allergy shot. No exceptions.  Xolair shots #1-3 should plan to wait 2  "hours in clinic Xolair shots after #4 should plan 30 minute wait in clinic            2018 11:00 AM CDT   Nurse Only with ALLERGY [facility] ([facility])    [address] [address]   [phone]           Every allergy patient MUST wait 30 minutes after their allergy shot. No exceptions.  Xolair shots #1-3 should plan to wait 2 hours in clinic Xolair shots after #4 should plan 30 minute wait in clinic              Who to contact     If you have questions or need follow up information about today's clinic visit or your schedule please contact [facility] directly at [phone].  Normal or non-critical lab and imaging results will be communicated to you by aaTaghart, letter or phone within 4 business days after the clinic has received the results. If you do not hear from us within 7 days, please contact the clinic through aaTaghart or phone. If you have a critical or abnormal lab result, we will notify you by phone as soon as possible.  Submit refill requests through GreenLight or call your pharmacy and they will forward the refill request to us. Please allow 3 business days for your refill to be completed.          Additional Information About Your Visit        GreenLight Information     GreenLight lets you send messages to your doctor, view your test results, renew your prescriptions, schedule appointments and more. To sign up, go to [email or web address] . Click on \"Log in\" on the left side of the screen, which will take you to the Welcome page. Then click on \"Sign up Now\" on the right side of the page.     You will be asked to enter the access code listed below, as well as some personal information. Please follow the directions to create your username and password.     Your access code is: [email or web address]  Expires: 2018  5:05 PM     Your access code will  in 90 days. If you need help or a new code, please call your [facility] " clinic or [phone].        Care EveryWhere ID     This is your Care EveryWhere ID. This could be used by other organizations to access your [place] medical records  [id number]         Blood Pressure from Last 3 Encounters:   04/02/18 138/87   09/29/17 117/78   06/01/17 124/78    Weight from Last 3 Encounters:   04/02/18 77.6 kg (171 lb 1.2 oz)   09/29/17 77 kg (169 lb 12.1 oz)   06/01/17 76.6 kg (168 lb 14.4 oz)              We Performed the Following     Allergy Shot: Two or more injections        Primary Care Provider Office Phone # Fax #    [facility] [phone] [address] [address]        Equal Access to Services     EDUIN PENNINGTON : Hadii aad ku hadasho Sonikko, waaxda luqadaha, qaybta kaalmada adeegyada, josiah valentine. So [facility] [place] [phone].    ATENCIÓN: Si habla español, tiene a spencer disposición servicios gratuitos de asistencia lingüística. Mushtaq al [phone].    We comply with applicable federal civil rights laws and [place] laws. We do not discriminate on the basis of race, color, national origin, age, disability, sex, sexual orientation, or gender identity.            Thank you!     Thank you for choosing [facility]  for your care. Our goal is always to provide you with excellent care. Hearing back from our patients is one way we can continue to improve our services. Please take a few minutes to complete the written survey that you may receive in the mail after your visit with us. Thank you!             Your Updated Medication List - Protect others around you: Learn how to safely use, store and throw away your medicines at www.disposemymeds.org.          This list is accurate as of 5/25/18 11:49 AM.  Always use your most recent med list.                   Brand Name Dispense Instructions for use Diagnosis    ADVIL 200 MG capsule   Generic drug:  ibuprofen      Take 200 mg by mouth every 4 hours as  needed Reported on 4/28/2017        albuterol 108 (90 Base) MCG/ACT Inhaler    PROAIR HFA/PROVENTIL HFA/VENTOLIN HFA     Inhale 2 puffs into the lungs every 4 hours as needed for shortness of breath / dyspnea or wheezing        * ALLERGEN IMMUNOTHERAPY PRESCRIPTION     5 mL    Name of Mix: Mix #2  Tree  Birch Mix GLY 1:20 w/v, HS  0.5 ml Boxelder-Maple  Mix BHR (Boxelder Hard Red) 1:20 w/v, HS  0.5 ml [place] Mix GLY 1:20 w/v, HS 0.5 ml Oak Mix RVW GLY 1:20 w/v, HS 0.5 ml [place] Tree, Black GLY 1:20 w/v, HS 0.5 ml [place], Black GLY 1:20 w/v, HS 0.5 ml Diluent: HSA qs to 5ml    Chronic seasonal allergic rhinitis due to pollen, Non-seasonal allergic rhinitis due to animal hair and dander, Chronic allergic conjunctivitis, Need for desensitization to allergens       * ALLERGEN IMMUNOTHERAPY PRESCRIPTION     5 mL    Name of Mix: Mix #1  Cat, Dog Cat Hair, Standardized 10,000 BAU/mL, ALK  2.0 ml Dog Hair Dander, A. P.  1:100 w/v, HS  1.0 ml  Diluent: HSA qs to 5ml    Non-seasonal allergic rhinitis due to animal hair and dander, Chronic allergic conjunctivitis, Need for desensitization to allergens       EPINEPHrine 0.3 MG/0.3ML injection 2-pack    AUVI-Q    2 mL    Inject 0.3 mLs (0.3 mg) into the muscle as needed for anaphylaxis    Seasonal allergic rhinitis due to pollen, Non-seasonal allergic rhinitis due to animal hair and dander       fluticasone 50 MCG/ACT spray    FLONASE    1 Bottle    Spray 2 sprays into both nostrils daily    Atopic rhinitis       * Notice:  This list has 2 medication(s) that are the same as other medications prescribed for you. Read the directions carefully, and ask your doctor or other care provider to review them with you.

## 2018-05-25 NOTE — TELEPHONE ENCOUNTER
ALLERGY SOLUTION RE-ORDER REQUEST    Dmitri Mcguire 1990 MRN: [id number]    DATE NEEDED:  06/05/18  Vial Color Content   Top Dose   Last Dose Vial Size  Red 1:1 Trees   Red 1:1 0.5   Red 1:10.5 5mL                                       Serum reorder consent signed and patient/parent was advised that new serums would be ordered through the pharmacy and billed to their insurance company when they arrive in clinic. Yes    Shot Clinic Location:  [place]  Ship to Location: [place]  Special Instructions:          Updated Prescription Needed: Yes      Requester Signature  [place]

## 2018-06-01 DIAGNOSIS — J30.2 SEASONAL ALLERGIC RHINITIS: Primary | ICD-10-CM

## 2018-06-01 PROCEDURE — 95165 ANTIGEN THERAPY SERVICES: CPT | Performed by: ALLERGY & IMMUNOLOGY

## 2018-06-01 NOTE — TELEPHONE ENCOUNTER
Allergy serums received at [place].     Vials received below:    Vial Color Content                      Vial Size Expiration Date  Red 1:1 Trees 5mL  05/31/19           Signature  [place]

## 2018-06-01 NOTE — PROGRESS NOTES
Allergy serums billed at [place].     Vials received below:    Vial Color Content                      Vial Size Expiration Date  Red 1:1 Trees 5mL  05/31/19    Original Refill encounter date: 05/25/18      Signature  [place]

## 2018-06-08 ENCOUNTER — ALLIED HEALTH/NURSE VISIT (OUTPATIENT)
Dept: ALLERGY | Facility: CLINIC | Age: 28
End: 2018-06-08
Payer: COMMERCIAL

## 2018-06-08 DIAGNOSIS — J30.9 ALLERGIC RHINITIS: Primary | ICD-10-CM

## 2018-06-08 PROCEDURE — 99207 ZZC DROP WITH A PROCEDURE: CPT

## 2018-06-08 PROCEDURE — 95115 IMMUNOTHERAPY ONE INJECTION: CPT

## 2018-06-08 NOTE — MR AVS SNAPSHOT
After Visit Summary   6/8/2018    Dmitri Mcguire    MRN: [id number]           Patient Information     Date Of Birth          1990        Visit Information        Provider Department      6/8/2018 11:00 AM ALLERGY [facility]        Today's Diagnoses     Allergic rhinitis    -  1       Follow-ups after your visit        Your next 10 appointments already scheduled     Gibran 15, 2018 11:00 AM CDT   Nurse Only with ALLERGY [facility] ([facility])    [address] [address]   [phone]           Every allergy patient MUST wait 30 minutes after their allergy shot. No exceptions.  Xolair shots #1-3 should plan to wait 2 hours in clinic Xolair shots after #4 should plan 30 minute wait in clinic            Jun 22, 2018 11:00 AM CDT   Nurse Only with ALLERGY [facility] ([facility])    [address] [address]   [phone]           Every allergy patient MUST wait 30 minutes after their allergy shot. No exceptions.  Xolair shots #1-3 should plan to wait 2 hours in clinic Xolair shots after #4 should plan 30 minute wait in clinic            Jun 29, 2018 11:00 AM CDT   Nurse Only with ALLERGY [facility] ([facility])    [address] [address]   [phone]           Every allergy patient MUST wait 30 minutes after their allergy shot. No exceptions.  Xolair shots #1-3 should plan to wait 2 hours in clinic Xolair shots after #4 should plan 30 minute wait in clinic              Who to contact     If you have questions or need follow up information about today's clinic visit or your schedule please contact [facility] directly at [phone].  Normal or non-critical lab and imaging results will be communicated to you by MyChart, letter or phone within 4 business days after the  clinic has received the results. If you do not hear from us within 7 days, please contact the clinic through TuTanda or phone. If you have a critical or abnormal lab result, we will notify you by phone as soon as possible.  Submit refill requests through TuTanda or call your pharmacy and they will forward the refill request to us. Please allow 3 business days for your refill to be completed.          Additional Information About Your Visit        Care EveryWhere ID     This is your Care EveryWhere ID. This could be used by other organizations to access your [place] medical records  [id number]         Blood Pressure from Last 3 Encounters:   04/02/18 138/87   09/29/17 117/78   06/01/17 124/78    Weight from Last 3 Encounters:   04/02/18 77.6 kg (171 lb 1.2 oz)   09/29/17 77 kg (169 lb 12.1 oz)   06/01/17 76.6 kg (168 lb 14.4 oz)              We Performed the Following     Allergy Shot: One injection        Primary Care Provider Office Phone # Fax #    [facility] [phone] [address] [address]        Equal Access to Services     EDUIN PENNINGTON : Hadii aad ku hadasho Soxeniaali, waaxda luqadaha, qaybta kaalmada adeegyada, josiah rahman . So [facility] [phone].    ATENCIÓN: Si habla español, tiene a spencer disposición servicios gratuitos de asistencia lingüística. Llame al [phone].    We comply with applicable federal civil rights laws and [place] laws. We do not discriminate on the basis of race, color, national origin, age, disability, sex, sexual orientation, or gender identity.            Thank you!     Thank you for choosing [facility]  for your care. Our goal is always to provide you with excellent care. Hearing back from our patients is one way we can continue to improve our services. Please take a few minutes to complete the written survey that you may receive in the mail after your visit with us. Thank you!              Your Updated Medication List - Protect others around you: Learn how to safely use, store and throw away your medicines at www.disposemymeds.org.          This list is accurate as of 6/8/18 11:52 AM.  Always use your most recent med list.                   Brand Name Dispense Instructions for use Diagnosis    ADVIL 200 MG capsule   Generic drug:  ibuprofen      Take 200 mg by mouth every 4 hours as needed Reported on 4/28/2017        albuterol 108 (90 Base) MCG/ACT Inhaler    PROAIR HFA/PROVENTIL HFA/VENTOLIN HFA     Inhale 2 puffs into the lungs every 4 hours as needed for shortness of breath / dyspnea or wheezing        ALLERGEN IMMUNOTHERAPY PRESCRIPTION     5 mL    Name of Mix: Mix #1  Cat, Dog Cat Hair, Standardized 10,000 BAU/mL, ALK  2.0 ml Dog Hair Dander, A. P.  1:100 w/v, HS  1.0 ml  Diluent: HSA qs to 5ml    Non-seasonal allergic rhinitis due to animal hair and dander, Chronic allergic conjunctivitis, Need for desensitization to allergens       ALLERGEN IMMUNOTHERAPY PRESCRIPTION     5 mL    Name of Mix: Mix #2  Tree  Birch Mix PRW 1:20 w/v, HS  0.5 ml Boxelder-Maple Mix BHR (Boxelder Hard Red) 1:20 w/v, HS  0.5 ml [place] Mix RW 1:20 w/v, HS 0.5 ml Oak Mix RVW 1:20 w/v, HS 0.5 ml [place] Tree, Black 1:20 w/v, HS 0.5 ml [place], Black 1:20 w/v, HS 0.5 ml Diluent: HSA qs to 5ml    Chronic seasonal allergic rhinitis due to pollen, Need for desensitization to allergens       EPINEPHrine 0.3 MG/0.3ML injection 2-pack    AUVI-Q    2 mL    Inject 0.3 mLs (0.3 mg) into the muscle as needed for anaphylaxis    Seasonal allergic rhinitis due to pollen, Non-seasonal allergic rhinitis due to animal hair and dander       fluticasone 50 MCG/ACT spray    FLONASE    1 Bottle    Spray 2 sprays into both nostrils daily    Atopic rhinitis

## 2018-06-08 NOTE — PROGRESS NOTES
Patient presented after waiting 30 minutes with no reaction to  injections. Discharged from clinic.    Mariana VIRAMONTES RN   Specialty Clinics

## 2018-06-15 ENCOUNTER — ALLIED HEALTH/NURSE VISIT (OUTPATIENT)
Dept: ALLERGY | Facility: CLINIC | Age: 28
End: 2018-06-15
Payer: COMMERCIAL

## 2018-06-15 DIAGNOSIS — J30.9 ALLERGIC RHINITIS: Primary | ICD-10-CM

## 2018-06-15 PROCEDURE — 99207 ZZC DROP WITH A PROCEDURE: CPT

## 2018-06-15 PROCEDURE — 95115 IMMUNOTHERAPY ONE INJECTION: CPT

## 2018-06-15 NOTE — MR AVS SNAPSHOT
After Visit Summary   6/15/2018    Dmitri Mcguire    MRN: [id number]           Patient Information     Date Of Birth          1990        Visit Information        Provider Department      6/15/2018 2:45 PM ALLERGY [facility]        Today's Diagnoses     Allergic rhinitis    -  1       Follow-ups after your visit        Your next 10 appointments already scheduled     Jun 22, 2018  2:45 PM CDT   Nurse Only with ALLERGY [facility] ([facility])    [address] [address]   [phone]           Every allergy patient MUST wait 30 minutes after their allergy shot. No exceptions.  Xolair shots #1-3 should plan to wait 2 hours in clinic Xolair shots after #4 should plan 30 minute wait in clinic            Jun 29, 2018  2:45 PM CDT   Nurse Only with ALLERGY [facility] ([facility])    [address] [address]   [phone]           Every allergy patient MUST wait 30 minutes after their allergy shot. No exceptions.  Xolair shots #1-3 should plan to wait 2 hours in clinic Xolair shots after #4 should plan 30 minute wait in clinic              Who to contact     If you have questions or need follow up information about today's clinic visit or your schedule please contact [facility] directly at [phone].  Normal or non-critical lab and imaging results will be communicated to you by MyChart, letter or phone within 4 business days after the clinic has received the results. If you do not hear from us within 7 days, please contact the clinic through MyChart or phone. If you have a critical or abnormal lab result, we will notify you by phone as soon as possible.  Submit refill requests through [email or web address] or call your pharmacy and they will forward the refill request to us. Please allow 3 business days for your refill to be completed.           Additional Information About Your Visit        Care EveryWhere ID     This is your Care EveryWhere ID. This could be used by other organizations to access your [place] medical records  [id number]         Blood Pressure from Last 3 Encounters:   04/02/18 138/87   09/29/17 117/78   06/01/17 124/78    Weight from Last 3 Encounters:   04/02/18 77.6 kg (171 lb 1.2 oz)   09/29/17 77 kg (169 lb 12.1 oz)   06/01/17 76.6 kg (168 lb 14.4 oz)              We Performed the Following     Allergy Shot: One injection        Primary Care Provider Office Phone # Fax #    [facility] [phone] [address] [facility] [address]        Equal Access to Services     EDUIN PENNINGTON : Hadii aurelio stonero Sonikko, waaxda luqadaha, qaybta kaalmada adeegyada, josiah rahman . So [facility] [phone].    ATENCIÓN: Si habla español, tiene a spencer disposición servicios gratuitos de asistencia lingüística. Llame al [phone].    We comply with applicable federal civil rights laws and [place] laws. We do not discriminate on the basis of race, color, national origin, age, disability, sex, sexual orientation, or gender identity.            Thank you!     Thank you for choosing [facility]  for your care. Our goal is always to provide you with excellent care. Hearing back from our patients is one way we can continue to improve our services. Please take a few minutes to complete the written survey that you may receive in the mail after your visit with us. Thank you!             Your Updated Medication List - Protect others around you: Learn how to safely use, store and throw away your medicines at www.disposemymeds.org.          This list is accurate as of 6/15/18  3:44 PM.  Always use your most recent med list.                   Brand Name Dispense Instructions for use Diagnosis    ADVIL 200 MG capsule   Generic drug:  ibuprofen      Take 200 mg by mouth every 4  hours as needed Reported on 4/28/2017        albuterol 108 (90 Base) MCG/ACT Inhaler    PROAIR HFA/PROVENTIL HFA/VENTOLIN HFA     Inhale 2 puffs into the lungs every 4 hours as needed for shortness of breath / dyspnea or wheezing        ALLERGEN IMMUNOTHERAPY PRESCRIPTION     5 mL    Name of Mix: Mix #1  Cat, Dog Cat Hair, Standardized 10,000 BAU/mL, ALK  2.0 ml Dog Hair Dander, A. P.  1:100 w/v, HS  1.0 ml  Diluent: HSA qs to 5ml    Non-seasonal allergic rhinitis due to animal hair and dander, Chronic allergic conjunctivitis, Need for desensitization to allergens       ALLERGEN IMMUNOTHERAPY PRESCRIPTION     5 mL    Name of Mix: Mix #2  Tree  Birch Mix PRW 1:20 w/v, HS  0.5 ml Boxelder-Maple Mix BHR (Boxelder Hard Red) 1:20 w/v, HS  0.5 ml [place] Mix RW 1:20 w/v, HS 0.5 ml Oak Mix RVW 1:20 w/v, HS 0.5 ml [place] Tree, Black 1:20 w/v, HS 0.5 ml [place], Black 1:20 w/v, HS 0.5 ml Diluent: HSA qs to 5ml    Chronic seasonal allergic rhinitis due to pollen, Need for desensitization to allergens       EPINEPHrine 0.3 MG/0.3ML injection 2-pack    AUVI-Q    2 mL    Inject 0.3 mLs (0.3 mg) into the muscle as needed for anaphylaxis    Seasonal allergic rhinitis due to pollen, Non-seasonal allergic rhinitis due to animal hair and dander       fluticasone 50 MCG/ACT spray    FLONASE    1 Bottle    Spray 2 sprays into both nostrils daily    Atopic rhinitis

## 2018-06-22 ENCOUNTER — ALLIED HEALTH/NURSE VISIT (OUTPATIENT)
Dept: ALLERGY | Facility: CLINIC | Age: 28
End: 2018-06-22
Payer: COMMERCIAL

## 2018-06-22 DIAGNOSIS — J30.9 ALLERGIC RHINITIS: Primary | ICD-10-CM

## 2018-06-22 PROCEDURE — 99207 ZZC DROP WITH A PROCEDURE: CPT

## 2018-06-22 PROCEDURE — 95117 IMMUNOTHERAPY INJECTIONS: CPT

## 2018-06-22 NOTE — MR AVS SNAPSHOT
After Visit Summary   6/22/2018    Dmitri Mcguire    MRN: [id number]           Patient Information     Date Of Birth          1990        Visit Information        Provider Department      6/22/2018 2:45 PM ALLERGY [facility]        Today's Diagnoses     Allergic rhinitis    -  1       Follow-ups after your visit        Your next 10 appointments already scheduled     Jun 29, 2018  2:45 PM CDT   Nurse Only with ALLERGY [facility] ([facility])    [address] [address] [phone]           Every allergy patient MUST wait 30 minutes after their allergy shot. No exceptions.  Xolair shots #1-3 should plan to wait 2 hours in clinic Xolair shots after #4 should plan 30 minute wait in clinic              Who to contact     If you have questions or need follow up information about today's clinic visit or your schedule please contact [facility] directly at [phone].  Normal or non-critical lab and imaging results will be communicated to you by [employer], letter or phone within 4 business days after the clinic has received the results. If you do not hear from us within 7 days, please contact the clinic through [employer] or phone. If you have a critical or abnormal lab result, we will notify you by phone as soon as possible.  Submit refill requests through [email or web address] or call your pharmacy and they will forward the refill request to us. Please allow 3 business days for your refill to be completed.          Additional Information About Your Visit        Care EveryWhere ID     This is your Care EveryWhere ID. This could be used by other organizations to access your [employer] medical records  [id number]         Blood Pressure from Last 3 Encounters:   04/02/18 138/87   09/29/17 117/78   06/01/17 124/78    Weight from Last 3 Encounters:   04/02/18 77.6 kg (171 lb 1.2 oz)   09/29/17 77 kg (169 lb 12.1 oz)    06/01/17 76.6 kg (168 lb 14.4 oz)              We Performed the Following     Allergy Shot: Two or more injections        Primary Care Provider Office Phone # Fax #    [facility] [phone] [address]  [facility] [address]        Equal Access to Services     EDUIN PENNINGTON : Hadii aad ku hadasho Soomaali, waaxda luqadaha, qaybta kaalmada adeegyada, waxay idiin hayaan adeeg nirsh la'aan . So [facility] [phone].    ATENCIÓN: Si habla español, tiene a spencer disposición servicios gratuitos de asistencia lingüística. Kirstiname al [phone].    We comply with applicable federal civil rights laws and [place] laws. We do not discriminate on the basis of race, color, national origin, age, disability, sex, sexual orientation, or gender identity.            Thank you!     Thank you for choosing [facility]  for your care. Our goal is always to provide you with excellent care. Hearing back from our patients is one way we can continue to improve our services. Please take a few minutes to complete the written survey that you may receive in the mail after your visit with us. Thank you!             Your Updated Medication List - Protect others around you: Learn how to safely use, store and throw away your medicines at www.disposemymeds.org.          This list is accurate as of 6/22/18  3:37 PM.  Always use your most recent med list.                   Brand Name Dispense Instructions for use Diagnosis    ADVIL 200 MG capsule   Generic drug:  ibuprofen      Take 200 mg by mouth every 4 hours as needed Reported on 4/28/2017        albuterol 108 (90 Base) MCG/ACT Inhaler    PROAIR HFA/PROVENTIL HFA/VENTOLIN HFA     Inhale 2 puffs into the lungs every 4 hours as needed for shortness of breath / dyspnea or wheezing        ALLERGEN IMMUNOTHERAPY PRESCRIPTION     5 mL    Name of Mix: Mix #1  Cat, Dog Cat Hair, Standardized 10,000 BAU/mL, ALK  2.0 ml Dog Hair Dander, A. P.  1:100  w/v, HS  1.0 ml  Diluent: HSA qs to 5ml    Non-seasonal allergic rhinitis due to animal hair and dander, Chronic allergic conjunctivitis, Need for desensitization to allergens       ALLERGEN IMMUNOTHERAPY PRESCRIPTION     5 mL    Name of Mix: Mix #2  Tree  Birch Mix PRW 1:20 w/v, HS  0.5 ml Boxelder-Maple Mix BHR (Boxelder Hard Red) 1:20 w/v, HS  0.5 ml [place] Mix RW 1:20 w/v, HS 0.5 ml Oak Mix RVW 1:20 w/v, HS 0.5 ml [place] Tree, Black 1:20 w/v, HS 0.5 ml [place], Black 1:20 w/v, HS 0.5 ml Diluent: HSA qs to 5ml    Chronic seasonal allergic rhinitis due to pollen, Need for desensitization to allergens       EPINEPHrine 0.3 MG/0.3ML injection 2-pack    AUVI-Q    2 mL    Inject 0.3 mLs (0.3 mg) into the muscle as needed for anaphylaxis    Seasonal allergic rhinitis due to pollen, Non-seasonal allergic rhinitis due to animal hair and dander       fluticasone 50 MCG/ACT spray    FLONASE    1 Bottle    Spray 2 sprays into both nostrils daily    Atopic rhinitis

## 2018-07-05 ENCOUNTER — TELEPHONE (OUTPATIENT)
Dept: ALLERGY | Facility: CLINIC | Age: 28
End: 2018-07-05

## 2018-07-05 NOTE — TELEPHONE ENCOUNTER
Reason for Call:  Other questions    Detailed comments: Pt wants to know if he misses tomorrow injection will that be Ok? What is time frame for shots, Please call to advise  Phone Number Patient can be reached at: Home number on file [phone] (home)    Best Time: today    Can we leave a detailed message on this number? YES    Call taken on 7/5/2018 at 11:35 AM by Lanny Meyers

## 2018-07-05 NOTE — TELEPHONE ENCOUNTER
Writer called and left detailed message on patient's voice mail.  Informed patient that tomorrow would be day 14 since his last injection.  Patient needed to have his next injection within a 11-17 day window from his last shot.  If patient were to miss tomorrow's shot on day fourteen he would absolutely need to get his shot on Monday Jan 9th which would be day 17.  Jose Bueno RN

## 2018-07-09 ENCOUNTER — ALLIED HEALTH/NURSE VISIT (OUTPATIENT)
Dept: ALLERGY | Facility: CLINIC | Age: 28
End: 2018-07-09
Payer: COMMERCIAL

## 2018-07-09 DIAGNOSIS — J30.9 ALLERGIC RHINITIS: Primary | ICD-10-CM

## 2018-07-09 PROCEDURE — 95115 IMMUNOTHERAPY ONE INJECTION: CPT

## 2018-07-09 PROCEDURE — 99207 ZZC DROP WITH A PROCEDURE: CPT

## 2018-07-09 NOTE — MR AVS SNAPSHOT
After Visit Summary   7/9/2018    Dmitri Mcguire    MRN: [id number]           Patient Information     Date Of Birth          1990        Visit Information        Provider Department      7/9/2018 4:45 PM ALLERGY [facility]        Today's Diagnoses     Allergic rhinitis    -  1       Follow-ups after your visit        Your next 10 appointments already scheduled     Jul 20, 2018  2:45 PM CDT   Nurse Only with ALLERGY [facility] ([facility])    [address] [address] [phone]           Every allergy patient MUST wait 30 minutes after their allergy shot. No exceptions.  Xolair shots #1-3 should plan to wait 2 hours in clinic Xolair shots after #4 should plan 30 minute wait in clinic              Who to contact     If you have questions or need follow up information about today's clinic visit or your schedule please contact [facility] directly at [phone].  Normal or non-critical lab and imaging results will be communicated to you by [employer], letter or phone within 4 business days after the clinic has received the results. If you do not hear from us within 7 days, please contact the clinic through [employer] or phone. If you have a critical or abnormal lab result, we will notify you by phone as soon as possible.  Submit refill requests through [employer] or call your pharmacy and they will forward the refill request to us. Please allow 3 business days for your refill to be completed.          Additional Information About Your Visit        Care EveryWhere ID     This is your Care EveryWhere ID. This could be used by other organizations to access your [place] medical records  [id number]         Blood Pressure from Last 3 Encounters:   04/02/18 138/87   09/29/17 117/78   06/01/17 124/78    Weight from Last 3 Encounters:   04/02/18 77.6 kg (171 lb 1.2 oz)   09/29/17 77 kg (169 lb 12.1 oz)    06/01/17 76.6 kg (168 lb 14.4 oz)              We Performed the Following     Allergy Shot: One injection        Primary Care Provider Office Phone # Fax #    [facility] [phone] [address]  [facility] [address]        Equal Access to Services     EDUIN PENNINGTON : Hadii aad ku hadasho Soomaali, waaxda luqadaha, qaybta kaalmada adeegyada, waxay idiin hayaan adeeg leelee la'aan ah. So [facility] [phone].    ATENCIÓN: Si habla español, tiene a spencer disposición servicios gratuitos de asistencia lingüística. Llame al [phone].    We comply with applicable federal civil rights laws and [place] laws. We do not discriminate on the basis of race, color, national origin, age, disability, sex, sexual orientation, or gender identity.            Thank you!     Thank you for choosing [facility]  for your care. Our goal is always to provide you with excellent care. Hearing back from our patients is one way we can continue to improve our services. Please take a few minutes to complete the written survey that you may receive in the mail after your visit with us. Thank you!             Your Updated Medication List - Protect others around you: Learn how to safely use, store and throw away your medicines at www.disposemymeds.org.          This list is accurate as of 7/9/18  5:16 PM.  Always use your most recent med list.                   Brand Name Dispense Instructions for use Diagnosis    ADVIL 200 MG capsule   Generic drug:  ibuprofen      Take 200 mg by mouth every 4 hours as needed Reported on 4/28/2017        albuterol 108 (90 Base) MCG/ACT Inhaler    PROAIR HFA/PROVENTIL HFA/VENTOLIN HFA     Inhale 2 puffs into the lungs every 4 hours as needed for shortness of breath / dyspnea or wheezing        ALLERGEN IMMUNOTHERAPY PRESCRIPTION     5 mL    Name of Mix: Mix #1  Cat, Dog Cat Hair, Standardized 10,000 BAU/mL, ALK  2.0 ml Dog Hair Dander, A. P.  1:100 w/v, HS   1.0 ml  Diluent: HSA qs to 5ml    Non-seasonal allergic rhinitis due to animal hair and dander, Chronic allergic conjunctivitis, Need for desensitization to allergens       ALLERGEN IMMUNOTHERAPY PRESCRIPTION     5 mL    Name of Mix: Mix #2  Tree  Birch Mix PRW 1:20 w/v, HS  0.5 ml Boxelder-Maple Mix BHR (Boxelder Hard Red) 1:20 w/v, HS  0.5 ml [place] Mix RW 1:20 w/v, HS 0.5 ml Oak Mix RVW 1:20 w/v, HS 0.5 ml [place] Tree, Black 1:20 w/v, HS 0.5 ml [place], Black 1:20 w/v, HS 0.5 ml Diluent: HSA qs to 5ml    Chronic seasonal allergic rhinitis due to pollen, Need for desensitization to allergens       EPINEPHrine 0.3 MG/0.3ML injection 2-pack    AUVI-Q    2 mL    Inject 0.3 mLs (0.3 mg) into the muscle as needed for anaphylaxis    Seasonal allergic rhinitis due to pollen, Non-seasonal allergic rhinitis due to animal hair and dander       fluticasone 50 MCG/ACT spray    FLONASE    1 Bottle    Spray 2 sprays into both nostrils daily    Atopic rhinitis

## 2018-07-20 ENCOUNTER — ALLIED HEALTH/NURSE VISIT (OUTPATIENT)
Dept: ALLERGY | Facility: CLINIC | Age: 28
End: 2018-07-20
Payer: COMMERCIAL

## 2018-07-20 DIAGNOSIS — J30.9 ALLERGIC RHINITIS: Primary | ICD-10-CM

## 2018-07-20 PROCEDURE — 95117 IMMUNOTHERAPY INJECTIONS: CPT

## 2018-07-20 PROCEDURE — 99207 ZZC DROP WITH A PROCEDURE: CPT

## 2018-07-20 NOTE — MR AVS SNAPSHOT
After Visit Summary   7/20/2018    Dmitri Mcguire    MRN: [id number]           Patient Information     Date Of Birth          1990        Visit Information        Provider Department      7/20/2018 2:45 PM ALLERGY [facility]        Today's Diagnoses     Allergic rhinitis    -  1       Follow-ups after your visit        Your next 10 appointments already scheduled     Aug 17, 2018 11:00 AM CDT   Nurse Only with ALLERGY [facility] ([facility])    [address] [address]   [phone]           Every allergy patient MUST wait 30 minutes after their allergy shot. No exceptions.  Xolair shots #1-3 should plan to wait 2 hours in clinic Xolair shots after #4 should plan 30 minute wait in clinic            Aug 17, 2018 11:20 AM CDT   Return Visit with Carlo Alegre MD   [facility]    [address] [address] [phone]              Who to contact     If you have questions or need follow up information about today's clinic visit or your schedule please contact [facility] directly at [phone].  Normal or non-critical lab and imaging results will be communicated to you by MyChart, letter or phone within 4 business days after the clinic has received the results. If you do not hear from us within 7 days, please contact the clinic through MyChart or phone. If you have a critical or abnormal lab result, we will notify you by phone as soon as possible.  Submit refill requests through [employer] or call your pharmacy and they will forward the refill request to us. Please allow 3 business days for your refill to be completed.          Additional Information About Your Visit        Care EveryWhere ID     This is your Care EveryWhere ID. This could be used by other organizations to access your [place] medical records  [id number]         Blood  Pressure from Last 3 Encounters:   04/02/18 138/87   09/29/17 117/78   06/01/17 124/78    Weight from Last 3 Encounters:   04/02/18 77.6 kg (171 lb 1.2 oz)   09/29/17 77 kg (169 lb 12.1 oz)   06/01/17 76.6 kg (168 lb 14.4 oz)              We Performed the Following     Allergy Shot: Two or more injections        Primary Care Provider Office Phone # Fax #    [facility] [place] [phone] [address] [facility] [address]        Equal Access to Services     MILVIA PENNINGTON : Hadii aad shilpa hadasho Sonikko, waaxda luqadaha, qaybta kaalmada adelisayazita, josiah rahman . So [facility] [phone].    ATENCIÓN: Si habla español, tiene a spencer disposición servicios gratuitos de asistencia lingüística. Llame al [phone].    We comply with applicable federal civil rights laws and [place] laws. We do not discriminate on the basis of race, color, national origin, age, disability, sex, sexual orientation, or gender identity.            Thank you!     Thank you for choosing [facility]  for your care. Our goal is always to provide you with excellent care. Hearing back from our patients is one way we can continue to improve our services. Please take a few minutes to complete the written survey that you may receive in the mail after your visit with us. Thank you!             Your Updated Medication List - Protect others around you: Learn how to safely use, store and throw away your medicines at www.disposemymeds.org.          This list is accurate as of 7/20/18  3:24 PM.  Always use your most recent med list.                   Brand Name Dispense Instructions for use Diagnosis    ADVIL 200 MG capsule   Generic drug:  ibuprofen      Take 200 mg by mouth every 4 hours as needed Reported on 4/28/2017        albuterol 108 (90 Base) MCG/ACT Inhaler    PROAIR HFA/PROVENTIL HFA/VENTOLIN HFA     Inhale 2 puffs into the lungs every 4 hours as needed for shortness of  breath / dyspnea or wheezing        ALLERGEN IMMUNOTHERAPY PRESCRIPTION     5 mL    Name of Mix: Mix #1  Cat, Dog Cat Hair, Standardized 10,000 BAU/mL, ALK  2.0 ml Dog Hair Dander, A. P.  1:100 w/v, HS  1.0 ml  Diluent: HSA qs to 5ml    Non-seasonal allergic rhinitis due to animal hair and dander, Chronic allergic conjunctivitis, Need for desensitization to allergens       ALLERGEN IMMUNOTHERAPY PRESCRIPTION     5 mL    Name of Mix: Mix #2  Tree  Birch Mix PRW 1:20 w/v, HS  0.5 ml Boxelder-Maple Mix BHR (Boxelder Hard Red) 1:20 w/v, HS  0.5 ml [place] Mix RW 1:20 w/v, HS 0.5 ml Oak Mix RVW 1:20 w/v, HS 0.5 ml [place] Tree, Black 1:20 w/v, HS 0.5 ml [place], Black 1:20 w/v, HS 0.5 ml Diluent: HSA qs to 5ml    Chronic seasonal allergic rhinitis due to pollen, Need for desensitization to allergens       EPINEPHrine 0.3 MG/0.3ML injection 2-pack    AUVI-Q    2 mL    Inject 0.3 mLs (0.3 mg) into the muscle as needed for anaphylaxis    Seasonal allergic rhinitis due to pollen, Non-seasonal allergic rhinitis due to animal hair and dander       fluticasone 50 MCG/ACT spray    FLONASE    1 Bottle    Spray 2 sprays into both nostrils daily    Atopic rhinitis

## 2018-08-17 ENCOUNTER — ALLIED HEALTH/NURSE VISIT (OUTPATIENT)
Dept: ALLERGY | Facility: CLINIC | Age: 28
End: 2018-08-17
Payer: COMMERCIAL

## 2018-08-17 DIAGNOSIS — J30.9 ALLERGIC RHINITIS: Primary | ICD-10-CM

## 2018-08-17 DIAGNOSIS — J30.1 CHRONIC SEASONAL ALLERGIC RHINITIS DUE TO POLLEN: ICD-10-CM

## 2018-08-17 DIAGNOSIS — Z51.6 NEED FOR DESENSITIZATION TO ALLERGENS: ICD-10-CM

## 2018-08-17 DIAGNOSIS — H10.45 CHRONIC ALLERGIC CONJUNCTIVITIS: ICD-10-CM

## 2018-08-17 DIAGNOSIS — J30.81 NON-SEASONAL ALLERGIC RHINITIS DUE TO ANIMAL HAIR AND DANDER: ICD-10-CM

## 2018-08-17 PROCEDURE — 95117 IMMUNOTHERAPY INJECTIONS: CPT

## 2018-08-17 PROCEDURE — 99207 ZZC DROP WITH A PROCEDURE: CPT

## 2018-08-17 NOTE — MR AVS SNAPSHOT
After Visit Summary   8/17/2018    Dmitri Mcguire    MRN: [id number]           Patient Information     Date Of Birth          1990        Visit Information        Provider Department      8/17/2018 2:45 PM ALLERGY [facility]        Today's Diagnoses     Allergic rhinitis    -  1       Follow-ups after your visit        Your next 10 appointments already scheduled     Aug 20, 2018  4:40 PM CDT   [facility]    [address] [address] [phone]            Sep 14, 2018  2:45 PM CDT   Nurse Only with ALLERGY [facility] ([facility])    [address] [address]   [phone]           Every allergy patient MUST wait 30 minutes after their allergy shot. No exceptions.  Xolair shots #1-3 should plan to wait 2 hours in clinic Xolair shots after #4 should plan 30 minute wait in clinic              Who to contact     If you have questions or need follow up information about today's clinic visit or your schedule please contact [facility] directly at [phone].  Normal or non-critical lab and imaging results will be communicated to you by MyChart, letter or phone within 4 business days after the clinic has received the results. If you do not hear from us within 7 days, please contact the clinic through MyChart or phone. If you have a critical or abnormal lab result, we will notify you by phone as soon as possible.  Submit refill requests through [employer] or call your pharmacy and they will forward the refill request to us. Please allow 3 business days for your refill to be completed.          Additional Information About Your Visit        Care EveryWhere ID     This is your Care EveryWhere ID. This could be used by other organizations to access your [place] medical records  [id number]         Blood  Pressure from Last 3 Encounters:   04/02/18 138/87   09/29/17 117/78   06/01/17 124/78    Weight from Last 3 Encounters:   04/02/18 77.6 kg (171 lb 1.2 oz)   09/29/17 77 kg (169 lb 12.1 oz)   06/01/17 76.6 kg (168 lb 14.4 oz)              We Performed the Following     Allergy Shot: Two or more injections        Primary Care Provider Office Phone # Fax #    [facility] [phone] [address] [facility] [address]        Equal Access to Services     MILVIA PENNINGTON : Hadii aad shilpa hadasho Sonikko, waaxda luqadaha, qaybta kaalmada adelisayazita, josiah rahman . So [facility] [phone].    ATENCIÓN: Si habla español, tiene a spencer disposición servicios gratuitos de asistencia lingüística. Llame al [phone].    We comply with applicable federal civil rights laws and [place] laws. We do not discriminate on the basis of race, color, national origin, age, disability, sex, sexual orientation, or gender identity.            Thank you!     Thank you for choosing [facility]  for your care. Our goal is always to provide you with excellent care. Hearing back from our patients is one way we can continue to improve our services. Please take a few minutes to complete the written survey that you may receive in the mail after your visit with us. Thank you!             Your Updated Medication List - Protect others around you: Learn how to safely use, store and throw away your medicines at www.disposemymeds.org.          This list is accurate as of 8/17/18  3:27 PM.  Always use your most recent med list.                   Brand Name Dispense Instructions for use Diagnosis    ADVIL 200 MG capsule   Generic drug:  ibuprofen      Take 200 mg by mouth every 4 hours as needed Reported on 4/28/2017        albuterol 108 (90 Base) MCG/ACT inhaler    PROAIR HFA/PROVENTIL HFA/VENTOLIN HFA     Inhale 2 puffs into the lungs every 4 hours as needed for shortness of  breath / dyspnea or wheezing        EPINEPHrine 0.3 MG/0.3ML injection 2-pack    AUVI-Q    2 mL    Inject 0.3 mLs (0.3 mg) into the muscle as needed for anaphylaxis    Seasonal allergic rhinitis due to pollen, Non-seasonal allergic rhinitis due to animal hair and dander       fluticasone 50 MCG/ACT spray    FLONASE    1 Bottle    Spray 2 sprays into both nostrils daily    Atopic rhinitis       ORDER FOR ALLERGEN IMMUNOTHERAPY 5 mL vial     5 mL    Name of Mix: Mix #1  Cat, Dog Cat Hair, Standardized 10,000 BAU/mL, ALK  2.0 ml Dog Hair Dander, A. P.  1:100 w/v, HS  1.0 ml  Diluent: HSA qs to 5ml    Non-seasonal allergic rhinitis due to animal hair and dander, Chronic allergic conjunctivitis, Need for desensitization to allergens       ORDER FOR ALLERGEN IMMUNOTHERAPY 5 mL vial     5 mL    Name of Mix: Mix #2  Tree  Birch Mix PRW 1:20 w/v, HS  0.5 ml Boxelder-Maple Mix BHR (Boxelder Hard Red) 1:20 w/v, HS  0.5 ml [place] Mix RW 1:20 w/v, HS 0.5 ml Oak Mix RVW 1:20 w/v, HS 0.5 ml [place] Tree, Black 1:20 w/v, HS 0.5 ml [place], Black 1:20 w/v, HS 0.5 ml Diluent: HSA qs to 5ml    Chronic seasonal allergic rhinitis due to pollen, Need for desensitization to allergens

## 2018-08-17 NOTE — TELEPHONE ENCOUNTER
ALLERGY SOLUTION RE-ORDER REQUEST    Dmitri Mcguire 1990 MRN: [id number]    DATE NEEDED:  08/31/18  Vial Color Content   Top Dose   Last Dose Vial Size  Red 1:1 Cat, Dog   Red 1:1 0.5   Red 1:10.5 5mL  Red 1:1 Trees   Red 1:1 0.5   Red 1:10.5 5mL                            Serum reorder consent signed and patient/parent was advised that new serums would be ordered through the pharmacy and billed to their insurance company when they arrive in clinic. Yes    Shot Clinic Location:  [place]  Ship to Location: [place]  Serum billed to:  [place]    Special Instructions:          Updated Prescription Needed: No      Requester Signature  [place]

## 2018-08-29 DIAGNOSIS — J30.2 CHRONIC SEASONAL ALLERGIC RHINITIS DUE TO OTHER ALLERGEN: Primary | ICD-10-CM

## 2018-08-29 PROCEDURE — 95165 ANTIGEN THERAPY SERVICES: CPT | Performed by: ALLERGY & IMMUNOLOGY

## 2018-08-29 NOTE — TELEPHONE ENCOUNTER
Allergy serums received at [place].     Vials received below:    Vial Color Content                      Vial Size Expiration Date  Red 1:1 Trees 5mL  8/27/2019  Red 1:1 Cat, Dog 5mL  8/27/2019      Signature  Mary Paul

## 2018-08-29 NOTE — PROGRESS NOTES
Allergy serums billed at [place].     Vials received below:  Vial Color Content                      Vial Size Expiration Date  Red 1:1 Trees 5mL  8/27/2019  Red 1:1 Cat, Dog 5mL  8/27/2019        Original Refill encounter date: 8/17/18      Signature  Mary Paul RN

## 2018-09-06 ENCOUNTER — TELEPHONE (OUTPATIENT)
Dept: ALLERGY | Facility: CLINIC | Age: 28
End: 2018-09-06

## 2018-09-06 NOTE — LETTER
My Asthma Action Plan  Name: Dmitri Mcguire   YOB: 1990  Date: 9/9/2018   My doctor: Carlo Alegre MD   My clinic: [facility]        My Control Medicine: None  My Rescue Medicine: Albuterol (Proair/Ventolin/Proventil) inhaler 2-4 puffs every 4 hrs as needed   My Asthma Severity: intermittent  Avoid your asthma triggers: pollens and animal dander               GREEN ZONE   Good Control    I feel good    No cough or wheeze    Can work, sleep and play without asthma symptoms       Take your asthma control medicine every day.     1. If exercise triggers your asthma, take your rescue medication    15 minutes before exercise or sports, and    During exercise if you have asthma symptoms  2. Spacer to use with inhaler: If you have a spacer, make sure to use it with your inhaler             YELLOW ZONE Getting Worse  I have ANY of these:    I do not feel good    Cough or wheeze    Chest feels tight    Wake up at night   1. Keep taking your Green Zone medications  2. Start taking your rescue medicine:    every 20 minutes for up to 1 hour. Then every 4 hours for 24-48 hours.  3. If you stay in the Yellow Zone for more than 24-48 hrs hours, contact your doctor.  4.            RED ZONE Medical Alert - Get Help  I have ANY of these:    I feel awful    Medicine is not helping    Breathing getting harder    Trouble walking or talking    Nose opens wide to breathe       1. Take your rescue medicine NOW  2. If your provider has prescribed an oral steroid medicine, start taking it NOW  3. Call your doctor NOW  4. If you are still in the Red Zone after 20 minutes and you have not reached your doctor:    Take your rescue medicine again and    Call 911 or go to the emergency room right away    See your regular doctor within 2 weeks of an Emergency Room or Urgent Care visit for follow-up treatment.          Annual Reminders:  Meet with Asthma Educator,  Flu Shot in the Fall, consider Pneumonia Vaccination for  patients with asthma (aged 19 and older).    Pharmacy:    [facility] - - [place], [place] - [id number] [id number] - [place], [place] - [address] [facility] - [place], [place] - [address]  [id number] - [place], [place] - [address] [facility] [address]   [id number] - [place], [place] - [address] AT [address]  [facility] - [place], [place] - [address] [facility]                      Asthma Triggers  How To Control Things That Make Your Asthma Worse    Triggers are things that make your asthma worse.  Look at the list below to help you find your triggers and what you can do about them.  You can help prevent asthma flare-ups by staying away from your triggers.      Trigger                                                          What you can do   Cigarette Smoke  Tobacco smoke can make asthma worse. Do not allow smoking in your home, car or around you.  Be sure no one smokes at a child s day care or school.  If you smoke, ask your health care provider for ways to help you quit.  Ask family members to quit too.  Ask your health care provider for a referral to Quit Plan to help you quit smoking, or call 4-310-017-PLAN.     Colds, Flu, Bronchitis  These are common triggers of asthma. Wash your hands often.  Don t touch your eyes, nose or mouth.  Get a flu shot every year.     Dust Mites  These are tiny bugs that live in cloth or carpet. They are too small to see. Wash sheets and blankets in hot water every week.   Encase pillows and mattress in dust mite proof covers.  Avoid having carpet if you can. If you have carpet, vacuum weekly.   Use a dust mask and HEPA vacuum.   Pollen and Outdoor Mold  Some people are allergic to trees, grass, or weed pollen, or molds. Try to keep your windows closed.  Limit time out doors when pollen count is high.   Ask you health care  provider about taking medicine during allergy season.     Animal Dander  Some people are allergic to skin flakes, urine or saliva from pets with fur or feathers. Keep pets with fur or feathers out of your home.    If you can t keep the pet outdoors, then keep the pet out of your bedroom.  Keep the bedroom door closed.  Keep pets off cloth furniture and away from stuffed toys.     Mice, Rats, and Cockroaches  Some people are allergic to the waste from these pests.   Cover food and garbage.  Clean up spills and food crumbs.  Store grease in the refrigerator.   Keep food out of the bedroom.   Indoor Mold  This can be a trigger if your home has high moisture. Fix leaking faucets, pipes, or other sources of water.   Clean moldy surfaces.  Dehumidify basement if it is damp and smelly.   Smoke, Strong Odors, and Sprays  These can reduce air quality. Stay away from strong odors and sprays, such as perfume, powder, hair spray, paints, smoke incense, paint, cleaning products, candles and new carpet.   Exercise or Sports  Some people with asthma have this trigger. Be active!  Ask your doctor about taking medicine before sports or exercise to prevent symptoms.    Warm up for 5-10 minutes before and after sports or exercise.     Other Triggers of Asthma  Cold air:  Cover your nose and mouth with a scarf.  Sometimes laughing or crying can be a trigger.  Some medicines and food can trigger asthma.

## 2018-09-14 ENCOUNTER — ALLIED HEALTH/NURSE VISIT (OUTPATIENT)
Dept: ALLERGY | Facility: CLINIC | Age: 28
End: 2018-09-14
Payer: COMMERCIAL

## 2018-09-14 ENCOUNTER — OFFICE VISIT (OUTPATIENT)
Dept: ALLERGY | Facility: CLINIC | Age: 28
End: 2018-09-14
Payer: COMMERCIAL

## 2018-09-14 VITALS
SYSTOLIC BLOOD PRESSURE: 132 MMHG | DIASTOLIC BLOOD PRESSURE: 86 MMHG | WEIGHT: 176.37 LBS | OXYGEN SATURATION: 97 % | BODY MASS INDEX: 27.36 KG/M2 | TEMPERATURE: 97.6 F | HEART RATE: 66 BPM | RESPIRATION RATE: 16 BRPM

## 2018-09-14 DIAGNOSIS — J30.1 CHRONIC SEASONAL ALLERGIC RHINITIS DUE TO POLLEN: Primary | ICD-10-CM

## 2018-09-14 DIAGNOSIS — H10.45 CHRONIC ALLERGIC CONJUNCTIVITIS: ICD-10-CM

## 2018-09-14 DIAGNOSIS — J30.9 ALLERGIC RHINITIS: Primary | ICD-10-CM

## 2018-09-14 DIAGNOSIS — J45.20 INTERMITTENT ASTHMA, UNCOMPLICATED: ICD-10-CM

## 2018-09-14 DIAGNOSIS — J30.81 NON-SEASONAL ALLERGIC RHINITIS DUE TO ANIMAL HAIR AND DANDER: ICD-10-CM

## 2018-09-14 LAB
FEF 25/75: NORMAL
FEV-1: NORMAL
FEV1/FVC: NORMAL
FVC: NORMAL

## 2018-09-14 PROCEDURE — 99214 OFFICE O/P EST MOD 30 MIN: CPT | Mod: 25 | Performed by: ALLERGY & IMMUNOLOGY

## 2018-09-14 PROCEDURE — 94010 BREATHING CAPACITY TEST: CPT | Performed by: ALLERGY & IMMUNOLOGY

## 2018-09-14 PROCEDURE — 99207 ZZC DROP WITH A PROCEDURE: CPT

## 2018-09-14 PROCEDURE — 95117 IMMUNOTHERAPY INJECTIONS: CPT

## 2018-09-14 ASSESSMENT — ENCOUNTER SYMPTOMS
ARTHRALGIAS: 0
ACTIVITY CHANGE: 0
MYALGIAS: 0
FEVER: 0
EYE DISCHARGE: 0
NAUSEA: 0
VOMITING: 0
EYE REDNESS: 0
HEADACHES: 0
SINUS PRESSURE: 0
EYE ITCHING: 1
FACIAL SWELLING: 0
JOINT SWELLING: 0
CHEST TIGHTNESS: 0
WHEEZING: 0
COUGH: 0
ADENOPATHY: 0
RHINORRHEA: 1
FATIGUE: 0
DIARRHEA: 0
SHORTNESS OF BREATH: 0

## 2018-09-14 NOTE — MR AVS SNAPSHOT
After Visit Summary   9/14/2018    Dmitri Mcguire    MRN: [id number]           Patient Information     Date Of Birth          1990        Visit Information        Provider Department      9/14/2018 1:15 PM ALLERGY MA - [facility]        Today's Diagnoses     Allergic rhinitis    -  1       Follow-ups after your visit        Who to contact     If you have questions or need follow up information about today's clinic visit or your schedule please contact [facility] directly at [phone].  Normal or non-critical lab and imaging results will be communicated to you by MyChart, letter or phone within 4 business days after the clinic has received the results. If you do not hear from us within 7 days, please contact the clinic through MyChart or phone. If you have a critical or abnormal lab result, we will notify you by phone as soon as possible.  Submit refill requests through [employer] or call your pharmacy and they will forward the refill request to us. Please allow 3 business days for your refill to be completed.          Additional Information About Your Visit        Care EveryWhere ID     This is your Care EveryWhere ID. This could be used by other organizations to access your [facility] medical records  [id number]         Blood Pressure from Last 3 Encounters:   09/14/18 132/86   04/02/18 138/87   09/29/17 117/78    Weight from Last 3 Encounters:   09/14/18 80 kg (176 lb 5.9 oz)   04/02/18 77.6 kg (171 lb 1.2 oz)   09/29/17 77 kg (169 lb 12.1 oz)              We Performed the Following     Allergy Shot: Two or more injections        Primary Care Provider Office Phone # Fax #    [facility] [phone] [address] [facility] [address]        Equal Access to Services     EDUIN PENNINGTON : Elsie Lyle, christine zuluaga, josiah sandoval. [facility]  [phone].    ATENCIÓN: Si rolando jasso, tiene a spencer disposición servicios gratuitos de asistencia lingüística. Mushtaq elena [phone].    We comply with applicable federal civil rights laws and [place] laws. We do not discriminate on the basis of race, color, national origin, age, disability, sex, sexual orientation, or gender identity.            Thank you!     Thank you for choosing [facility]  for your care. Our goal is always to provide you with excellent care. Hearing back from our patients is one way we can continue to improve our services. Please take a few minutes to complete the written survey that you may receive in the mail after your visit with us. Thank you!             Your Updated Medication List - Protect others around you: Learn how to safely use, store and throw away your medicines at www.disposemymeds.org.          This list is accurate as of 9/14/18  1:55 PM.  Always use your most recent med list.                   Brand Name Dispense Instructions for use Diagnosis    ADVIL 200 MG capsule   Generic drug:  ibuprofen      Take 200 mg by mouth every 4 hours as needed Reported on 4/28/2017        albuterol 108 (90 Base) MCG/ACT inhaler    PROAIR HFA/PROVENTIL HFA/VENTOLIN HFA     Inhale 2 puffs into the lungs every 4 hours as needed for shortness of breath / dyspnea or wheezing        EPINEPHrine 0.3 MG/0.3ML injection 2-pack    AUVI-Q    2 mL    Inject 0.3 mLs (0.3 mg) into the muscle as needed for anaphylaxis    Seasonal allergic rhinitis due to pollen, Non-seasonal allergic rhinitis due to animal hair and dander       fluticasone 50 MCG/ACT spray    FLONASE    1 Bottle    Spray 2 sprays into both nostrils daily    Atopic rhinitis       ORDER FOR ALLERGEN IMMUNOTHERAPY 5 mL vial     5 mL    Name of Mix: Mix #1  Cat, Dog Cat Hair, Standardized 10,000 BAU/mL, ALK  2.0 ml Dog Hair Dander, A. P.  1:100 w/v, HS  1.0 ml  Diluent: HSA qs to 5ml    Non-seasonal allergic rhinitis due to  animal hair and dander, Chronic allergic conjunctivitis, Need for desensitization to allergens, Chronic seasonal allergic rhinitis due to pollen       ORDER FOR ALLERGEN IMMUNOTHERAPY 5 mL vial     5 mL    Name of Mix: Mix #2  Tree  Birch Mix PRW 1:20 w/v, HS  0.5 ml Boxelder-Maple Mix BHR (Boxelder Hard Red) 1:20 w/v, HS  0.5 ml [place] Mix RW 1:20 w/v, HS 0.5 ml Oak Mix RVW 1:20 w/v, HS 0.5 ml [place] Tree, Black 1:20 w/v, HS 0.5 ml [place], Black 1:20 w/v, HS 0.5 ml Diluent: HSA qs to 5ml    Chronic seasonal allergic rhinitis due to pollen, Need for desensitization to allergens, Non-seasonal allergic rhinitis due to animal hair and dander, Chronic allergic conjunctivitis

## 2018-09-14 NOTE — LETTER
9/14/2018         RE: Dmitri Mcguire  [address] [address] [place] [address]        Dear Colleague,    Thank you for referring your patient, Dmitri Mcguire, to the [facility]. Please see a copy of my visit note below.    SUBJECTIVE:                                                               Dmitri Mcguire presents today to our Allergy Clinic at [facility] for a follow up visit.  As you know, he is a 28 year old male with allergic rhinoconjunctivitis and asthma.    Percutaneous skin puncture testing for aeroallergens performed on April 28, 2017 showed sensitivity for dog, cat, rabbit and tree pollen (Birch mix, [place], Oak, [place], and Black [place]). Borderline sensitivity for Selena/[place]. Bernard noted.   On allergen immunotherapy. Cat/dog IT since 6/30/2017. Trees IT since 9/22/17.        Allergy Immunotherapy  Date/time of injection(s): 9/14/18    Vial Color Content  Dose  Red 1:1 Cat, Dog  0.30mL  Red 1:1 Trees  0.30mL                He was able to reach the maintenance, Red 1:1 0.5 mL. The current dose was adjusted because he started new vials.  He tolerates injections well without persistent large local reactions or history of systemic reactions.    The patient reports at least 60% improvement in rhinoconjunctivitis symptoms with allergen immunotherapy.  They recently got a dog.  He develops nasal congestion, postnasal drip, rhinorrhea, sneezing, and itchy eyes only when he plays with the dog for a very long time.  In the past, he would not be able to spend much time in the same room with the dogs, even without touching.  On the other hand, he admits that he is not entirely asymptomatic around the pets.  He does not use any nasal sprays.  Will take Allegra only on the days of allergen immunotherapy and on rare occasions as needed.       Regarding his asthma, he states that it is well controlled. Dmitri is using albuterol HFA less than twice per week for rescue from  chest symptoms. He is waking up less than twice per month due to chest symptoms. There has been no use of oral steroids since the last visit. The patient denies current chest tightness, cough, wheeze or SOB.   He denies interval ED/urgent care/PCP/other specialists visits for asthma flare.        Patient Active Problem List   Diagnosis     Intermittent asthma, uncomplicated     Seasonal allergic rhinitis due to pollen     Non-seasonal allergic rhinitis due to animal hair and dander     Chronic allergic conjunctivitis       Past Medical History:   Diagnosis Date     Allergic rhinitis due to other allergen      Mild intermittent asthma       Problem (# of Occurrences) Relation (Name,Age of Onset)    Cardiovascular (1) Maternal Grandfather: stents    HEART DISEASE (1) Maternal Grandfather        History reviewed. No pertinent surgical history.  Social History     Social History     Marital status: Single     Spouse name: N/A     Number of children: N/A     Years of education: N/A     Social History Main Topics     Smoking status: Former Smoker     Years: 3.00     Types: Cigarettes, Dip, chew, snus or snuff     Smokeless tobacco: Former User     Types: Chew     Alcohol use Yes     Drug use: No     Sexual activity: Yes     Partners: Female     Birth control/ protection: Condom     Other Topics Concern     Parent/Sibling W/ Cabg, Mi Or Angioplasty Before 65f 55m? No     Social History Narrative    April 21, 2017    ENVIRONMENTAL HISTORY: [place] is heated with a gas furnace. They does have central air conditioning. The patient's bedroom is furnished with carpeting in bedroom and fabric window coverings.  Pets inside the house include 1 rabbit. There is not history of cockroach or mice infestation. There are no smokers in the house.  The house does not have a damp basement.    September 14, 2018    Patient now has a dog.            Review of Systems   Constitutional:  Negative for activity change, fatigue and fever.   HENT: Positive for congestion, postnasal drip, rhinorrhea and sneezing. Negative for dental problem, ear pain, facial swelling, nosebleeds and sinus pressure.    Eyes: Positive for itching. Negative for discharge and redness.   Respiratory: Negative for cough, chest tightness, shortness of breath and wheezing.    Cardiovascular: Negative for chest pain.   Gastrointestinal: Negative for diarrhea, nausea and vomiting.   Musculoskeletal: Negative for arthralgias, joint swelling and myalgias.   Allergic/Immunologic: Positive for environmental allergies.   Neurological: Negative for headaches.   Hematological: Negative for adenopathy.   Psychiatric/Behavioral: Negative for behavioral problems and self-injury.           Current Outpatient Prescriptions:      ORDER FOR ALLERGEN IMMUNOTHERAPY, Name of Mix: Mix #1  Cat, Dog Cat Hair, Standardized 10,000 BAU/mL, ALK  2.0 ml Dog Hair Dander, A. P.  1:100 w/v, HS  1.0 ml  Diluent: HSA qs to 5ml, Disp: 5 mL, Rfl: PRN     ORDER FOR ALLERGEN IMMUNOTHERAPY, Name of Mix: Mix #2  Tree  Birch Mix PRW 1:20 w/v, HS  0.5 ml Boxelder-Maple Mix BHR (Boxelder Hard Red) 1:20 w/v, HS  0.5 ml [place] Mix RW 1:20 w/v, HS 0.5 ml Oak Mix RVW 1:20 w/v, HS 0.5 ml [place] Tree, Black 1:20 w/v, HS 0.5 ml [place], Black 1:20 w/v, HS 0.5 ml Diluent: HSA qs to 5ml, Disp: 5 mL, Rfl: PRN     albuterol (PROAIR HFA/PROVENTIL HFA/VENTOLIN HFA) 108 (90 BASE) MCG/ACT Inhaler, Inhale 2 puffs into the lungs every 4 hours as needed for shortness of breath / dyspnea or wheezing, Disp: , Rfl:      EPINEPHrine (AUVI-Q) 0.3 MG/0.3ML injection 2-pack, Inject 0.3 mLs (0.3 mg) into the muscle as needed for anaphylaxis (Patient not taking: Reported on 9/14/2018), Disp: 2 mL, Rfl: 1     fluticasone (FLONASE) 50 MCG/ACT spray, Spray 2 sprays into both nostrils daily (Patient not taking: Reported on 9/29/2017), Disp: 1 Bottle, Rfl: 11     ibuprofen (ADVIL) 200 MG capsule,  Take 200 mg by mouth every 4 hours as needed Reported on 4/28/2017, Disp: , Rfl:   Immunization History   Administered Date(s) Administered     HEPA 08/25/2010     HepB 08/18/2004, 09/24/2004, 08/03/2005     Hib (PRP-T) 1990, 1990, 1990, 05/04/1993     Historical DTP/aP 1990, 1990, 1992, 10/05/1993, 05/14/1997     Influenza (IIV3) PF 11/27/2007, 12/01/2008, 10/05/2009, 09/17/2010     MMR 05/04/1993, 08/18/2004     Meningococcal (Menactra ) 08/25/2010     OPV, trivalent, live 1990, 1992, 10/05/1993     Poliovirus, inactivated (IPV) 05/14/1997     TDAP Vaccine (Adacel) 06/01/2017     Tetanus 08/18/2004     Allergies   Allergen Reactions     Amoxicillin Unknown     Ceclor [Cefaclor] Unknown     Erythrocin [Erythromycin] Diarrhea     Penicillins Unknown     OBJECTIVE:                                                                 /86 (BP Location: Left arm, Patient Position: Sitting, Cuff Size: Adult Regular)  Pulse 66  Temp 97.6  F (36.4  C) (Oral)  Resp 16  Wt 80 kg (176 lb 5.9 oz)  SpO2 97%  BMI 27.36 kg/m2        Physical Exam   Constitutional: No distress.   HENT:   Head: Normocephalic and atraumatic.   Right Ear: Tympanic membrane, external ear and ear canal normal.   Left Ear: Tympanic membrane, external ear and ear canal normal.   Nose: No mucosal edema or rhinorrhea.   Mouth/Throat: Oropharynx is clear and moist and mucous membranes are normal. No oropharyngeal exudate, posterior oropharyngeal edema or posterior oropharyngeal erythema.   Eyes: Conjunctivae are normal. Right eye exhibits no discharge. Left eye exhibits no discharge.   Neck: Normal range of motion.   Cardiovascular: Normal rate, regular rhythm and normal heart sounds.    No murmur heard.  Pulmonary/Chest: Effort normal and breath sounds normal. No respiratory distress. He has no decreased breath sounds. He has no wheezes. He has no rhonchi. He has no rales.   Musculoskeletal: Normal  range of motion.   Neurological: He is alert.   Skin: Skin is warm. He is not diaphoretic.   Psychiatric: Mood, affect and judgment normal.   Nursing note and vitals reviewed.            WORKUP:   SPIROMETRY       FVC 4.28L (85% of predicted).     FEV1 3.60L (85% of predicted).     FEV1/FVC 84%     FEF 25%-75%  3.76L/s (84% of predicted)    The office spirometry performed today doesn't suggest an obstruction.      Asthma Control Test (ACT) total score: 24       ASSESSMENT/PLAN:      Problem List Items Addressed This Visit        Respiratory    1. Intermittent asthma, uncomplicated  Currently well controlled with albuterol inhaler on as-needed basis.  -Continue as is.    Relevant Orders    Spirometry, Breathing Capacity (Completed)    2. Seasonal allergic rhinitis due to pollen - Primary  After 1 year of treatment, his symptoms improved by 60%.  The patient is satisfied with the results though not completely but wants to continue allergen immunotherapy for the next year.  I offered him to stay either on the same dose, or consider increasing the maintenance dose to 0.6 mL, or even 0.7 mL.  We discussed the higher risk of anaphylaxis with higher doses.  He will discuss that with his fiancee and get back to us with an answer.  -He may use intranasal fluticasone or oral antihistamines more frequently when he has symptoms.    3. Non-seasonal allergic rhinitis due to animal hair and dander       Infectious/Inflammatory    4. Chronic allergic conjunctivitis        I spent 25 minutes for this visit, with at least 15 minutes face-to-face counseling the patient about management of allergic rhinitis and asthma, and efficacy of allergen immunotherapy.    Return in about 1 year (around 9/14/2019), or if symptoms worsen or fail to improve.    Thank you for allowing us to participate in the care of this patient. Please feel free to contact us if there are any questions or concerns about the patient.    Disclaimer: This note  consists of symbols derived from keyboarding, dictation and/or voice recognition software. As a result, there may be errors in the script that have gone undetected. Please consider this when interpreting information found in this chart.    Carlo Alegre MD, [facility]  Allergy, Asthma and Immunology  [place], [place] and [place]      Again, thank you for allowing me to participate in the care of your patient.        Sincerely,        Carlo Alegre MD

## 2018-09-14 NOTE — MR AVS SNAPSHOT
After Visit Summary   9/14/2018    Dmitri Mcguire    MRN: [id number]           Patient Information     Date Of Birth          1990        Visit Information        Provider Department      9/14/2018 1:40 PM Carlo Alegre MD [facility]        Today's Diagnoses     Chronic seasonal allergic rhinitis due to pollen    -  1    Non-seasonal allergic rhinitis due to animal hair and dander        Chronic allergic conjunctivitis        Intermittent asthma, uncomplicated           Follow-ups after your visit        Follow-up notes from your care team     Return in about 1 year (around 9/14/2019), or if symptoms worsen or fail to improve.      Who to contact     If you have questions or need follow up information about today's clinic visit or your schedule please contact [facility] directly at [phone].  Normal or non-critical lab and imaging results will be communicated to you by MyChart, letter or phone within 4 business days after the clinic has received the results. If you do not hear from us within 7 days, please contact the clinic through MyChart or phone. If you have a critical or abnormal lab result, we will notify you by phone as soon as possible.  Submit refill requests through [employer] or call your pharmacy and they will forward the refill request to us. Please allow 3 business days for your refill to be completed.          Additional Information About Your Visit        Care EveryWhere ID     This is your Care EveryWhere ID. This could be used by other organizations to access your [place] medical records  [id number]        Your Vitals Were     Pulse Temperature Respirations Pulse Oximetry BMI (Body Mass Index)       66 97.6  F (36.4  C) (Oral) 16 97% 27.36 kg/m2        Blood Pressure from Last 3 Encounters:   09/14/18 132/86   04/02/18 138/87   09/29/17 117/78    Weight from Last 3 Encounters:   09/14/18 80 kg (176 lb 5.9 oz)   04/02/18 77.6 kg (171 lb 1.2 oz)    09/29/17 77 kg (169 lb 12.1 oz)              We Performed the Following     Spirometry, Breathing Capacity        Primary Care Provider Office Phone # Fax #    [facility] [phone] [address]  [facility] [address]        Equal Access to Services     EDUIN PENNINGTON : Hadii aad ku hadasho Soomaali, waaxda luqadaha, qaybta kaalmada adeegyada, waxay idiin hayaan adeeg leelee laoraln ah. So [facility] [phone].    ATENCIÓN: Si habla español, tiene a spencer disposición servicios gratuitos de asistencia lingüística. Llame al [phone].    We comply with applicable federal civil rights laws and [place] laws. We do not discriminate on the basis of race, color, national origin, age, disability, sex, sexual orientation, or gender identity.            Thank you!     Thank you for choosing [facility]  for your care. Our goal is always to provide you with excellent care. Hearing back from our patients is one way we can continue to improve our services. Please take a few minutes to complete the written survey that you may receive in the mail after your visit with us. Thank you!             Your Updated Medication List - Protect others around you: Learn how to safely use, store and throw away your medicines at www.disposemymeds.org.          This list is accurate as of 9/14/18 11:59 PM.  Always use your most recent med list.                   Brand Name Dispense Instructions for use Diagnosis    ADVIL 200 MG capsule   Generic drug:  ibuprofen      Take 200 mg by mouth every 4 hours as needed Reported on 4/28/2017        albuterol 108 (90 Base) MCG/ACT inhaler    PROAIR HFA/PROVENTIL HFA/VENTOLIN HFA     Inhale 2 puffs into the lungs every 4 hours as needed for shortness of breath / dyspnea or wheezing        EPINEPHrine 0.3 MG/0.3ML injection 2-pack    AUVI-Q    2 mL    Inject 0.3 mLs (0.3 mg) into the muscle as needed for anaphylaxis    Seasonal allergic rhinitis due to  pollen, Non-seasonal allergic rhinitis due to animal hair and dander       fluticasone 50 MCG/ACT spray    FLONASE    1 Bottle    Spray 2 sprays into both nostrils daily    Atopic rhinitis       ORDER FOR ALLERGEN IMMUNOTHERAPY 5 mL vial     5 mL    Name of Mix: Mix #1  Cat, Dog Cat Hair, Standardized 10,000 BAU/mL, ALK  2.0 ml Dog Hair Dander, A. P.  1:100 w/v, HS  1.0 ml  Diluent: HSA qs to 5ml    Non-seasonal allergic rhinitis due to animal hair and dander, Chronic allergic conjunctivitis, Need for desensitization to allergens, Chronic seasonal allergic rhinitis due to pollen       ORDER FOR ALLERGEN IMMUNOTHERAPY 5 mL vial     5 mL    Name of Mix: Mix #2  Tree  Birch Mix PRW 1:20 w/v, HS  0.5 ml Boxelder-Maple Mix BHR (Boxelder Hard Red) 1:20 w/v, HS  0.5 ml [place] Mix RW 1:20 w/v, HS 0.5 ml Oak Mix RVW 1:20 w/v, HS 0.5 ml [place] Tree, Black 1:20 w/v, HS 0.5 ml [place], Black 1:20 w/v, HS 0.5 ml Diluent: HSA qs to 5ml    Chronic seasonal allergic rhinitis due to pollen, Need for desensitization to allergens, Non-seasonal allergic rhinitis due to animal hair and dander, Chronic allergic conjunctivitis

## 2018-09-15 ASSESSMENT — ASTHMA QUESTIONNAIRES: ACT_TOTALSCORE: 24

## 2018-09-28 ENCOUNTER — ALLIED HEALTH/NURSE VISIT (OUTPATIENT)
Dept: ALLERGY | Facility: CLINIC | Age: 28
End: 2018-09-28
Payer: COMMERCIAL

## 2018-09-28 DIAGNOSIS — J30.9 ALLERGIC RHINITIS: Primary | ICD-10-CM

## 2018-09-28 PROCEDURE — 99207 ZZC DROP WITH A PROCEDURE: CPT

## 2018-09-28 PROCEDURE — 95117 IMMUNOTHERAPY INJECTIONS: CPT

## 2018-09-28 NOTE — MR AVS SNAPSHOT
After Visit Summary   9/28/2018    Dmitri Mcguire    MRN: [id number]           Patient Information     Date Of Birth          1990        Visit Information        Provider Department      9/28/2018 3:15 PM ALLERGY [facility]        Today's Diagnoses     Allergic rhinitis    -  1       Follow-ups after your visit        Your next 10 appointments already scheduled     Oct 12, 2018  3:45 PM CDT   Nurse Only with ALLERGY [facility] ([facility])    [address] [address] [phone]           Every allergy patient MUST wait 30 minutes after their allergy shot. No exceptions.  Xolair shots #1-3 should plan to wait 2 hours in clinic Xolair shots after #4 should plan 30 minute wait in clinic              Who to contact     If you have questions or need follow up information about today's clinic visit or your schedule please contact [facility] directly at [phone].  Normal or non-critical lab and imaging results will be communicated to you by [employer], letter or phone within 4 business days after the clinic has received the results. If you do not hear from us within 7 days, please contact the clinic through [employer] or phone. If you have a critical or abnormal lab result, we will notify you by phone as soon as possible.  Submit refill requests through C[employer] or call your pharmacy and they will forward the refill request to us. Please allow 3 business days for your refill to be completed.          Additional Information About Your Visit        Care EveryWhere ID     This is your Care EveryWhere ID. This could be used by other organizations to access your [place] medical records  [id number]         Blood Pressure from Last 3 Encounters:   09/14/18 132/86   04/02/18 138/87   09/29/17 117/78    Weight from Last 3 Encounters:   09/14/18 80 kg (176 lb 5.9 oz)   04/02/18 77.6 kg (171 lb 1.2 oz)    09/29/17 77 kg (169 lb 12.1 oz)              We Performed the Following     Allergy Shot: Two or more injections        Primary Care Provider Office Phone # Fax #    [facility] [phone] [address]  [facility] [address]        Equal Access to Services     EDUIN PENNINGTON : Hadii aad ku hadasho Soomaali, waaxda luqadaha, qaybta kaalmada adeegyada, waxay idiin hayaan adeeg leelee laoraln meme. So [facility] [phone].    ATENCIÓN: Si habla español, tiene a spencer disposición servicios gratuitos de asistencia lingüística. Llame al [phone].    We comply with applicable federal civil rights laws and [place] laws. We do not discriminate on the basis of race, color, national origin, age, disability, sex, sexual orientation, or gender identity.            Thank you!     Thank you for choosing [facility]  for your care. Our goal is always to provide you with excellent care. Hearing back from our patients is one way we can continue to improve our services. Please take a few minutes to complete the written survey that you may receive in the mail after your visit with us. Thank you!             Your Updated Medication List - Protect others around you: Learn how to safely use, store and throw away your medicines at www.disposemymeds.org.          This list is accurate as of 9/28/18  4:11 PM.  Always use your most recent med list.                   Brand Name Dispense Instructions for use Diagnosis    ADVIL 200 MG capsule   Generic drug:  ibuprofen      Take 200 mg by mouth every 4 hours as needed Reported on 4/28/2017        albuterol 108 (90 Base) MCG/ACT inhaler    PROAIR HFA/PROVENTIL HFA/VENTOLIN HFA     Inhale 2 puffs into the lungs every 4 hours as needed for shortness of breath / dyspnea or wheezing        EPINEPHrine 0.3 MG/0.3ML injection 2-pack    AUVI-Q    2 mL    Inject 0.3 mLs (0.3 mg) into the muscle as needed for anaphylaxis    Seasonal allergic rhinitis  due to pollen, Non-seasonal allergic rhinitis due to animal hair and dander       fluticasone 50 MCG/ACT spray    FLONASE    1 Bottle    Spray 2 sprays into both nostrils daily    Atopic rhinitis       ORDER FOR ALLERGEN IMMUNOTHERAPY 5 mL vial     5 mL    Name of Mix: Mix #1  Cat, Dog Cat Hair, Standardized 10,000 BAU/mL, ALK  2.0 ml Dog Hair Dander, A. P.  1:100 w/v, HS  1.0 ml  Diluent: HSA qs to 5ml    Non-seasonal allergic rhinitis due to animal hair and dander, Chronic allergic conjunctivitis, Need for desensitization to allergens, Chronic seasonal allergic rhinitis due to pollen       ORDER FOR ALLERGEN IMMUNOTHERAPY 5 mL vial     5 mL    Name of Mix: Mix #2  Tree  Birch Mix PRW 1:20 w/v, HS  0.5 ml Boxelder-Maple Mix BHR (Boxelder Hard Red) 1:20 w/v, HS  0.5 ml [place] Mix RW 1:20 w/v, HS 0.5 ml Oak Mix RVW 1:20 w/v, HS 0.5 ml [place] Tree, Black 1:20 w/v, HS 0.5 ml [place], Black 1:20 w/v, HS 0.5 ml Diluent: HSA qs to 5ml    Chronic seasonal allergic rhinitis due to pollen, Need for desensitization to allergens, Non-seasonal allergic rhinitis due to animal hair and dander, Chronic allergic conjunctivitis

## 2018-10-12 ENCOUNTER — ALLIED HEALTH/NURSE VISIT (OUTPATIENT)
Dept: ALLERGY | Facility: CLINIC | Age: 28
End: 2018-10-12
Payer: COMMERCIAL

## 2018-10-12 DIAGNOSIS — J30.9 ALLERGIC RHINITIS: Primary | ICD-10-CM

## 2018-10-12 PROCEDURE — 95117 IMMUNOTHERAPY INJECTIONS: CPT

## 2018-10-12 PROCEDURE — 99207 ZZC DROP WITH A PROCEDURE: CPT

## 2018-10-12 NOTE — MR AVS SNAPSHOT
After Visit Summary   10/12/2018    Dmitri Mcguire    MRN: [id number]           Patient Information     Date Of Birth          1990        Visit Information        Provider Department      10/12/2018 3:45 PM ALLERGY [facility] - [facility]        Today's Diagnoses     Allergic rhinitis    -  1       Follow-ups after your visit        Who to contact     If you have questions or need follow up information about today's clinic visit or your schedule please contact [facility] directly at [phone].  Normal or non-critical lab and imaging results will be communicated to you by MyChart, letter or phone within 4 business days after the clinic has received the results. If you do not hear from us within 7 days, please contact the clinic through MyChart or phone. If you have a critical or abnormal lab result, we will notify you by phone as soon as possible.  Submit refill requests through [employer] or call your pharmacy and they will forward the refill request to us. Please allow 3 business days for your refill to be completed.          Additional Information About Your Visit        Care EveryWhere ID     This is your Care EveryWhere ID. This could be used by other organizations to access your [place] medical records  [id number]         Blood Pressure from Last 3 Encounters:   09/14/18 132/86   04/02/18 138/87   09/29/17 117/78    Weight from Last 3 Encounters:   09/14/18 80 kg (176 lb 5.9 oz)   04/02/18 77.6 kg (171 lb 1.2 oz)   09/29/17 77 kg (169 lb 12.1 oz)              We Performed the Following     Allergy Shot: Two or more injections        Primary Care Provider Office Phone # Fax #    [facility] [phone] [address] [address]        Equal Access to Services     EDUIN PENNINGTON : Elsie Lyle, christine zuluaga, josiah sandoval. So [facility]  [phone].    ATENCIÓN: Si rolando jasso, tiene a spencer disposición servicios gratuitos de asistencia lingüística. Mushtaq elena [phone].    We comply with applicable federal civil rights laws and [place] laws. We do not discriminate on the basis of race, color, national origin, age, disability, sex, sexual orientation, or gender identity.            Thank you!     Thank you for choosing [facility]  for your care. Our goal is always to provide you with excellent care. Hearing back from our patients is one way we can continue to improve our services. Please take a few minutes to complete the written survey that you may receive in the mail after your visit with us. Thank you!             Your Updated Medication List - Protect others around you: Learn how to safely use, store and throw away your medicines at www.disposemymeds.org.          This list is accurate as of 10/12/18  4:30 PM.  Always use your most recent med list.                   Brand Name Dispense Instructions for use Diagnosis    ADVIL 200 MG capsule   Generic drug:  ibuprofen      Take 200 mg by mouth every 4 hours as needed Reported on 4/28/2017        albuterol 108 (90 Base) MCG/ACT inhaler    PROAIR HFA/PROVENTIL HFA/VENTOLIN HFA     Inhale 2 puffs into the lungs every 4 hours as needed for shortness of breath / dyspnea or wheezing        EPINEPHrine 0.3 MG/0.3ML injection 2-pack    AUVI-Q    2 mL    Inject 0.3 mLs (0.3 mg) into the muscle as needed for anaphylaxis    Seasonal allergic rhinitis due to pollen, Non-seasonal allergic rhinitis due to animal hair and dander       fluticasone 50 MCG/ACT spray    FLONASE    1 Bottle    Spray 2 sprays into both nostrils daily    Atopic rhinitis       ORDER FOR ALLERGEN IMMUNOTHERAPY 5 mL vial     5 mL    Name of Mix: Mix #1  Cat, Dog Cat Hair, Standardized 10,000 BAU/mL, ALK  2.0 ml Dog Hair Dander, A. P.  1:100 w/v, HS  1.0 ml  Diluent: HSA qs to 5ml    Non-seasonal allergic rhinitis due to  animal hair and dander, Chronic allergic conjunctivitis, Need for desensitization to allergens, Chronic seasonal allergic rhinitis due to pollen       ORDER FOR ALLERGEN IMMUNOTHERAPY 5 mL vial     5 mL    Name of Mix: Mix #2  Tree  Birch Mix PRW 1:20 w/v, HS  0.5 ml Boxelder-Maple Mix BHR (Boxelder Hard Red) 1:20 w/v, HS  0.5 ml [place] Mix RW 1:20 w/v, HS 0.5 ml Oak Mix RVW 1:20 w/v, HS 0.5 ml [place] Tree, Black 1:20 w/v, HS 0.5 ml [place], Black 1:20 w/v, HS 0.5 ml Diluent: HSA qs to 5ml    Chronic seasonal allergic rhinitis due to pollen, Need for desensitization to allergens, Non-seasonal allergic rhinitis due to animal hair and dander, Chronic allergic conjunctivitis

## 2018-11-09 ENCOUNTER — ALLIED HEALTH/NURSE VISIT (OUTPATIENT)
Dept: ALLERGY | Facility: CLINIC | Age: 28
End: 2018-11-09
Payer: COMMERCIAL

## 2018-11-09 DIAGNOSIS — J30.9 ALLERGIC RHINITIS: Primary | ICD-10-CM

## 2018-11-09 PROCEDURE — 99207 ZZC DROP WITH A PROCEDURE: CPT

## 2018-11-09 PROCEDURE — 95115 IMMUNOTHERAPY ONE INJECTION: CPT

## 2018-11-09 NOTE — MR AVS SNAPSHOT
After Visit Summary   11/9/2018    Dmitri Mcguire    MRN: [id number]           Patient Information     Date Of Birth          1990        Visit Information        Provider Department      11/9/2018 2:45 PM ALLERGY [place] - [facility]        Today's Diagnoses     Allergic rhinitis    -  1       Follow-ups after your visit        Who to contact     If you have questions or need follow up information about today's clinic visit or your schedule please contact [facility] directly at [phone].  Normal or non-critical lab and imaging results will be communicated to you by MyChart, letter or phone within 4 business days after the clinic has received the results. If you do not hear from us within 7 days, please contact the clinic through MyChart or phone. If you have a critical or abnormal lab result, we will notify you by phone as soon as possible.  Submit refill requests through [employer] or call your pharmacy and they will forward the refill request to us. Please allow 3 business days for your refill to be completed.          Additional Information About Your Visit        Care EveryWhere ID     This is your Care EveryWhere ID. This could be used by other organizations to access your [place] medical records  [id number]         Blood Pressure from Last 3 Encounters:   09/14/18 132/86   04/02/18 138/87   09/29/17 117/78    Weight from Last 3 Encounters:   09/14/18 80 kg (176 lb 5.9 oz)   04/02/18 77.6 kg (171 lb 1.2 oz)   09/29/17 77 kg (169 lb 12.1 oz)              We Performed the Following     Allergy Shot: One injection        Primary Care Provider Office Phone # Fax #    [facility] [phone] [address] [facility] [address]        Equal Access to Services     EDUIN PENNINGTON AH: Elsie Lyle, christine zuluaga, josiah sandoval. So [facility]  [phone].    ATENCIÓN: Si rolando jasso, tiene a spencer disposición servicios gratuitos de asistencia lingüística. Mushtaq elena [phone].    We comply with applicable federal civil rights laws and [place] laws. We do not discriminate on the basis of race, color, national origin, age, disability, sex, sexual orientation, or gender identity.            Thank you!     Thank you for choosing [facility]  for your care. Our goal is always to provide you with excellent care. Hearing back from our patients is one way we can continue to improve our services. Please take a few minutes to complete the written survey that you may receive in the mail after your visit with us. Thank you!             Your Updated Medication List - Protect others around you: Learn how to safely use, store and throw away your medicines at www.disposemymeds.org.          This list is accurate as of 11/9/18  3:23 PM.  Always use your most recent med list.                   Brand Name Dispense Instructions for use Diagnosis    ADVIL 200 MG capsule   Generic drug:  ibuprofen      Take 200 mg by mouth every 4 hours as needed Reported on 4/28/2017        albuterol 108 (90 Base) MCG/ACT inhaler    PROAIR HFA/PROVENTIL HFA/VENTOLIN HFA     Inhale 2 puffs into the lungs every 4 hours as needed for shortness of breath / dyspnea or wheezing        EPINEPHrine 0.3 MG/0.3ML injection 2-pack    AUVI-Q    2 mL    Inject 0.3 mLs (0.3 mg) into the muscle as needed for anaphylaxis    Seasonal allergic rhinitis due to pollen, Non-seasonal allergic rhinitis due to animal hair and dander       fluticasone 50 MCG/ACT spray    FLONASE    1 Bottle    Spray 2 sprays into both nostrils daily    Atopic rhinitis       ORDER FOR ALLERGEN IMMUNOTHERAPY 5 mL vial     5 mL    Name of Mix: Mix #1  Cat, Dog Cat Hair, Standardized 10,000 BAU/mL, ALK  2.0 ml Dog Hair Dander, A. P.  1:100 w/v, HS  1.0 ml  Diluent: HSA qs to 5ml    Non-seasonal allergic rhinitis due to  animal hair and dander, Chronic allergic conjunctivitis, Need for desensitization to allergens, Chronic seasonal allergic rhinitis due to pollen       ORDER FOR ALLERGEN IMMUNOTHERAPY 5 mL vial     5 mL    Name of Mix: Mix #2  Tree  Birch Mix PRW 1:20 w/v, HS  0.5 ml Boxelder-Maple Mix BHR (Boxelder Hard Red) 1:20 w/v, HS  0.5 ml [place] Mix RW 1:20 w/v, HS 0.5 ml Oak Mix RVW 1:20 w/v, HS 0.5 ml [place] Tree, Black 1:20 w/v, HS 0.5 ml [place], Black 1:20 w/v, HS 0.5 ml Diluent: HSA qs to 5ml    Chronic seasonal allergic rhinitis due to pollen, Need for desensitization to allergens, Non-seasonal allergic rhinitis due to animal hair and dander, Chronic allergic conjunctivitis

## 2018-11-09 NOTE — PROGRESS NOTES
Patient presented after waiting 30 minutes with no reaction to  injections. Discharged from clinic.    Kriss Hamilton RN'

## 2018-12-07 ENCOUNTER — ALLIED HEALTH/NURSE VISIT (OUTPATIENT)
Dept: ALLERGY | Facility: CLINIC | Age: 28
End: 2018-12-07
Payer: COMMERCIAL

## 2018-12-07 DIAGNOSIS — J30.9 ALLERGIC RHINITIS: Primary | ICD-10-CM

## 2018-12-07 PROCEDURE — 99207 ZZC DROP WITH A PROCEDURE: CPT

## 2018-12-07 PROCEDURE — 95117 IMMUNOTHERAPY INJECTIONS: CPT

## 2018-12-07 NOTE — MR AVS SNAPSHOT
After Visit Summary   12/7/2018    Dmitri Mcguire    MRN: [id number]           Patient Information     Date Of Birth          1990        Visit Information        Provider Department      12/7/2018 4:00 PM ALLERGY [place] - [facility]        Today's Diagnoses     Allergic rhinitis    -  1       Follow-ups after your visit        Who to contact     If you have questions or need follow up information about today's clinic visit or your schedule please contact [facility] directly at [phone].  Normal or non-critical lab and imaging results will be communicated to you by MyChart, letter or phone within 4 business days after the clinic has received the results. If you do not hear from us within 7 days, please contact the clinic through MyChart or phone. If you have a critical or abnormal lab result, we will notify you by phone as soon as possible.  Submit refill requests through [employer] or call your pharmacy and they will forward the refill request to us. Please allow 3 business days for your refill to be completed.          Additional Information About Your Visit        Care EveryWhere ID     This is your Care EveryWhere ID. This could be used by other organizations to access your [place] medical records  [id number]         Blood Pressure from Last 3 Encounters:   09/14/18 132/86   04/02/18 138/87   09/29/17 117/78    Weight from Last 3 Encounters:   09/14/18 80 kg (176 lb 5.9 oz)   04/02/18 77.6 kg (171 lb 1.2 oz)   09/29/17 77 kg (169 lb 12.1 oz)              We Performed the Following     Allergy Shot: Two or more injections        Primary Care Provider Office Phone # Fax #    [facility] [phone] [address] [facility] [address]        Equal Access to Services     EDUIN PENNINGTON : Elsie Lyle, christine zuluaga, josiah sandoval. So [facility]  [phone].    ATENCIÓN: Si rolando jasso, tiene a spencer disposición servicios gratuitos de asistencia lingüística. Mushtaq elena [phone].    We comply with applicable federal civil rights laws and [place] laws. We do not discriminate on the basis of race, color, national origin, age, disability, sex, sexual orientation, or gender identity.            Thank you!     Thank you for choosing [facility]  for your care. Our goal is always to provide you with excellent care. Hearing back from our patients is one way we can continue to improve our services. Please take a few minutes to complete the written survey that you may receive in the mail after your visit with us. Thank you!             Your Updated Medication List - Protect others around you: Learn how to safely use, store and throw away your medicines at www.disposemymeds.org.          This list is accurate as of 12/7/18  4:40 PM.  Always use your most recent med list.                   Brand Name Dispense Instructions for use Diagnosis    ADVIL 200 MG capsule   Generic drug:  ibuprofen      Take 200 mg by mouth every 4 hours as needed Reported on 4/28/2017        albuterol 108 (90 Base) MCG/ACT inhaler    PROAIR HFA/PROVENTIL HFA/VENTOLIN HFA     Inhale 2 puffs into the lungs every 4 hours as needed for shortness of breath / dyspnea or wheezing        EPINEPHrine 0.3 MG/0.3ML injection 2-pack    AUVI-Q    2 mL    Inject 0.3 mLs (0.3 mg) into the muscle as needed for anaphylaxis    Seasonal allergic rhinitis due to pollen, Non-seasonal allergic rhinitis due to animal hair and dander       fluticasone 50 MCG/ACT nasal spray    FLONASE    1 Bottle    Spray 2 sprays into both nostrils daily    Atopic rhinitis       ORDER FOR ALLERGEN IMMUNOTHERAPY 5 mL vial     5 mL    Name of Mix: Mix #1  Cat, Dog Cat Hair, Standardized 10,000 BAU/mL, ALK  2.0 ml Dog Hair Dander, A. P.  1:100 w/v, HS  1.0 ml  Diluent: HSA qs to 5ml    Non-seasonal allergic rhinitis  due to animal hair and dander, Chronic allergic conjunctivitis, Need for desensitization to allergens, Chronic seasonal allergic rhinitis due to pollen       ORDER FOR ALLERGEN IMMUNOTHERAPY 5 mL vial     5 mL    Name of Mix: Mix #2  Tree  Birch Mix PRW 1:20 w/v, HS  0.5 ml Boxelder-Maple Mix BHR (Boxelder Hard Red) 1:20 w/v, HS  0.5 ml [place] Mix RW 1:20 w/v, HS 0.5 ml Oak Mix RVW 1:20 w/v, HS 0.5 ml [place] Tree, Black 1:20 w/v, HS 0.5 ml [place], Black 1:20 w/v, HS 0.5 ml Diluent: HSA qs to 5ml    Chronic seasonal allergic rhinitis due to pollen, Need for desensitization to allergens, Non-seasonal allergic rhinitis due to animal hair and dander, Chronic allergic conjunctivitis

## 2019-01-03 ENCOUNTER — ALLIED HEALTH/NURSE VISIT (OUTPATIENT)
Dept: ALLERGY | Facility: CLINIC | Age: 29
End: 2019-01-03
Payer: COMMERCIAL

## 2019-01-03 DIAGNOSIS — J30.9 ALLERGIC RHINITIS: Primary | ICD-10-CM

## 2019-01-03 PROCEDURE — 99207 ZZC DROP WITH A PROCEDURE: CPT

## 2019-01-03 PROCEDURE — 95117 IMMUNOTHERAPY INJECTIONS: CPT

## 2019-02-01 ENCOUNTER — ALLIED HEALTH/NURSE VISIT (OUTPATIENT)
Dept: ALLERGY | Facility: CLINIC | Age: 29
End: 2019-02-01
Payer: COMMERCIAL

## 2019-02-01 ENCOUNTER — TELEPHONE (OUTPATIENT)
Dept: ALLERGY | Facility: CLINIC | Age: 29
End: 2019-02-01

## 2019-02-01 DIAGNOSIS — J30.9 ALLERGIC RHINITIS: Primary | ICD-10-CM

## 2019-02-01 PROCEDURE — 95117 IMMUNOTHERAPY INJECTIONS: CPT

## 2019-02-01 PROCEDURE — 99207 ZZC DROP WITH A PROCEDURE: CPT

## 2019-02-01 NOTE — TELEPHONE ENCOUNTER
Called and left detailed message pt is on day 29 and will need his injection by Thursday 2/7. If he should come in on Friday 2/8 (day 36) he would need to drop back one dose.    Vianney Almanza RN

## 2019-02-01 NOTE — TELEPHONE ENCOUNTER
Reason for Call:  Other     Detailed comments: Pt had his last injection on 01/03 - top dose. Wants to know how long before he should schedule next injection - today or can he wait until next Friday (does not want to go backwards with injection dose)? - Please advise    Phone Number Patient can be reached at: Home number on file [phone] (home)    Best Time: Any    Can we leave a detailed message on this number? YES    Call taken on 2/1/2019 at 8:51 AM by Denise Behrendt

## 2019-03-08 ENCOUNTER — ALLIED HEALTH/NURSE VISIT (OUTPATIENT)
Dept: ALLERGY | Facility: CLINIC | Age: 29
End: 2019-03-08
Payer: COMMERCIAL

## 2019-03-08 DIAGNOSIS — J30.9 ALLERGIC RHINITIS: Primary | ICD-10-CM

## 2019-03-08 PROCEDURE — 95117 IMMUNOTHERAPY INJECTIONS: CPT

## 2019-03-08 PROCEDURE — 99207 ZZC DROP WITH A PROCEDURE: CPT

## 2019-04-12 ENCOUNTER — ALLIED HEALTH/NURSE VISIT (OUTPATIENT)
Dept: ALLERGY | Facility: CLINIC | Age: 29
End: 2019-04-12
Payer: COMMERCIAL

## 2019-04-12 DIAGNOSIS — J30.9 ALLERGIC RHINITIS: Primary | ICD-10-CM

## 2019-04-12 PROCEDURE — 95117 IMMUNOTHERAPY INJECTIONS: CPT

## 2019-04-12 PROCEDURE — 99207 ZZC DROP WITH A PROCEDURE: CPT

## 2019-05-10 ENCOUNTER — ALLIED HEALTH/NURSE VISIT (OUTPATIENT)
Dept: ALLERGY | Facility: CLINIC | Age: 29
End: 2019-05-10
Payer: COMMERCIAL

## 2019-05-10 DIAGNOSIS — J30.9 ALLERGIC RHINITIS: Primary | ICD-10-CM

## 2019-05-10 DIAGNOSIS — J30.81 NON-SEASONAL ALLERGIC RHINITIS DUE TO ANIMAL HAIR AND DANDER: ICD-10-CM

## 2019-05-10 DIAGNOSIS — J30.1 CHRONIC SEASONAL ALLERGIC RHINITIS DUE TO POLLEN: ICD-10-CM

## 2019-05-10 DIAGNOSIS — H10.45 CHRONIC ALLERGIC CONJUNCTIVITIS: ICD-10-CM

## 2019-05-10 DIAGNOSIS — Z51.6 NEED FOR DESENSITIZATION TO ALLERGENS: ICD-10-CM

## 2019-05-10 PROCEDURE — 95117 IMMUNOTHERAPY INJECTIONS: CPT

## 2019-05-10 PROCEDURE — 99207 ZZC DROP WITH A PROCEDURE: CPT

## 2019-05-10 NOTE — TELEPHONE ENCOUNTER
ALLERGY SOLUTION RE-ORDER REQUEST    Dmitri Mcguire 1990 MRN: [id number]    DATE NEEDED:  05/24/19  Vial Color Content   Top Dose   Last Dose Vial Size  Red 1:1 Cat, Dog   Red 1:1 0.5   Red 1:10.5 5mL  Red 1:1 Trees   Red 1:1 0.5   Red 1:10.5 5mL      Serum reorder consent signed and patient/parent was advised that new serums would be ordered through the pharmacy and billed to their insurance company when they arrive in clinic. No    Shot Clinic Location:  [place]  Ship to Location: [place]  Serum billed to:  [place]    Special Instructions:        Updated Prescription Needed: No      Requester Signature  [place]

## 2019-05-24 DIAGNOSIS — H10.45 CHRONIC ALLERGIC CONJUNCTIVITIS: ICD-10-CM

## 2019-05-24 DIAGNOSIS — J30.1 SEASONAL ALLERGIC RHINITIS DUE TO POLLEN: Primary | ICD-10-CM

## 2019-05-24 DIAGNOSIS — J30.81 NON-SEASONAL ALLERGIC RHINITIS DUE TO ANIMAL HAIR AND DANDER: ICD-10-CM

## 2019-05-24 PROCEDURE — 95165 ANTIGEN THERAPY SERVICES: CPT | Performed by: ALLERGY & IMMUNOLOGY

## 2019-05-24 NOTE — TELEPHONE ENCOUNTER
Allergy serums received at [place].     Vials received below:    Vial Color Content                      Vial Size Expiration Date  Red 1:1 Cat, Dog 5 mL  5/20/2020  Red 1:1 Trees 5 mL  5/20/2020      Signature  Kriss Hamilton

## 2019-05-24 NOTE — PROGRESS NOTES
Allergy serums billed at [place].     Vials received below:    Vial Color Content                      Vial Size Expiration Date  Red 1:1 Cat, Dog 5 mL  5/20/2020  Red 1:1 Trees 5 mL  5/20/2020    Original Refill encounter date: 5/10/2010      Signature  Kriss Hamilton

## 2019-06-11 ENCOUNTER — ALLIED HEALTH/NURSE VISIT (OUTPATIENT)
Dept: ALLERGY | Facility: CLINIC | Age: 29
End: 2019-06-11
Payer: COMMERCIAL

## 2019-06-11 DIAGNOSIS — J30.9 ALLERGIC RHINITIS: Primary | ICD-10-CM

## 2019-06-11 PROCEDURE — 99207 ZZC DROP WITH A PROCEDURE: CPT

## 2019-06-11 PROCEDURE — 95117 IMMUNOTHERAPY INJECTIONS: CPT

## 2019-06-24 ENCOUNTER — ALLIED HEALTH/NURSE VISIT (OUTPATIENT)
Dept: ALLERGY | Facility: CLINIC | Age: 29
End: 2019-06-24
Payer: COMMERCIAL

## 2019-06-24 DIAGNOSIS — Z51.6 NEED FOR DESENSITIZATION TO ALLERGENS: Primary | ICD-10-CM

## 2019-06-24 PROCEDURE — 99207 ZZC DROP WITH A PROCEDURE: CPT

## 2019-06-24 PROCEDURE — 95117 IMMUNOTHERAPY INJECTIONS: CPT

## 2019-07-05 ENCOUNTER — ALLIED HEALTH/NURSE VISIT (OUTPATIENT)
Dept: ALLERGY | Facility: CLINIC | Age: 29
End: 2019-07-05
Payer: COMMERCIAL

## 2019-07-05 DIAGNOSIS — Z51.6 NEED FOR DESENSITIZATION TO ALLERGENS: Primary | ICD-10-CM

## 2019-07-05 PROCEDURE — 95117 IMMUNOTHERAPY INJECTIONS: CPT

## 2019-07-05 PROCEDURE — 99207 ZZC DROP WITH A PROCEDURE: CPT

## 2019-08-09 ENCOUNTER — ALLIED HEALTH/NURSE VISIT (OUTPATIENT)
Dept: ALLERGY | Facility: CLINIC | Age: 29
End: 2019-08-09
Payer: COMMERCIAL

## 2019-08-09 DIAGNOSIS — Z51.6 NEED FOR DESENSITIZATION TO ALLERGENS: Primary | ICD-10-CM

## 2019-08-09 PROCEDURE — 95117 IMMUNOTHERAPY INJECTIONS: CPT

## 2019-08-09 PROCEDURE — 99207 ZZC DROP WITH A PROCEDURE: CPT

## 2019-09-13 ENCOUNTER — ALLIED HEALTH/NURSE VISIT (OUTPATIENT)
Dept: ALLERGY | Facility: CLINIC | Age: 29
End: 2019-09-13
Payer: COMMERCIAL

## 2019-09-13 DIAGNOSIS — Z51.6 NEED FOR DESENSITIZATION TO ALLERGENS: Primary | ICD-10-CM

## 2019-09-13 PROCEDURE — 95117 IMMUNOTHERAPY INJECTIONS: CPT

## 2019-09-13 PROCEDURE — 99207 ZZC DROP WITH A PROCEDURE: CPT

## 2019-10-11 ENCOUNTER — ALLIED HEALTH/NURSE VISIT (OUTPATIENT)
Dept: ALLERGY | Facility: CLINIC | Age: 29
End: 2019-10-11
Payer: COMMERCIAL

## 2019-10-11 DIAGNOSIS — Z51.6 NEED FOR DESENSITIZATION TO ALLERGENS: Primary | ICD-10-CM

## 2019-10-11 PROCEDURE — 95117 IMMUNOTHERAPY INJECTIONS: CPT

## 2019-10-11 PROCEDURE — 99207 ZZC DROP WITH A PROCEDURE: CPT

## 2019-10-22 ENCOUNTER — OFFICE VISIT (OUTPATIENT)
Dept: FAMILY MEDICINE | Facility: CLINIC | Age: 29
End: 2019-10-22
Payer: COMMERCIAL

## 2019-10-22 VITALS
SYSTOLIC BLOOD PRESSURE: 118 MMHG | HEART RATE: 71 BPM | RESPIRATION RATE: 13 BRPM | WEIGHT: 177 LBS | TEMPERATURE: 98.4 F | DIASTOLIC BLOOD PRESSURE: 70 MMHG | HEIGHT: 71 IN | BODY MASS INDEX: 24.78 KG/M2 | OXYGEN SATURATION: 98 %

## 2019-10-22 DIAGNOSIS — M25.50 ARTHRALGIA, UNSPECIFIED JOINT: ICD-10-CM

## 2019-10-22 DIAGNOSIS — M67.442 DIGITAL MUCINOUS CYST OF FINGER OF LEFT HAND: Primary | ICD-10-CM

## 2019-10-22 LAB
BASOPHILS # BLD AUTO: 0 10E9/L (ref 0–0.2)
BASOPHILS NFR BLD AUTO: 0.3 %
CRP SERPL-MCNC: <2.9 MG/L (ref 0–8)
DIFFERENTIAL METHOD BLD: NORMAL
EOSINOPHIL # BLD AUTO: 0.5 10E9/L (ref 0–0.7)
EOSINOPHIL NFR BLD AUTO: 7 %
ERYTHROCYTE [DISTWIDTH] IN BLOOD BY AUTOMATED COUNT: 12.6 % (ref 10–15)
ERYTHROCYTE [SEDIMENTATION RATE] IN BLOOD BY WESTERGREN METHOD: 5 MM/H (ref 0–15)
HCT VFR BLD AUTO: 44.7 % (ref 40–53)
HGB BLD-MCNC: 15.6 G/DL (ref 13.3–17.7)
LYMPHOCYTES # BLD AUTO: 2.2 10E9/L (ref 0.8–5.3)
LYMPHOCYTES NFR BLD AUTO: 29.7 %
MCH RBC QN AUTO: 30.8 PG (ref 26.5–33)
MCHC RBC AUTO-ENTMCNC: 34.9 G/DL (ref 31.5–36.5)
MCV RBC AUTO: 88 FL (ref 78–100)
MONOCYTES # BLD AUTO: 0.7 10E9/L (ref 0–1.3)
MONOCYTES NFR BLD AUTO: 9.7 %
NEUTROPHILS # BLD AUTO: 3.9 10E9/L (ref 1.6–8.3)
NEUTROPHILS NFR BLD AUTO: 53.3 %
PLATELET # BLD AUTO: 162 10E9/L (ref 150–450)
RBC # BLD AUTO: 5.07 10E12/L (ref 4.4–5.9)
WBC # BLD AUTO: 7.4 10E9/L (ref 4–11)

## 2019-10-22 PROCEDURE — 86431 RHEUMATOID FACTOR QUANT: CPT | Performed by: NURSE PRACTITIONER

## 2019-10-22 PROCEDURE — 86200 CCP ANTIBODY: CPT | Performed by: NURSE PRACTITIONER

## 2019-10-22 PROCEDURE — 99214 OFFICE O/P EST MOD 30 MIN: CPT | Performed by: NURSE PRACTITIONER

## 2019-10-22 PROCEDURE — 86618 LYME DISEASE ANTIBODY: CPT | Performed by: NURSE PRACTITIONER

## 2019-10-22 PROCEDURE — 36415 COLL VENOUS BLD VENIPUNCTURE: CPT | Performed by: NURSE PRACTITIONER

## 2019-10-22 PROCEDURE — 85652 RBC SED RATE AUTOMATED: CPT | Performed by: NURSE PRACTITIONER

## 2019-10-22 PROCEDURE — 85025 COMPLETE CBC W/AUTO DIFF WBC: CPT | Performed by: NURSE PRACTITIONER

## 2019-10-22 PROCEDURE — 86140 C-REACTIVE PROTEIN: CPT | Performed by: NURSE PRACTITIONER

## 2019-10-22 ASSESSMENT — ASTHMA QUESTIONNAIRES
QUESTION_1 LAST FOUR WEEKS HOW MUCH OF THE TIME DID YOUR ASTHMA KEEP YOU FROM GETTING AS MUCH DONE AT WORK, SCHOOL OR AT HOME: NONE OF THE TIME
QUESTION_5 LAST FOUR WEEKS HOW WOULD YOU RATE YOUR ASTHMA CONTROL: COMPLETELY CONTROLLED
QUESTION_4 LAST FOUR WEEKS HOW OFTEN HAVE YOU USED YOUR RESCUE INHALER OR NEBULIZER MEDICATION (SUCH AS ALBUTEROL): NOT AT ALL
QUESTION_3 LAST FOUR WEEKS HOW OFTEN DID YOUR ASTHMA SYMPTOMS (WHEEZING, COUGHING, SHORTNESS OF BREATH, CHEST TIGHTNESS OR PAIN) WAKE YOU UP AT NIGHT OR EARLIER THAN USUAL IN THE MORNING: NOT AT ALL
QUESTION_2 LAST FOUR WEEKS HOW OFTEN HAVE YOU HAD SHORTNESS OF BREATH: NOT AT ALL
ACT_TOTALSCORE: 25

## 2019-10-22 ASSESSMENT — MIFFLIN-ST. JEOR: SCORE: 1800

## 2019-10-22 NOTE — PATIENT INSTRUCTIONS
Patient Education     Gout Diet  Gout is a painful condition caused by an excess of uric acid, a waste product made by the body. Uric acid forms crystals that collect in the joints. The immune response to these crystals brings on symptoms of joint pain and swelling. This is called a gout attack. Often, medications and diet changes are combined to manage gout. Below are some guidelines for changing your diet to help you manage gout and prevent attacks. Your healthcare provider will help you determine the best eating plan for you.  Eating to manage gout  Weight loss for those who are overweight may help reduce gout attacks.  Eat less of these foods  Eating too many foods containing purines may raise the levels of uric acid in your body. This raises your risk for a gout attack. Try to limit these foods and drinks:    Alcohol, such as beer and red wine. You may be told to avoid alcohol completely.    Soft drinks that contain sugar or high fructose corn syrup    Certain fish, including anchovies, sardines, fish eggs, and herring    Shellfish    Certain meats, such as red meat, hot dogs, luncheon meats, and turkey    Organ meats, such as liver, kidneys, and sweetbreads    Legumes, such as dried beans and peas    Other high fat foods such as gravy, whole milk, and high fat cheeses    Vegetables such as asparagus, cauliflower, spinach, and mushrooms used to be thought to contribute to an increased risk for a gout attack, but recent studies show that high purine vegetables don't increase the risk for a gout attack.  Eat more of these foods  Other foods may be helpful for people with gout. Add some of these foods to your diet:    Cherries contain chemicals that may lower uric acid.    Omega fatty acids. These are found in some fatty fish such as salmon, certain oils (flax, olive, or nut), and nuts themselves. Omega fatty acids may help prevent inflammation due to gout.    Dairy products that are low-fat or fat-free, such as  cheese and yogurt    Complex carbohydrate foods, including whole grains, brown rice, oats, and beans    Coffee, in moderation    Water, approximately 64 ounces per day  Follow-up care  Follow up with your healthcare provider as advised.  When to seek medical advice  Call your healthcare provider right away if any of these occur:    Return of gout symptoms, usually at night:    Severe pain, swelling, and heat in a joint, especially the base of the big toe    Affected joint is hard to move    Skin of the affected joint is purple or red    Fever of 100.4 F (38 C) or higher    Pain that doesn't get better even with prescribed medicine   Date Last Reviewed: 6/1/2018 2000-2018 [employer]. [address] [place] [address], [place] [address]. All rights reserved. This information is not intended as a substitute for professional medical care. Always follow your healthcare professional's instructions.           Patient Education     Eating to Prevent Gout  Gout is a painful form of arthritis caused by an excess of uric acid. This is a waste product made by the body. It builds up in the body and forms crystals that collect in the joints, causing a gout attack. Alcohol and certain foods can trigger a gout attack. Below are some guidelines for changing your diet to help you manage gout. Your healthcare provider can work with you to determine the best eating plan for you. Know that diet is only one part of managing gout. Take your medicines as prescribed and follow the other guidelines your healthcare provider has given you.  Foods to limit  Eating too many foods containing purines may increase the levels of uric acid in your body and increase your risk for a gout attack. It may be best to limit these high-purine foods:    Alcohol (beer and red wine). You may be told to avoid alcohol completely.    Certain fish (anchovies, sardines, fish roes, herring, tuna, mussels, codfish, scallops, trout, and felipe)    Certain meats  (red meat, processed meat, wu, turkey, wild game, and goose)    Sauces and gravies made with meat    Organ meats (such as liver, kidneys, sweetbreads, and tripe)    Legumes (such as dried beans and peas)    Mushrooms, spinach, asparagus, and cauliflower    Yeast and yeast extract supplements  Foods to try  Some foods may be helpful for people with gout. You may want to try adding some of the following foods to your diet:    Dark berries. These include blueberries, blackberries, and cherries. These berries contain chemicals that may lower uric acid.    Tofu. Tofu, which is made from soy, is a good source of protein. Studies have shown that it may be a better choice than meat for people with gout.    Omega fatty acids. These acids are found in fatty fish (such as salmon), certain oils (such as flax, olive, or nut oils), or nuts. They may help prevent inflammation due to gout.  The following guidelines are recommended by the American Medical Association for people with gout. Your diet should be:    High in fiber, whole grains, fruits, and vegetables.    Low in protein (15% of calories should come from protein. Choose lean sources, such as soy, lean meats, and poultry).    Low in fat (no more than 30% of calories should come from fat, with only 10% coming from animal, or saturated, fat).   Date Last Reviewed: 11/1/2017 2000-2018 [employer]. [address] [place] [address]. All rights reserved. This information is not intended as a substitute for professional medical care. Always follow your healthcare professional's instructions.           Patient Education     Gout    Gout is an inflammation of a joint due to a build-up of gout crystals in the joint fluid. This occurs when there is an excess of uric acid (a normal waste product) in the body. Uric acid builds up in the body when the kidneys are unable to filter enough of it from the blood. This may occur with age. It is also associated  with kidney disease. Gout occurs more often in people with obesity, diabetes, high blood pressure, or high levels of fats in the blood. It may run in families. Gout tends to come and go. A flare up of gout is called an attack. Drinking alcohol or eating certain foods (such as shellfish or foods with additives such as high-fructose corn syrup) may increase uric acid levels in the blood and cause a gout attack.  During a gout attack, the affected joint may become a hot, red, swollen and painful. If you have had one attack of gout, you are likely to have another. An attack of gout can be treated with medicine. If these attacks become frequent, a daily medicine may be prescribed to help the kidneys remove uric acid from the body.  Home care  During a gout attack:    Rest painful joints. If gout affects the joints of your foot or leg, you may want to use crutches for the first few days to keep from bearing weight on the affected joint.    When sitting or lying down, raise the painful joint to a level higher than your heart.    Apply an ice pack (ice cubes in a plastic bag wrapped in a thin towel) over the injured area for 20 minutes every 1 to 2 hours the first day for pain relief. Continue this 3 to 4 times a day for swelling and pain.    Avoid alcohol and foods listed below (see Preventing attacks) during a gout attack. Drink extra fluid to help flush the uric acid through your kidneys.    If you were prescribed a medicine to treat gout, take it as your healthcare provider has instructed. Don't skip doses.    Take anti-inflammatory medicine as directed.     If pain medicines have been prescribed, take them exactly as directed.    Preventing attacks    Minimize or avoid alcohol use. Excess alcohol intake can cause a gout attack.    Limit these foods and beverages:  ? Organ meats, such as kidneys and liver  ? Certain seafoods (anchovies, sardines, shrimp, scallops, herring, mackerel)  ? Wild game, meat extracts and meat  gravies  ? Foods and beverages sweetened with high-fructose corn syrup, such as sodas    Eat a healthy diet including low-fat and nonfat dairy, whole grains, and vegetables.    If you are overweight, talk to your healthcare provider about a weight reduction plan. Avoid fasting or extreme low calorie diets (less than 900 calories per day). This will increase uric acid levels in the body.    If you have diabetes or high blood pressure, work with your doctor to manage these conditions.    Protect the joint from injury. Trauma can trigger a gout attack.  Follow-up care  Follow up with your healthcare provider, or as advised.   When to seek medical advice  Call your healthcare provider if you have any of the following:    Fever over 100.4 F (38. C) with worsening joint pain    Increasing redness around the joint    Pain developing in another joint    Repeated vomiting, abdominal pain, or blood in the vomit or stool (black or red color)  Date Last Reviewed: 3/1/2017    5210-8556 [employer]. [address] [place] [address], [place] [address]. All rights reserved. This information is not intended as a substitute for professional medical care. Always follow your healthcare professional's instructions.

## 2019-10-22 NOTE — PROGRESS NOTES
Subjective     Dmitri Mcguire is a 29 year old male who presents to clinic today for the following health issues:    Est care: medical chart reviewed with patient today.     HPI   Joint Pain- multiple areas of body    Onset: one month - random swelling of joints, redness, and painful on right foot/toes and left  hand left hand index finger symptoms are painful and he feels a lump on the back of his finger. Knee and elbow ache at times.     Description:   Location: right hand, has lumps and pain  Character: joint pain    Intensity: moderate    Progression of Symptoms: intermittent    Accompanying Signs & Symptoms:  Other symptoms: none    History:   Previous similar pain: no       Precipitating factors:   Trauma or overuse: no     Alleviating factors:  Improved by: nothing    Therapies Tried and outcome: none      -------------------------------------    Patient Active Problem List   Diagnosis     Intermittent asthma, uncomplicated     Seasonal allergic rhinitis due to pollen     Non-seasonal allergic rhinitis due to animal hair and dander     Chronic allergic conjunctivitis     No past surgical history on file.    Social History     Tobacco Use     Smoking status: Former Smoker     Years: 3.00     Types: Cigarettes, Dip, chew, snus or snuff     Smokeless tobacco: Former User     Types: Chew   Substance Use Topics     Alcohol use: Yes     Family History   Problem Relation Age of Onset     Heart Disease Maternal Grandfather      Cardiovascular Maternal Grandfather         stents           -------------------------------------  Reviewed and updated as needed this visit by Provider         Review of Systems   ROS COMP: Constitutional, HEENT, cardiovascular, pulmonary, GI, , musculoskeletal, neuro, skin, endocrine and psych systems are negative, except as otherwise noted.      Objective    There were no vitals taken for this visit.  There is no height or weight on file to calculate BMI.  Physical Exam   GENERAL: healthy,  alert and no distress  RESP: lungs clear to auscultation - no rales, rhonchi or wheezes  CV: regular rate and rhythm, normal S1 S2, no S3 or S4, no murmur, click or rub, no peripheral edema and peripheral pulses strong  MS: no gross musculoskeletal defects noted, no edema  SKIN: no suspicious lesions or rashes    Diagnostic Test Results:  Labs reviewed in Epic        Assessment & Plan     1. Digital mucinous cyst of finger of left hand    - SPORTS MEDICINE REFERRAL    2. Arthralgia, unspecified joint  ? RHEUMATOID ARTHRITIS vs Gout vs lyme  - Rheumatoid factor  - Erythrocyte sedimentation rate auto  - CRP inflammation  - CBC with platelets differential  - Cyclic Citrullinated Peptide IgG ([facility])  - Lyme Disease Nelia with reflex to WB Serum       Patient Instructions       Patient Education     Gout Diet  Gout is a painful condition caused by an excess of uric acid, a waste product made by the body. Uric acid forms crystals that collect in the joints. The immune response to these crystals brings on symptoms of joint pain and swelling. This is called a gout attack. Often, medications and diet changes are combined to manage gout. Below are some guidelines for changing your diet to help you manage gout and prevent attacks. Your healthcare provider will help you determine the best eating plan for you.  Eating to manage gout  Weight loss for those who are overweight may help reduce gout attacks.  Eat less of these foods  Eating too many foods containing purines may raise the levels of uric acid in your body. This raises your risk for a gout attack. Try to limit these foods and drinks:    Alcohol, such as beer and red wine. You may be told to avoid alcohol completely.    Soft drinks that contain sugar or high fructose corn syrup    Certain fish, including anchovies, sardines, fish eggs, and herring    Shellfish    Certain meats, such as red meat, hot dogs, luncheon meats, and turkey    Organ meats, such as liver, kidneys,  and sweetbreads    Legumes, such as dried beans and peas    Other high fat foods such as gravy, whole milk, and high fat cheeses    Vegetables such as asparagus, cauliflower, spinach, and mushrooms used to be thought to contribute to an increased risk for a gout attack, but recent studies show that high purine vegetables don't increase the risk for a gout attack.  Eat more of these foods  Other foods may be helpful for people with gout. Add some of these foods to your diet:    Cherries contain chemicals that may lower uric acid.    Omega fatty acids. These are found in some fatty fish such as salmon, certain oils (flax, olive, or nut), and nuts themselves. Omega fatty acids may help prevent inflammation due to gout.    Dairy products that are low-fat or fat-free, such as cheese and yogurt    Complex carbohydrate foods, including whole grains, brown rice, oats, and beans    Coffee, in moderation    Water, approximately 64 ounces per day  Follow-up care  Follow up with your healthcare provider as advised.  When to seek medical advice  Call your healthcare provider right away if any of these occur:    Return of gout symptoms, usually at night:    Severe pain, swelling, and heat in a joint, especially the base of the big toe    Affected joint is hard to move    Skin of the affected joint is purple or red    Fever of 100.4 F (38 C) or higher    Pain that doesn't get better even with prescribed medicine   Date Last Reviewed: 6/1/2018 2000-2018 [employer]. [address] [place] [address]. All rights reserved. This information is not intended as a substitute for professional medical care. Always follow your healthcare professional's instructions.           Patient Education     Eating to Prevent Gout  Gout is a painful form of arthritis caused by an excess of uric acid. This is a waste product made by the body. It builds up in the body and forms crystals that collect in the joints, causing a gout  attack. Alcohol and certain foods can trigger a gout attack. Below are some guidelines for changing your diet to help you manage gout. Your healthcare provider can work with you to determine the best eating plan for you. Know that diet is only one part of managing gout. Take your medicines as prescribed and follow the other guidelines your healthcare provider has given you.  Foods to limit  Eating too many foods containing purines may increase the levels of uric acid in your body and increase your risk for a gout attack. It may be best to limit these high-purine foods:    Alcohol (beer and red wine). You may be told to avoid alcohol completely.    Certain fish (anchovies, sardines, fish roes, herring, tuna, mussels, codfish, scallops, trout, and felipe)    Certain meats (red meat, processed meat, wu, turkey, wild game, and goose)    Sauces and gravies made with meat    Organ meats (such as liver, kidneys, sweetbreads, and tripe)    Legumes (such as dried beans and peas)    Mushrooms, spinach, asparagus, and cauliflower    Yeast and yeast extract supplements  Foods to try  Some foods may be helpful for people with gout. You may want to try adding some of the following foods to your diet:    Dark berries. These include blueberries, blackberries, and cherries. These berries contain chemicals that may lower uric acid.    Tofu. Tofu, which is made from soy, is a good source of protein. Studies have shown that it may be a better choice than meat for people with gout.    Omega fatty acids. These acids are found in fatty fish (such as salmon), certain oils (such as flax, olive, or nut oils), or nuts. They may help prevent inflammation due to gout.  The following guidelines are recommended by the American Medical Association for people with gout. Your diet should be:    High in fiber, whole grains, fruits, and vegetables.    Low in protein (15% of calories should come from protein. Choose lean sources, such as soy, lean  meats, and poultry).    Low in fat (no more than 30% of calories should come from fat, with only 10% coming from animal, or saturated, fat).   Date Last Reviewed: 11/1/2017 2000-2018 [employer]. [address] [place] [address]. All rights reserved. This information is not intended as a substitute for professional medical care. Always follow your healthcare professional's instructions.           Patient Education     Gout    Gout is an inflammation of a joint due to a build-up of gout crystals in the joint fluid. This occurs when there is an excess of uric acid (a normal waste product) in the body. Uric acid builds up in the body when the kidneys are unable to filter enough of it from the blood. This may occur with age. It is also associated with kidney disease. Gout occurs more often in people with obesity, diabetes, high blood pressure, or high levels of fats in the blood. It may run in families. Gout tends to come and go. A flare up of gout is called an attack. Drinking alcohol or eating certain foods (such as shellfish or foods with additives such as high-fructose corn syrup) may increase uric acid levels in the blood and cause a gout attack.  During a gout attack, the affected joint may become a hot, red, swollen and painful. If you have had one attack of gout, you are likely to have another. An attack of gout can be treated with medicine. If these attacks become frequent, a daily medicine may be prescribed to help the kidneys remove uric acid from the body.  Home care  During a gout attack:    Rest painful joints. If gout affects the joints of your foot or leg, you may want to use crutches for the first few days to keep from bearing weight on the affected joint.    When sitting or lying down, raise the painful joint to a level higher than your heart.    Apply an ice pack (ice cubes in a plastic bag wrapped in a thin towel) over the injured area for 20 minutes every 1 to 2 hours the  first day for pain relief. Continue this 3 to 4 times a day for swelling and pain.    Avoid alcohol and foods listed below (see Preventing attacks) during a gout attack. Drink extra fluid to help flush the uric acid through your kidneys.    If you were prescribed a medicine to treat gout, take it as your healthcare provider has instructed. Don't skip doses.    Take anti-inflammatory medicine as directed.     If pain medicines have been prescribed, take them exactly as directed.    Preventing attacks    Minimize or avoid alcohol use. Excess alcohol intake can cause a gout attack.    Limit these foods and beverages:  ? Organ meats, such as kidneys and liver  ? Certain seafoods (anchovies, sardines, shrimp, scallops, herring, mackerel)  ? Wild game, meat extracts and meat gravies  ? Foods and beverages sweetened with high-fructose corn syrup, such as sodas    Eat a healthy diet including low-fat and nonfat dairy, whole grains, and vegetables.    If you are overweight, talk to your healthcare provider about a weight reduction plan. Avoid fasting or extreme low calorie diets (less than 900 calories per day). This will increase uric acid levels in the body.    If you have diabetes or high blood pressure, work with your doctor to manage these conditions.    Protect the joint from injury. Trauma can trigger a gout attack.  Follow-up care  Follow up with your healthcare provider, or as advised.   When to seek medical advice  Call your healthcare provider if you have any of the following:    Fever over 100.4 F (38. C) with worsening joint pain    Increasing redness around the joint    Pain developing in another joint    Repeated vomiting, abdominal pain, or blood in the vomit or stool (black or red color)  Date Last Reviewed: 3/1/2017    8623-4591 [employer]. [address] [place] [address]. All rights reserved. This information is not intended as a substitute for professional medical care. Always  follow your healthcare professional's instructions.               No follow-ups on file.    SHAKILA Fraser [facility]

## 2019-10-23 LAB
B BURGDOR IGG+IGM SER QL: 0.15 (ref 0–0.89)
CCP AB SER IA-ACNC: 1 U/ML
RHEUMATOID FACT SER NEPH-ACNC: <20 IU/ML (ref 0–20)

## 2019-10-23 ASSESSMENT — ASTHMA QUESTIONNAIRES: ACT_TOTALSCORE: 25

## 2019-11-03 ENCOUNTER — HEALTH MAINTENANCE LETTER (OUTPATIENT)
Age: 29
End: 2019-11-03

## 2019-11-12 ENCOUNTER — ALLIED HEALTH/NURSE VISIT (OUTPATIENT)
Dept: ALLERGY | Facility: CLINIC | Age: 29
End: 2019-11-12
Payer: COMMERCIAL

## 2019-11-12 DIAGNOSIS — Z51.6 NEED FOR DESENSITIZATION TO ALLERGENS: Primary | ICD-10-CM

## 2019-11-12 PROCEDURE — 99207 ZZC DROP WITH A PROCEDURE: CPT

## 2019-11-12 PROCEDURE — 95117 IMMUNOTHERAPY INJECTIONS: CPT

## 2019-12-13 ENCOUNTER — ALLIED HEALTH/NURSE VISIT (OUTPATIENT)
Dept: ALLERGY | Facility: CLINIC | Age: 29
End: 2019-12-13
Payer: COMMERCIAL

## 2019-12-13 DIAGNOSIS — Z51.6 NEED FOR DESENSITIZATION TO ALLERGENS: Primary | ICD-10-CM

## 2019-12-13 PROCEDURE — 95117 IMMUNOTHERAPY INJECTIONS: CPT

## 2019-12-13 PROCEDURE — 99207 ZZC DROP WITH A PROCEDURE: CPT

## 2020-01-14 ENCOUNTER — OFFICE VISIT (OUTPATIENT)
Dept: ALLERGY | Facility: CLINIC | Age: 30
End: 2020-01-14
Payer: COMMERCIAL

## 2020-01-14 ENCOUNTER — ALLIED HEALTH/NURSE VISIT (OUTPATIENT)
Dept: ALLERGY | Facility: CLINIC | Age: 30
End: 2020-01-14
Payer: COMMERCIAL

## 2020-01-14 VITALS
HEART RATE: 84 BPM | OXYGEN SATURATION: 98 % | HEIGHT: 68 IN | TEMPERATURE: 97.8 F | BODY MASS INDEX: 25.49 KG/M2 | WEIGHT: 168.21 LBS | SYSTOLIC BLOOD PRESSURE: 125 MMHG | DIASTOLIC BLOOD PRESSURE: 76 MMHG

## 2020-01-14 DIAGNOSIS — J30.81 NON-SEASONAL ALLERGIC RHINITIS DUE TO ANIMAL HAIR AND DANDER: ICD-10-CM

## 2020-01-14 DIAGNOSIS — J45.20 MILD INTERMITTENT ASTHMA WITHOUT COMPLICATION: Primary | ICD-10-CM

## 2020-01-14 DIAGNOSIS — J30.1 CHRONIC SEASONAL ALLERGIC RHINITIS DUE TO POLLEN: ICD-10-CM

## 2020-01-14 DIAGNOSIS — Z51.6 NEED FOR DESENSITIZATION TO ALLERGENS: ICD-10-CM

## 2020-01-14 DIAGNOSIS — H10.45 CHRONIC ALLERGIC CONJUNCTIVITIS: ICD-10-CM

## 2020-01-14 DIAGNOSIS — J30.1 SEASONAL ALLERGIC RHINITIS DUE TO POLLEN: ICD-10-CM

## 2020-01-14 DIAGNOSIS — Z91.09 OTHER ALLERGY, OTHER THAN TO MEDICINAL AGENTS: ICD-10-CM

## 2020-01-14 DIAGNOSIS — Z51.6 NEED FOR DESENSITIZATION TO ALLERGENS: Primary | ICD-10-CM

## 2020-01-14 LAB
FEF 25/75: NORMAL
FEV-1: NORMAL
FEV1/FVC: NORMAL
FVC: NORMAL

## 2020-01-14 PROCEDURE — 94010 BREATHING CAPACITY TEST: CPT | Performed by: ALLERGY & IMMUNOLOGY

## 2020-01-14 PROCEDURE — 99214 OFFICE O/P EST MOD 30 MIN: CPT | Mod: 25 | Performed by: ALLERGY & IMMUNOLOGY

## 2020-01-14 PROCEDURE — 95117 IMMUNOTHERAPY INJECTIONS: CPT

## 2020-01-14 PROCEDURE — 99207 ZZC DROP WITH A PROCEDURE: CPT

## 2020-01-14 RX ORDER — AZELASTINE 1 MG/ML
2 SPRAY, METERED NASAL 2 TIMES DAILY PRN
Qty: 30 ML | Refills: 3 | Status: SHIPPED | OUTPATIENT
Start: 2020-01-14 | End: 2021-02-09

## 2020-01-14 RX ORDER — FLUTICASONE PROPIONATE 50 MCG
2 SPRAY, SUSPENSION (ML) NASAL DAILY
Qty: 16 G | Refills: 3 | Status: SHIPPED | OUTPATIENT
Start: 2020-01-14

## 2020-01-14 RX ORDER — ALBUTEROL SULFATE 90 UG/1
2 AEROSOL, METERED RESPIRATORY (INHALATION) EVERY 4 HOURS PRN
Qty: 18 G | Refills: 3 | Status: SHIPPED | OUTPATIENT
Start: 2020-01-14 | End: 2021-02-09

## 2020-01-14 ASSESSMENT — ENCOUNTER SYMPTOMS
ADENOPATHY: 0
NAUSEA: 0
MYALGIAS: 0
EYE ITCHING: 0
DIARRHEA: 0
EYE REDNESS: 0
FEVER: 0
FATIGUE: 0
EYE DISCHARGE: 0
CHEST TIGHTNESS: 0
VOMITING: 0
ACTIVITY CHANGE: 0
ARTHRALGIAS: 0
FACIAL SWELLING: 0
SINUS PRESSURE: 0
HEADACHES: 0
COUGH: 0
JOINT SWELLING: 0
WHEEZING: 0

## 2020-01-14 ASSESSMENT — MIFFLIN-ST. JEOR: SCORE: 1703.01

## 2020-01-14 NOTE — TELEPHONE ENCOUNTER
ALLERGY SOLUTION RE-ORDER REQUEST    Dmitri Mcguire 1990 MRN: [id number]    DATE NEEDED:  01/28/20  Vial Color Content   Top Dose       Last Dose     Vial Size  Red 1:1 Cat, Dog   Red 1:1 0.5   Red 1:10.5 5mL  Red 1:1 Trees   Red 1:1 0.5   Red 1:10.5 5mL                          Serum reorder consent signed and patient/parent was advised that new serums would be ordered through the pharmacy and billed to their insurance company when they arrive in clinic. Yes    [facility] Location:  [place]  Ship to Location: [place]  Serum billed to:  [place]    Special Instructions:        Updated Prescription Needed: No      Requester Signature  [place] LPN

## 2020-01-14 NOTE — LETTER
1/14/2020         RE: Dmitri Mcguire  [id number] [facility] [address]        Dear Colleague,    Thank you for referring your patient, Dmitri Mcguire, to the [facility]. Please see a copy of my visit note below.    SUBJECTIVE:                                                                   Dmitri Mcguire presents today to our Allergy Clinic at [facility] for a follow up visit.    As you know, he is a 30 year old male with with allergic rhinoconjunctivitis and asthma.     Percutaneous skin puncture testing for aeroallergens performed on April 28, 2017 showed sensitivity for dog, cat, rabbit and tree pollen (Birch mix, [place], Oak, [place], and Black [place]). Borderline sensitivity for [place] noted.   On allergen immunotherapy. Cat/dog IT since 6/30/2017. Trees IT since 9/22/17.   Allergy Immunotherapy  Date/time of injection(s):  1/14/2020     Vial Color                               Content                                  Dose  Red 1:1                                   Cat, Dog                                  0. 50mL  Red 1:1                                   Trees                                       0. 50mL    He tolerates injections well without persistent large local reactions or systemic reactions.    Regarding his asthma, he states that it is well controlled. Dmitri is using albuterol HFA less than twice per week for rescue from chest symptoms. He is waking up less than twice per month due to chest symptoms. There has been no use of oral steroids since the last visit. The patient denies current chest tightness, cough, wheeze, or SOB.   He denies interval ED/urgent care/PCP/other specialist visits for asthma flare.    The patient reports at least 60% improvement in rhinoconjunctivitis symptoms with allergen immunotherapy. It has been stable. He is significantly better around the dogs. Before, he would have significant rhinoconjunctivitis symptoms,  hives, and even chest symptoms. These days, he develops nasal congestion, postnasal drip, rhinorrhea, sneezing, and itchy eyes only when he plays with the dog for a very long time. He admits that he is not entirely asymptomatic around the pets, but is still satisfied with the results.   He does not use any nasal sprays. He takes Allegra on rare occasions as needed.             Patient Active Problem List   Diagnosis     Intermittent asthma, uncomplicated     Seasonal allergic rhinitis due to pollen     Non-seasonal allergic rhinitis due to animal hair and dander     Chronic allergic conjunctivitis       Past Medical History:   Diagnosis Date     Allergic rhinitis due to other allergen      Mild intermittent asthma       Problem (# of Occurrences) Relation (Name,Age of Onset)    Cardiovascular (1) Maternal Grandfather: stents    Heart Disease (1) Maternal Grandfather        History reviewed. No pertinent surgical history.  Social History     Socioeconomic History     Marital status: Single     Spouse name: None     Number of children: None     Years of education: None     Highest education level: None   Occupational History     None   Social Needs     Financial resource strain: None     Food insecurity:     Worry: None     Inability: None     Transportation needs:     Medical: None     Non-medical: None   Tobacco Use     Smoking status: Former Smoker     Years: 3.00     Types: Cigarettes, Dip, chew, snus or snuff     Smokeless tobacco: Former User     Types: Chew   Substance and Sexual Activity     Alcohol use: Yes     Drug use: No     Sexual activity: Yes     Partners: Female     Birth control/protection: Condom   Lifestyle     Physical activity:     Days per week: None     Minutes per session: None     Stress: None   Relationships     Social connections:     Talks on phone: None     Gets together: None     Attends Scientologist service: None     Active member of club or organization: None     Attends meetings of clubs  or organizations: None     Relationship status: None     Intimate partner violence:     Fear of current or ex partner: None     Emotionally abused: None     Physically abused: None     Forced sexual activity: None   Other Topics Concern     Parent/sibling w/ CABG, MI or angioplasty before 65F 55M? No   Social History Narrative    January 14, 2020        ENVIRONMENTAL HISTORY: [place] The home is heated with a gas furnace. They does have central air conditioning. The patient's bedroom is furnished with carpeting in bedroom and fabric window coverings.  Pets inside the house include 1 dog. There is not history of cockroach or mice infestation. There are no smokers in the house.  The house does not have a damp basement.           Review of Systems   Constitutional: Negative for activity change, fatigue and fever.   HENT: Positive for congestion, postnasal drip and sneezing. Negative for dental problem, ear pain, facial swelling, nosebleeds and sinus pressure.    Eyes: Negative for discharge, redness and itching.   Respiratory: Negative for cough, chest tightness and wheezing.    Cardiovascular: Negative for chest pain.   Gastrointestinal: Negative for diarrhea, nausea and vomiting.   Musculoskeletal: Negative for arthralgias, joint swelling and myalgias.   Skin: Negative for rash.   Neurological: Negative for headaches.   Hematological: Negative for adenopathy.   Psychiatric/Behavioral: Negative for behavioral problems and self-injury.           Current Outpatient Medications:      albuterol (PROAIR HFA/PROVENTIL HFA/VENTOLIN HFA) 108 (90 Base) MCG/ACT inhaler, Inhale 2 puffs into the lungs every 4 hours as needed for shortness of breath / dyspnea or wheezing, Disp: 18 g, Rfl: 3     azelastine (ASTELIN) 0.1 % nasal spray, Spray 2 sprays into both nostrils 2 times daily as needed for rhinitis, Disp: 30 mL, Rfl: 3     fluticasone (FLONASE) 50 MCG/ACT nasal spray, Spray 2  sprays into both nostrils daily, Disp: 16 g, Rfl: 3     ibuprofen (ADVIL) 200 MG capsule, Take 200 mg by mouth every 4 hours as needed Reported on 4/28/2017, Disp: , Rfl:      ORDER FOR ALLERGEN IMMUNOTHERAPY, Name of Mix: Mix #1  Cat, Dog Cat Hair, Standardized 10,000 BAU/mL, ALK  2.0 ml Dog Hair Dander, A. P.  1:100 w/v, HS  1.0 ml  Diluent: HSA qs to 5ml, Disp: 5 mL, Rfl: PRN     ORDER FOR ALLERGEN IMMUNOTHERAPY, Name of Mix: Mix #2  Tree  Birch Mix PRW 1:20 w/v, HS  0.5 ml Boxelder-Maple Mix BHR (Boxelder Hard Red) 1:20 w/v, HS  0.5 ml [place] Mix RW 1:20 w/v, HS 0.5 ml Oak Mix RVW 1:20 w/v, HS 0.5 ml [place] Tree, Black 1:20 w/v, HS 0.5 ml [place], Black 1:20 w/v, HS 0.5 ml Diluent: HSA qs to 5ml, Disp: 5 mL, Rfl: PRN     EPINEPHrine (AUVI-Q) 0.3 MG/0.3ML injection 2-pack, Inject 0.3 mLs (0.3 mg) into the muscle as needed for anaphylaxis (Patient not taking: Reported on 9/14/2018), Disp: 2 mL, Rfl: 1     fluticasone (FLONASE) 50 MCG/ACT spray, Spray 2 sprays into both nostrils daily (Patient not taking: Reported on 9/29/2017), Disp: 1 Bottle, Rfl: 11  Immunization History   Administered Date(s) Administered     HEPA 08/25/2010     HepB 08/18/2004, 09/24/2004, 08/03/2005     Hib (PRP-T) 1990, 1990, 1990, 05/04/1993     Historical DTP/aP 1992, 1992, 1992, 10/05/1993, 05/14/1997     Influenza (IIV3) PF 11/27/2007, 12/01/2008, 10/05/2009, 09/17/2010     MMR 05/04/1993, 08/18/2004     Meningococcal (Menactra ) 08/25/2010     OPV, trivalent, live 1992, 1992, 10/05/1993     Poliovirus, inactivated (IPV) 05/14/1997     TDAP Vaccine (Adacel) 06/01/2017     Tetanus 08/18/2004     Allergies   Allergen Reactions     Amoxicillin Unknown     Ceclor [Cefaclor] Unknown     Erythrocin [Erythromycin] Diarrhea     Penicillins Unknown     OBJECTIVE:                                                                 /76 (BP Location: Left arm, Patient Position: Sitting, Cuff Size:  "Adult Regular)   Pulse 84   Temp 97.8  F (36.6  C) (Tympanic)   Ht 1.72 m (5' 7.72\")   Wt 76.3 kg (168 lb 3.4 oz)   SpO2 98%   BMI 25.79 kg/m           Physical Exam  Vitals signs and nursing note reviewed.   Constitutional:       General: He is not in acute distress.     Appearance: He is not diaphoretic.   HENT:      Head: Normocephalic and atraumatic.      Right Ear: Tympanic membrane, ear canal and external ear normal.      Left Ear: Tympanic membrane, ear canal and external ear normal.      Nose: No mucosal edema or rhinorrhea.      Right Turbinates: Not enlarged, swollen or pale.      Left Turbinates: Not enlarged, swollen or pale.      Mouth/Throat:      Lips: Pink.      Mouth: Mucous membranes are moist.      Pharynx: Oropharynx is clear. No pharyngeal swelling, oropharyngeal exudate or posterior oropharyngeal erythema.   Eyes:      General:         Right eye: No discharge.         Left eye: No discharge.      Conjunctiva/sclera: Conjunctivae normal.   Neck:      Musculoskeletal: Normal range of motion.   Cardiovascular:      Rate and Rhythm: Normal rate and regular rhythm.      Heart sounds: Normal heart sounds. No murmur.   Pulmonary:      Effort: Pulmonary effort is normal. No respiratory distress.      Breath sounds: Normal breath sounds and air entry. No stridor, decreased air movement or transmitted upper airway sounds. No decreased breath sounds, wheezing, rhonchi or rales.   Musculoskeletal: Normal range of motion.   Lymphadenopathy:      Cervical: No cervical adenopathy.   Skin:     General: Skin is warm.      Capillary Refill: Capillary refill takes less than 2 seconds.   Neurological:      Mental Status: He is alert and oriented to person, place, and time.   Psychiatric:         Mood and Affect: Mood normal.         Behavior: Behavior normal.               WORKUP:   SPIROMETRY       FVC 4.42L (86% of predicted).     FEV1 3.61L (85% of predicted).     FEV1/FVC 82%     FEF 25%-75%  3.58L/s " (82% of predicted).    My interpretation: The office spirometry performed today doesn't suggest an obstruction.    Asthma Control Test (ACT) total score: 25     ASSESSMENT/PLAN:    1. Mild intermittent asthma without complication  (primary encounter diagnosis)    Currently well controlled with albuterol inhaler on as-needed basis.  -Continue as is.        Spirometry, Breathing Capacity, albuterol         (PROAIR HFA/PROVENTIL HFA/VENTOLIN HFA) 108 (90        Base) MCG/ACT inhaler            2. Seasonal allergic rhinitis due to pollen 3. Non-seasonal allergic rhinitis due to animal hair and dander 4. Chronic allergic conjunctivitis  5. Other allergy, other than to medicinal agents    Comparing his symptoms before allergen immunotherapy, he has improved by 60%, which is stable. He reported the same thing last year. In the past, I offered to increase the maintenance dose. The patient states that he is not interested in that. He is satisfied with allergen immunotherapy results and would like to continue further.  I suggest treating the residual symptoms with azelastine 2 sprays in each nostril twice daily as needed.  If symptoms become persistent, start intranasal fluticasone 2 sprays in each nostril once daily, and then he can stop when he is back to his baseline.    fluticasone (FLONASE) 50 MCG/ACT nasal spray  azelastine (ASTELIN) 0.1 % nasal spray              Return in about 1 year (around 1/14/2021), or if symptoms worsen or fail to improve.    Thank you for allowing us to participate in the care of this patient. Please feel free to contact us if there are any questions or concerns about the patient.    Disclaimer: This note consists of symbols derived from keyboarding, dictation and/or voice recognition software. As a result, there may be errors in the script that have gone undetected. Please consider this when interpreting information found in this chart.    Carlo Alegre MD, FAAAAI, FACAAI  Allergy, Asthma and  Immunology  [facility], [place] and [place]      Again, thank you for allowing me to participate in the care of your patient.        Sincerely,        Carlo Alegre MD

## 2020-01-14 NOTE — PATIENT INSTRUCTIONS
Continue with allergen immunotherapy as is. The goal is to attempt stopping it in September 2022.    -Use azelastine 2 sprays in each nostril twice a day when necessary.    If nasal symptoms become worse and persistent, use Flonase 2 sprays/each nostril once a day, until you get better.

## 2020-01-15 ASSESSMENT — ASTHMA QUESTIONNAIRES: ACT_TOTALSCORE: 25

## 2020-01-24 DIAGNOSIS — J30.1 CHRONIC SEASONAL ALLERGIC RHINITIS DUE TO POLLEN: ICD-10-CM

## 2020-01-24 DIAGNOSIS — J30.81 NON-SEASONAL ALLERGIC RHINITIS DUE TO ANIMAL HAIR AND DANDER: Primary | ICD-10-CM

## 2020-01-24 PROCEDURE — 95165 ANTIGEN THERAPY SERVICES: CPT | Performed by: ALLERGY & IMMUNOLOGY

## 2020-01-24 NOTE — PROGRESS NOTES
Allergy serums billed at [place].     Vials billed below:    Vial Color                               Content                      Vial Size         Expiration Date  Red 1:1                                   Cat, Dog                      5mL                 01/22/21  Red 1:1                                   Trees                           5mL                 01/22/21    Original Refill encounter date: 01/14/20      Signature  [place] LPN

## 2020-01-24 NOTE — TELEPHONE ENCOUNTER
Allergy serums received at [place].     Vials received below:    Vial Color Content                      Vial Size Expiration Date  Red 1:1 Cat, Dog 5mL  01/22/21  Red 1:1 Trees 5mL  01/22/21            Signature  [place] Rosa LPN

## 2020-02-16 NOTE — PROGRESS NOTES
Patient presented after waiting 30 minutes with no reaction to  injections. Discharged from clinic.    Kriss Hamilton RN     Negative

## 2020-02-21 ENCOUNTER — ALLIED HEALTH/NURSE VISIT (OUTPATIENT)
Dept: ALLERGY | Facility: CLINIC | Age: 30
End: 2020-02-21
Payer: COMMERCIAL

## 2020-02-21 DIAGNOSIS — Z51.6 NEED FOR DESENSITIZATION TO ALLERGENS: Primary | ICD-10-CM

## 2020-02-21 DIAGNOSIS — J30.1 SEASONAL ALLERGIC RHINITIS DUE TO POLLEN: ICD-10-CM

## 2020-02-21 DIAGNOSIS — J30.81 NON-SEASONAL ALLERGIC RHINITIS DUE TO ANIMAL HAIR AND DANDER: ICD-10-CM

## 2020-02-21 PROCEDURE — 95117 IMMUNOTHERAPY INJECTIONS: CPT

## 2020-02-21 PROCEDURE — 99207 ZZC DROP WITH A PROCEDURE: CPT

## 2020-02-21 RX ORDER — EPINEPHRINE 0.3 MG/.3ML
0.3 INJECTION SUBCUTANEOUS PRN
Qty: 2 EACH | Refills: 1 | Status: SHIPPED | OUTPATIENT
Start: 2020-02-21

## 2020-05-07 ENCOUNTER — TELEPHONE (OUTPATIENT)
Dept: ALLERGY | Facility: CLINIC | Age: 30
End: 2020-05-07

## 2020-05-07 NOTE — TELEPHONE ENCOUNTER
Left message on answering machine for patient to call back.  Patient is able to schedule allergy injection    Mary Paul RN

## 2020-05-21 ENCOUNTER — ALLIED HEALTH/NURSE VISIT (OUTPATIENT)
Dept: ALLERGY | Facility: CLINIC | Age: 30
End: 2020-05-21
Payer: COMMERCIAL

## 2020-05-21 DIAGNOSIS — Z51.6 NEED FOR DESENSITIZATION TO ALLERGENS: Primary | ICD-10-CM

## 2020-05-21 PROCEDURE — 99207 ZZC DROP WITH A PROCEDURE: CPT

## 2020-05-21 PROCEDURE — 95117 IMMUNOTHERAPY INJECTIONS: CPT

## 2020-06-29 ENCOUNTER — ALLIED HEALTH/NURSE VISIT (OUTPATIENT)
Dept: ALLERGY | Facility: CLINIC | Age: 30
End: 2020-06-29
Payer: COMMERCIAL

## 2020-06-29 DIAGNOSIS — Z51.6 NEED FOR DESENSITIZATION TO ALLERGENS: Primary | ICD-10-CM

## 2020-06-29 PROCEDURE — 95117 IMMUNOTHERAPY INJECTIONS: CPT

## 2020-06-29 PROCEDURE — 99207 ZZC DROP WITH A PROCEDURE: CPT

## 2020-07-20 NOTE — MR AVS SNAPSHOT
"              After Visit Summary   6/30/2017    Dmitri Mcguire    MRN: [id number]           Patient Information     Date Of Birth          1990        Visit Information        Provider Department      6/30/2017 10:45 AM ALLERGY [facility]        Today's Diagnoses     Allergic rhinitis, unspecified    -  1       Follow-ups after your visit        Your next 10 appointments already scheduled     Jul 07, 2017 11:15 AM CDT   Nurse Only with ALLERGY [facility] ([facility])    [address] [address]   [phone]           Every allergy patient MUST wait 30 minutes after their allergy shot. No exceptions.  Xolair shots #1-3 should plan to wait 2 hours in clinic Xolair shots after #4 should plan 30 minute wait in clinic              Who to contact     If you have questions or need follow up information about today's clinic visit or your schedule please contact [facility] directly at [phone].  Normal or non-critical lab and imaging results will be communicated to you by [place], letter or phone within 4 business days after the clinic has received the results. If you do not hear from us within 7 days, please contact the clinic through Deedt or phone. If you have a critical or abnormal lab result, we will notify you by phone as soon as possible.  Submit refill requests through Geron or call your pharmacy and they will forward the refill request to us. Please allow 3 business days for your refill to be completed.          Additional Information About Your Visit        [place] [place] Information     Geron lets you send messages to your doctor, view your test results, renew your prescriptions, schedule appointments and more. To sign up, go to [email or web address] . Click on \"Log in\" on the left side of the screen, which will take you to the Welcome page. Then click on \"Sign up Now\" on the right side of the page. " Huong     You will be asked to enter the access code listed below, as well as some personal information. Please follow the directions to create your username and password.     Your access code is: [email or web address]  Expires: 2017  9:35 AM     Your access code will  in 90 days. If you need help or a new code, please call your [place] clinic or [phone].        Care EveryWhere ID     This is your Care EveryWhere ID. This could be used by other organizations to access your [place] medical records  [id number]         Blood Pressure from Last 3 Encounters:   17 124/78   17 131/84   17 124/79    Weight from Last 3 Encounters:   17 168 lb 14.4 oz (76.6 kg)   17 170 lb 10.2 oz (77.4 kg)   16 160 lb (72.6 kg)              We Performed the Following     Allergy Shot: One injection        Primary Care Provider Office Phone #    [facility] [phone]       No address on file        Equal Access to Services     MIL[facility]IA [facility] HospitalESTEFANIA : Hadii aad ku hadasho Sonikko, waaxda luqadaha, qaybta kaalmada adeegyazita, josiah rahman . So [facility] [phone].    ATENCIÓN: Si habla español, tiene a spencer disposición servicios gratuitos de asistencia lingüística. Llame al [phone].    We comply with applicable federal civil rights laws and [place] laws. We do not discriminate on the basis of race, color, national origin, age, disability sex, sexual orientation or gender identity.            Thank you!     Thank you for choosing [facility]  for your care. Our goal is always to provide you with excellent care. Hearing back from our patients is one way we can continue to improve our services. Please take a few minutes to complete the written survey that you may receive in the mail after your visit with us. Thank you!             Your Updated Medication List - Protect others around you: Learn how to safely use, store and throw away your medicines at  www.disposemymeds.org.          This list is accurate as of: 6/30/17 11:39 AM.  Always use your most recent med list.                   Brand Name Dispense Instructions for use Diagnosis    ADVIL 200 MG capsule   Generic drug:  ibuprofen      Take 200 mg by mouth every 4 hours as needed Reported on 4/28/2017        albuterol 108 (90 BASE) MCG/ACT Inhaler    PROAIR HFA/PROVENTIL HFA/VENTOLIN HFA    1 Inhaler    Inhale 2 puffs into the lungs every 4 hours as needed    Intermittent asthma, uncomplicated       * ALLERGEN IMMUNOTHERAPY PRESCRIPTION     5 mL    Name of Mix: Mix #1  Cat, Dog Cat Hair, Standardized 10,000 BAU/mL, ALK  2.0 ml Dog Hair Dander, A. P.  1:100 w/v, HS  1.0 ml  Diluent: HSA qs to 5ml    Seasonal allergic rhinitis due to pollen, Non-seasonal allergic rhinitis due to animal hair and dander, Chronic allergic conjunctivitis, Need for desensitization to allergens       * ALLERGEN IMMUNOTHERAPY PRESCRIPTION     5 mL    Name of Mix: Mix #2  Tree  Birch Mix GLY 1:20 w/v, HS  0.5 ml Boxelder-Maple  Mix BHR (Boxelder Hard Red) 1:20 w/v, HS  0.5 ml [place] Mix GLY 1:20 w/v, HS 0.5 ml Oak Mix RVW GLY 1:20 w/v, HS 0.5 ml [place] Tree, Black GLY 1:20 w/v, HS 0.5 ml [place], Black GLY 1:20 w/v, HS 0.5 ml Diluent: HSA qs to 5ml    Seasonal allergic rhinitis due to pollen, Non-seasonal allergic rhinitis due to animal hair and dander, Chronic allergic conjunctivitis, Need for desensitization to allergens       EPINEPHrine 0.3 MG/0.3ML injection    AUVI-Q    0.6 mL    Inject 0.3 mLs (0.3 mg) into the muscle as needed for anaphylaxis    Seasonal allergic rhinitis due to pollen, Non-seasonal allergic rhinitis due to animal hair and dander       fluticasone 50 MCG/ACT spray    FLONASE    1 Bottle    Spray 2 sprays into both nostrils daily    Atopic rhinitis       * Notice:  This list has 2 medication(s) that are the same as other medications prescribed for you. Read the directions carefully, and ask your doctor or other  care provider to review them with you.

## 2020-08-07 ENCOUNTER — ALLIED HEALTH/NURSE VISIT (OUTPATIENT)
Dept: ALLERGY | Facility: CLINIC | Age: 30
End: 2020-08-07
Payer: COMMERCIAL

## 2020-08-07 DIAGNOSIS — Z51.6 NEED FOR DESENSITIZATION TO ALLERGENS: Primary | ICD-10-CM

## 2020-08-07 PROCEDURE — 95117 IMMUNOTHERAPY INJECTIONS: CPT

## 2020-08-07 PROCEDURE — 99207 ZZC DROP WITH A PROCEDURE: CPT

## 2020-08-07 NOTE — PROGRESS NOTES
Patient presented after waiting 30 minutes with no reaction to  injections. Discharged from clinic.    Vianney TOMLINSON   Allergy EULOGIO

## 2020-09-08 ENCOUNTER — ALLIED HEALTH/NURSE VISIT (OUTPATIENT)
Dept: ALLERGY | Facility: CLINIC | Age: 30
End: 2020-09-08
Payer: COMMERCIAL

## 2020-09-08 DIAGNOSIS — Z51.6 NEED FOR DESENSITIZATION TO ALLERGENS: Primary | ICD-10-CM

## 2020-09-08 DIAGNOSIS — J30.9 ALLERGIC RHINITIS: ICD-10-CM

## 2020-09-08 PROCEDURE — 99207 ZZC DROP WITH A PROCEDURE: CPT

## 2020-09-08 PROCEDURE — 95117 IMMUNOTHERAPY INJECTIONS: CPT

## 2020-10-19 ENCOUNTER — ALLIED HEALTH/NURSE VISIT (OUTPATIENT)
Dept: ALLERGY | Facility: CLINIC | Age: 30
End: 2020-10-19
Payer: COMMERCIAL

## 2020-10-19 DIAGNOSIS — Z51.6 NEED FOR DESENSITIZATION TO ALLERGENS: Primary | ICD-10-CM

## 2020-10-19 PROCEDURE — 99207 PR DROP WITH A PROCEDURE: CPT

## 2020-10-19 PROCEDURE — 95117 IMMUNOTHERAPY INJECTIONS: CPT

## 2020-11-03 ENCOUNTER — ALLIED HEALTH/NURSE VISIT (OUTPATIENT)
Dept: ALLERGY | Facility: CLINIC | Age: 30
End: 2020-11-03
Payer: COMMERCIAL

## 2020-11-03 DIAGNOSIS — Z51.6 NEED FOR DESENSITIZATION TO ALLERGENS: Primary | ICD-10-CM

## 2020-11-03 PROCEDURE — 99207 PR DROP WITH A PROCEDURE: CPT

## 2020-11-03 PROCEDURE — 95117 IMMUNOTHERAPY INJECTIONS: CPT

## 2020-11-03 NOTE — PROGRESS NOTES
Patient presented after waiting 30 minutes with no reaction to  injections. Discharged from clinic.    Vianney TOMLINSON RN  Specialty/Allergy Clinics

## 2020-11-16 ENCOUNTER — HEALTH MAINTENANCE LETTER (OUTPATIENT)
Age: 30
End: 2020-11-16

## 2020-11-16 ENCOUNTER — ALLIED HEALTH/NURSE VISIT (OUTPATIENT)
Dept: ALLERGY | Facility: CLINIC | Age: 30
End: 2020-11-16
Payer: COMMERCIAL

## 2020-11-16 DIAGNOSIS — Z51.6 NEED FOR DESENSITIZATION TO ALLERGENS: Primary | ICD-10-CM

## 2020-11-16 PROCEDURE — 95117 IMMUNOTHERAPY INJECTIONS: CPT

## 2020-11-16 PROCEDURE — 99207 PR DROP WITH A PROCEDURE: CPT

## 2020-12-14 ENCOUNTER — ALLIED HEALTH/NURSE VISIT (OUTPATIENT)
Dept: ALLERGY | Facility: CLINIC | Age: 30
End: 2020-12-14
Payer: COMMERCIAL

## 2020-12-14 DIAGNOSIS — Z51.6 NEED FOR DESENSITIZATION TO ALLERGENS: Primary | ICD-10-CM

## 2020-12-14 PROCEDURE — 99207 PR DROP WITH A PROCEDURE: CPT

## 2020-12-14 PROCEDURE — 95117 IMMUNOTHERAPY INJECTIONS: CPT

## 2021-01-12 ENCOUNTER — ALLIED HEALTH/NURSE VISIT (OUTPATIENT)
Dept: ALLERGY | Facility: CLINIC | Age: 31
End: 2021-01-12
Payer: COMMERCIAL

## 2021-01-12 DIAGNOSIS — J30.1 CHRONIC SEASONAL ALLERGIC RHINITIS DUE TO POLLEN: ICD-10-CM

## 2021-01-12 DIAGNOSIS — Z51.6 NEED FOR DESENSITIZATION TO ALLERGENS: ICD-10-CM

## 2021-01-12 DIAGNOSIS — H10.45 CHRONIC ALLERGIC CONJUNCTIVITIS: ICD-10-CM

## 2021-01-12 DIAGNOSIS — Z51.6 NEED FOR DESENSITIZATION TO ALLERGENS: Primary | ICD-10-CM

## 2021-01-12 DIAGNOSIS — J30.81 NON-SEASONAL ALLERGIC RHINITIS DUE TO ANIMAL HAIR AND DANDER: ICD-10-CM

## 2021-01-12 PROCEDURE — 95117 IMMUNOTHERAPY INJECTIONS: CPT

## 2021-01-12 PROCEDURE — 99207 PR DROP WITH A PROCEDURE: CPT

## 2021-01-12 NOTE — TELEPHONE ENCOUNTER
ALLERGY SOLUTION RE-ORDER REQUEST    Dmitri Mcguire 1990 MRN: [id number]    DATE NEEDED:  01/26/21  Vial Color Content    Vial Size  Red 1:1 Cat, Dog    5mL  Red 1:1 Trees   5mL                  Serum reorder consent signed and patient/parent was advised that new serums would be ordered through the pharmacy and billed to their insurance company when they arrive in clinic. Yes    Shot Clinic Location:  [place]  Ship to Location: [place]  Serum billed to:  [place]    Special Instructions:          Requester Signature  [place] Rosa LPN

## 2021-01-21 DIAGNOSIS — J30.1 CHRONIC SEASONAL ALLERGIC RHINITIS DUE TO POLLEN: Primary | ICD-10-CM

## 2021-01-21 DIAGNOSIS — J30.81 NON-SEASONAL ALLERGIC RHINITIS DUE TO ANIMAL HAIR AND DANDER: ICD-10-CM

## 2021-01-21 PROCEDURE — 95165 ANTIGEN THERAPY SERVICES: CPT | Performed by: ALLERGY & IMMUNOLOGY

## 2021-01-21 NOTE — TELEPHONE ENCOUNTER
Allergy serums received at [place].     Vials received below:    Vial Color Content                      Vial Size Expiration Date  Red 1:1 Trees 5mL  01/18/2022  Red 1:1 Cat, Dog 5mL  01/18/2022      Signature  [place] Rosa LPN

## 2021-01-21 NOTE — PROGRESS NOTES
Allergy serums billed at [place].     Vials billed below:    Vial Color Content                      Vial Size Expiration Date  Red 1:1 Trees 5mL  01/18/2022  Red 1:1 Cat, Dog 5mL  01/18/2022    Original Refill encounter date: 01/12/2021      Signature  [place] Rosa LPN

## 2021-02-09 ENCOUNTER — OFFICE VISIT (OUTPATIENT)
Dept: ALLERGY | Facility: CLINIC | Age: 31
End: 2021-02-09
Payer: COMMERCIAL

## 2021-02-09 ENCOUNTER — ALLIED HEALTH/NURSE VISIT (OUTPATIENT)
Dept: ALLERGY | Facility: CLINIC | Age: 31
End: 2021-02-09
Payer: COMMERCIAL

## 2021-02-09 VITALS
DIASTOLIC BLOOD PRESSURE: 90 MMHG | SYSTOLIC BLOOD PRESSURE: 131 MMHG | WEIGHT: 171.08 LBS | OXYGEN SATURATION: 97 % | TEMPERATURE: 98.7 F | BODY MASS INDEX: 26.23 KG/M2 | HEART RATE: 80 BPM

## 2021-02-09 DIAGNOSIS — Z51.6 NEED FOR DESENSITIZATION TO ALLERGENS: Primary | ICD-10-CM

## 2021-02-09 DIAGNOSIS — Z91.09 OTHER ALLERGY, OTHER THAN TO MEDICINAL AGENTS: ICD-10-CM

## 2021-02-09 DIAGNOSIS — J30.1 CHRONIC SEASONAL ALLERGIC RHINITIS DUE TO POLLEN: ICD-10-CM

## 2021-02-09 DIAGNOSIS — R09.81 NASAL CONGESTION: ICD-10-CM

## 2021-02-09 DIAGNOSIS — J30.9 ALLERGIC RHINITIS: ICD-10-CM

## 2021-02-09 DIAGNOSIS — J45.20 MILD INTERMITTENT ASTHMA WITHOUT COMPLICATION: ICD-10-CM

## 2021-02-09 DIAGNOSIS — H10.45 CHRONIC ALLERGIC CONJUNCTIVITIS: ICD-10-CM

## 2021-02-09 DIAGNOSIS — J30.81 NON-SEASONAL ALLERGIC RHINITIS DUE TO ANIMAL HAIR AND DANDER: Primary | ICD-10-CM

## 2021-02-09 PROCEDURE — 95117 IMMUNOTHERAPY INJECTIONS: CPT

## 2021-02-09 PROCEDURE — 99207 PR DROP WITH A PROCEDURE: CPT

## 2021-02-09 PROCEDURE — 99214 OFFICE O/P EST MOD 30 MIN: CPT | Performed by: ALLERGY & IMMUNOLOGY

## 2021-02-09 RX ORDER — ALBUTEROL SULFATE 90 UG/1
2 AEROSOL, METERED RESPIRATORY (INHALATION) EVERY 4 HOURS PRN
Qty: 18 G | Refills: 3 | Status: SHIPPED | OUTPATIENT
Start: 2021-02-09

## 2021-02-09 RX ORDER — AZELASTINE 1 MG/ML
2 SPRAY, METERED NASAL 2 TIMES DAILY PRN
Qty: 30 ML | Refills: 3 | Status: SHIPPED | OUTPATIENT
Start: 2021-02-09

## 2021-02-09 RX ORDER — AZELASTINE 1 MG/ML
2 SPRAY, METERED NASAL 2 TIMES DAILY PRN
Qty: 30 ML | Refills: 3 | Status: SHIPPED | OUTPATIENT
Start: 2021-02-09 | End: 2021-02-09

## 2021-02-09 SDOH — ECONOMIC STABILITY: TRANSPORTATION INSECURITY
IN THE PAST 12 MONTHS, HAS THE LACK OF TRANSPORTATION KEPT YOU FROM MEDICAL APPOINTMENTS OR FROM GETTING MEDICATIONS?: NOT ASKED

## 2021-02-09 SDOH — ECONOMIC STABILITY: INCOME INSECURITY: HOW HARD IS IT FOR YOU TO PAY FOR THE VERY BASICS LIKE FOOD, HOUSING, MEDICAL CARE, AND HEATING?: NOT ASKED

## 2021-02-09 SDOH — ECONOMIC STABILITY: FOOD INSECURITY: WITHIN THE PAST 12 MONTHS, THE FOOD YOU BOUGHT JUST DIDN'T LAST AND YOU DIDN'T HAVE MONEY TO GET MORE.: NOT ASKED

## 2021-02-09 SDOH — ECONOMIC STABILITY: TRANSPORTATION INSECURITY
IN THE PAST 12 MONTHS, HAS LACK OF TRANSPORTATION KEPT YOU FROM MEETINGS, WORK, OR FROM GETTING THINGS NEEDED FOR DAILY LIVING?: NOT ASKED

## 2021-02-09 SDOH — ECONOMIC STABILITY: FOOD INSECURITY: WITHIN THE PAST 12 MONTHS, YOU WORRIED THAT YOUR FOOD WOULD RUN OUT BEFORE YOU GOT MONEY TO BUY MORE.: NOT ASKED

## 2021-02-09 ASSESSMENT — ENCOUNTER SYMPTOMS
WHEEZING: 0
COUGH: 0
SHORTNESS OF BREATH: 0
RHINORRHEA: 1
CHEST TIGHTNESS: 0

## 2021-02-09 NOTE — LETTER
2/9/2021         RE: Dmitri Mcguire  [address] [facility] [id number]        Dear Colleague,    Thank you for referring your patient, Dmitri Mcguire, to the [facility]. Please see a copy of my visit note below.    SUBJECTIVE:                                                                   Dmitri Mcguire presents today to our Allergy Clinic at [facility] for a follow up visit.  He is a 31 year old male with allergic rhinoconjunctivitis and asthma.   Percutaneous skin puncture testing for aeroallergens performed on April 28, 2017 showed sensitivity for dog, cat, rabbit and tree pollen (Birch mix, [place], Oak, [place], and Black [place]). Borderline sensitivity for Selena/[place] noted.   On allergen immunotherapy. Cat/dog IT since 6/30/2017. Trees IT since 9/22/17.   Allergy Immunotherapy  Date/time of injection(s): February 9, 2021     Vial Color Content  Dose   Red 1:1 Cat, Dog  0.30 mL   Red 1:1 Trees  0.30 mL     The current doses decreased due to switching to the new vials.  She tolerates injections well without persistent large local reactions or systemic reactions.  His rhinoconjunctivitis symptoms are stable.  He reports at least 60% improvement in symptoms with allergen immunotherapy.  While he is significantly better around the dogs, he is not entirely asymptomatic.  He still develops nasal congestion, postnasal drip, rhinorrhea, sneezing, and itchy eyes, but not like before.  In the past, symptoms were significant, and he even had some chest symptoms.  He feels that the left side of his nose is more congested than the right.  He does not use any nasal sprays.  He does not like to take the medicines consistently.  In the past, I prescribed azelastine on as-needed basis, but he has not tried it.  He may take fexofenadine here and there and on the days of allergen immunotherapy injections.  Regarding his asthma, he states that it is well controlled.  Dmitri is using albuterol HFA less than twice per week for rescue from chest symptoms. He is waking up less than twice per month due to chest symptoms. There has been no use of oral steroids since the last visit. The patient denies current chest tightness, cough, wheeze, or dyspnea.   He denies interval ED/urgent care/PCP/other specialist visits for asthma flare.  Patient Active Problem List   Diagnosis     Intermittent asthma, uncomplicated     Seasonal allergic rhinitis due to pollen     Non-seasonal allergic rhinitis due to animal hair and dander     Chronic allergic conjunctivitis       Past Medical History:   Diagnosis Date     Allergic rhinitis due to other allergen      Mild intermittent asthma       Problem (# of Occurrences) Relation (Name,Age of Onset)    Cardiovascular (1) Maternal Grandfather: stents    Heart Disease (1) Maternal Grandfather        History reviewed. No pertinent surgical history.  Social History     Socioeconomic History     Marital status:      Spouse name: None     Number of children: None     Years of education: None     Highest education level: None   Occupational History     Employer: [employer]   Social Needs     Financial resource strain: None     Food insecurity     Worry: None     Inability: None     Transportation needs     Medical: None     Non-medical: None   Tobacco Use     Smoking status: Former Smoker     Years: 3.00     Types: Cigarettes, Dip, chew, snus or snuff     Smokeless tobacco: Former User     Types: Chew   Substance and Sexual Activity     Alcohol use: Yes     Drug use: No     Sexual activity: Yes     Partners: Female     Birth control/protection: Condom   Lifestyle     Physical activity     Days per week: None     Minutes per session: None     Stress: None   Relationships     Social connections     Talks on phone: None     Gets together: None     Attends Zoroastrian service: None     Active member of club or organization: None     Attends meetings of clubs  or organizations: None     Relationship status: None     Intimate partner violence     Fear of current or ex partner: None     Emotionally abused: None     Physically abused: None     Forced sexual activity: None   Other Topics Concern     Parent/sibling w/ CABG, MI or angioplasty before 65F 55M? No   Social History Narrative    January 14, 2020    ENVIRONMENTAL HISTORY: [place] home is heated with a electric furnace and in floor heat. They does have central air conditioning. The patient's bedroom is furnished with carpeting in bedroom and fabric window coverings.  Pets inside the house include 1 dog. There is not history of cockroach or mice infestation. There are no smokers in the house.  The house does not have a damp basement.           Review of Systems   HENT: Positive for congestion, rhinorrhea and sneezing.    Respiratory: Negative for cough, chest tightness, shortness of breath and wheezing.    Allergic/Immunologic: Positive for environmental allergies.           Current Outpatient Medications:      albuterol (PROAIR HFA/PROVENTIL HFA/VENTOLIN HFA) 108 (90 Base) MCG/ACT inhaler, Inhale 2 puffs into the lungs every 4 hours as needed for shortness of breath / dyspnea or wheezing, Disp: 18 g, Rfl: 3     azelastine (ASTELIN) 0.1 % nasal spray, Spray 2 sprays into both nostrils 2 times daily as needed for rhinitis, Disp: 30 mL, Rfl: 3     ibuprofen (ADVIL) 200 MG capsule, Take 200 mg by mouth every 4 hours as needed Reported on 4/28/2017, Disp: , Rfl:      ORDER FOR ALLERGEN IMMUNOTHERAPY, Name of Mix: Mix #2  Tree  Birch Mix PRW 1:20 w/v, HS  0.5 ml Boxelder-Maple Mix BHR (Boxelder Hard Red) 1:20 w/v, HS  0.5 ml [place] Mix RW 1:20 w/v, HS 0.5 ml Oak Mix RVW 1:20 w/v, HS 0.5 ml [place] Tree, Black 1:20 w/v, HS 0.5 ml [place], Black 1:20 w/v, HS 0.5 ml Diluent: HSA qs to 5ml, Disp: 5 mL, Rfl: PRN     ORDER FOR ALLERGEN IMMUNOTHERAPY, Name of Mix: Mix #1  Cat, Dog  Cat Hair, Standardized 10,000 BAU/mL, ALK  2.0 ml Dog Hair Dander, A. P.  1:100 w/v, HS  1.0 ml  Diluent: HSA qs to 5ml, Disp: 5 mL, Rfl: PRN     EPINEPHrine (AUVI-Q) 0.3 MG/0.3ML IJ injection 2-pack, Inject 0.3 mLs (0.3 mg) into the muscle as needed for anaphylaxis (Patient not taking: Reported on 2/9/2021), Disp: 2 each, Rfl: 1     fluticasone (FLONASE) 50 MCG/ACT nasal spray, Spray 2 sprays into both nostrils daily (Patient not taking: Reported on 2/9/2021), Disp: 16 g, Rfl: 3     fluticasone (FLONASE) 50 MCG/ACT spray, Spray 2 sprays into both nostrils daily (Patient not taking: Reported on 9/29/2017), Disp: 1 Bottle, Rfl: 11  Immunization History   Administered Date(s) Administered     HEPA 08/25/2010     HepB 08/18/2004, 09/24/2004, 08/03/2005     Hib (PRP-T) 1992, 1992, 1992, 05/04/1993     Historical DTP/aP 1992, 1992, 1992, 10/05/1993, 05/14/1997     Influenza (IIV3) PF 11/27/2007, 12/01/2008, 10/05/2009, 09/17/2010     MMR 05/04/1993, 08/18/2004     Meningococcal (Menactra ) 08/25/2010     OPV, trivalent, live 1992, 1992, 10/05/1993     Poliovirus, inactivated (IPV) 05/14/1997     TDAP Vaccine (Adacel) 06/01/2017     Tetanus 08/18/2004     Allergies   Allergen Reactions     Ceclor [Cefaclor] Unknown     Erythrocin [Erythromycin] Diarrhea     Penicillins Unknown     Amoxillicin     OBJECTIVE:                                                                 BP (!) 131/90 (BP Location: Left arm, Patient Position: Sitting, Cuff Size: Adult Regular)   Pulse 80   Temp 98.7  F (37.1  C) (Tympanic)   Wt 77.6 kg (171 lb 1.2 oz)   SpO2 97%   BMI 26.23 kg/m          Physical Exam  Vitals signs and nursing note reviewed.   Constitutional:       General: He is not in acute distress.     Appearance: He is not diaphoretic.   HENT:      Head: Normocephalic and atraumatic.      Right Ear: Tympanic membrane, ear canal and external ear normal.      Left Ear: Tympanic  membrane, ear canal and external ear normal.      Nose: Mucosal edema present. No rhinorrhea.      Right Turbinates: Enlarged and swollen.      Left Turbinates: Enlarged and swollen.      Mouth/Throat:      Lips: Pink.      Mouth: Mucous membranes are moist.      Pharynx: Oropharynx is clear. No pharyngeal swelling, oropharyngeal exudate or posterior oropharyngeal erythema.   Eyes:      General:         Right eye: No discharge.         Left eye: No discharge.      Conjunctiva/sclera: Conjunctivae normal.   Neck:      Musculoskeletal: Normal range of motion.   Cardiovascular:      Rate and Rhythm: Normal rate and regular rhythm.      Heart sounds: Normal heart sounds. No murmur.   Pulmonary:      Effort: Pulmonary effort is normal. No respiratory distress.      Breath sounds: Normal breath sounds and air entry. No stridor, decreased air movement or transmitted upper airway sounds. No decreased breath sounds, wheezing, rhonchi or rales.   Musculoskeletal: Normal range of motion.   Lymphadenopathy:      Cervical: No cervical adenopathy.   Skin:     General: Skin is warm.      Capillary Refill: Capillary refill takes less than 2 seconds.   Neurological:      Mental Status: He is alert and oriented to person, place, and time.   Psychiatric:         Mood and Affect: Mood normal.         Behavior: Behavior normal.         WORKUP:   ACT Score:  25    ASSESSMENT/PLAN:    Non-seasonal allergic rhinitis due to animal hair and dander  Chronic seasonal allergic rhinitis due to pollen  Chronic allergic conjunctivitis  Nasal congestion  Allergic rhinoconjunctivitis symptoms improved by 60% with allergen immunotherapy.  The patient does not want to use intranasal fluticasone long-term.    I believe that with the current dose of allergen immunotherapy, he reached a plateau and wouldn't expect any changes in his symptoms without modifying anything.   We discussed that we could increase the dose of allergen immunotherapy; however, it  is risky, and he may develop anaphylaxis with higher doses.  The patient states that he is willing to take the risk.  -I recommend him to start using azelastine 2 sprays in each nostril twice daily as needed.  If nasal symptoms become persistent and bothersome, he can also use intranasal fluticasone at least for the short-term.  Since he does have predominant nasal congestion on the left side, I recommend evaluation by ENT, including rhinoscopy.  If he does have a deviated nasal septum as well, which I do not visualize on a plain nasal exam, increasing allergen immunotherapy doses may not help.  If no issues with the septum will increase the maintenance dose of allergen immunotherapy with cat and dog vial to 0.7 mL.     - azelastine (ASTELIN) 0.1 % nasal spray  Dispense: 30 mL; Refill: 3  - OTOLARYNGOLOGY REFERRAL    Mild intermittent asthma without complication  Currently well controlled with albuterol inhaler on as needed basis.  -Continue as is.      - albuterol (PROAIR HFA/PROVENTIL HFA/VENTOLIN HFA) 108 (90 Base) MCG/ACT inhaler  Dispense: 18 g; Refill: 3       39 minutes spent on the date of the encounter during chart review, history and exam, documentation, and further activities as noted above    Return in about 1 year (around 2/9/2022), or if symptoms worsen or fail to improve.    Thank you for allowing us to participate in the care of this patient. Please feel free to contact us if there are any questions or concerns about the patient.    Disclaimer: This note consists of symbols derived from keyboarding, dictation and/or voice recognition software. As a result, there may be errors in the script that have gone undetected. Please consider this when interpreting information found in this chart.    Carlo Alegre MD, FAAAAI, FACAAI  Allergy, Asthma and Immunology    [facility]         Again, thank you for allowing me to participate in the care of your patient.         Sincerely,        Carlo Alegre MD

## 2021-02-09 NOTE — PROGRESS NOTES
SUBJECTIVE:                                                                   Dmitri Mcguire presents today to our Allergy Clinic at [facility] for a follow up visit.  He is a 31 year old male with allergic rhinoconjunctivitis and asthma.   Percutaneous skin puncture testing for aeroallergens performed on April 28, 2017 showed sensitivity for dog, cat, rabbit and tree pollen (Birch mix, [place], Oak, [place], and Black [place]). Borderline sensitivity for Selena/[place] noted.   On allergen immunotherapy. Cat/dog IT since 6/30/2017. Trees IT since 9/22/17.   Allergy Immunotherapy  Date/time of injection(s): February 9, 2021     Vial Color Content  Dose   Red 1:1 Cat, Dog  0.30 mL   Red 1:1 Trees  0.30 mL     The current doses decreased due to switching to the new vials.  She tolerates injections well without persistent large local reactions or systemic reactions.  His rhinoconjunctivitis symptoms are stable.  He reports at least 60% improvement in symptoms with allergen immunotherapy.  While he is significantly better around the dogs, he is not entirely asymptomatic.  He still develops nasal congestion, postnasal drip, rhinorrhea, sneezing, and itchy eyes, but not like before.  In the past, symptoms were significant, and he even had some chest symptoms.  He feels that the left side of his nose is more congested than the right.  He does not use any nasal sprays.  He does not like to take the medicines consistently.  In the past, I prescribed azelastine on as-needed basis, but he has not tried it.  He may take fexofenadine here and there and on the days of allergen immunotherapy injections.  Regarding his asthma, he states that it is well controlled. Dmitri is using albuterol HFA less than twice per week for rescue from chest symptoms. He is waking up less than twice per month due to chest symptoms. There has been no use of oral steroids since the last visit. The patient denies current chest  tightness, cough, wheeze, or dyspnea.   He denies interval ED/urgent care/PCP/other specialist visits for asthma flare.  Patient Active Problem List   Diagnosis     Intermittent asthma, uncomplicated     Seasonal allergic rhinitis due to pollen     Non-seasonal allergic rhinitis due to animal hair and dander     Chronic allergic conjunctivitis       Past Medical History:   Diagnosis Date     Allergic rhinitis due to other allergen      Mild intermittent asthma       Problem (# of Occurrences) Relation (Name,Age of Onset)    Cardiovascular (1) Maternal Grandfather: stents    Heart Disease (1) Maternal Grandfather        History reviewed. No pertinent surgical history.  Social History     Socioeconomic History     Marital status:      Spouse name: None     Number of children: None     Years of education: None     Highest education level: None   Occupational History     Employer: [employer]   Social Needs     Financial resource strain: None     Food insecurity     Worry: None     Inability: None     Transportation needs     Medical: None     Non-medical: None   Tobacco Use     Smoking status: Former Smoker     Years: 3.00     Types: Cigarettes, Dip, chew, snus or snuff     Smokeless tobacco: Former User     Types: Chew   Substance and Sexual Activity     Alcohol use: Yes     Drug use: No     Sexual activity: Yes     Partners: Female     Birth control/protection: Condom   Lifestyle     Physical activity     Days per week: None     Minutes per session: None     Stress: None   Relationships     Social connections     Talks on phone: None     Gets together: None     Attends Yazidi service: None     Active member of club or organization: None     Attends meetings of clubs or organizations: None     Relationship status: None     Intimate partner violence     Fear of current or ex partner: None     Emotionally abused: None     Physically abused: None     Forced sexual activity: None   Other Topics Concern      Parent/sibling w/ CABG, MI or angioplasty before 65F 55M? No   Social History Narrative    January 14, 2020    ENVIRONMENTAL HISTORY: [place] The home is heated with a electric furnace and in floor heat. They does have central air conditioning. The patient's bedroom is furnished with carpeting in bedroom and fabric window coverings.  Pets inside the house include 1 dog. There is not history of cockroach or mice infestation. There are no smokers in the house.  The house does not have a damp basement.           Review of Systems   HENT: Positive for congestion, rhinorrhea and sneezing.    Respiratory: Negative for cough, chest tightness, shortness of breath and wheezing.    Allergic/Immunologic: Positive for environmental allergies.           Current Outpatient Medications:      albuterol (PROAIR HFA/PROVENTIL HFA/VENTOLIN HFA) 108 (90 Base) MCG/ACT inhaler, Inhale 2 puffs into the lungs every 4 hours as needed for shortness of breath / dyspnea or wheezing, Disp: 18 g, Rfl: 3     azelastine (ASTELIN) 0.1 % nasal spray, Spray 2 sprays into both nostrils 2 times daily as needed for rhinitis, Disp: 30 mL, Rfl: 3     ibuprofen (ADVIL) 200 MG capsule, Take 200 mg by mouth every 4 hours as needed Reported on 4/28/2017, Disp: , Rfl:      ORDER FOR ALLERGEN IMMUNOTHERAPY, Name of Mix: Mix #2  Tree  Birch Mix PRW 1:20 w/v, HS  0.5 ml Boxelder-Maple Mix BHR (Boxelder Hard Red) 1:20 w/v, HS  0.5 ml [place] Mix RW 1:20 w/v, HS 0.5 ml [place] Mix RVW 1:20 w/v, HS 0.5 ml [place] Tree, Black 1:20 w/v, HS 0.5 ml [place], Black 1:20 w/v, HS 0.5 ml Diluent: HSA qs to 5ml, Disp: 5 mL, Rfl: PRN     ORDER FOR ALLERGEN IMMUNOTHERAPY, Name of Mix: Mix #1  Cat, Dog Cat Hair, Standardized 10,000 BAU/mL, ALK  2.0 ml Dog Hair Dander, A. P.  1:100 w/v, HS  1.0 ml  Diluent: HSA qs to 5ml, Disp: 5 mL, Rfl: PRN     EPINEPHrine (AUVI-Q) 0.3 MG/0.3ML IJ injection 2-pack, Inject 0.3 mLs (0.3 mg) into the muscle  as needed for anaphylaxis (Patient not taking: Reported on 2/9/2021), Disp: 2 each, Rfl: 1     fluticasone (FLONASE) 50 MCG/ACT nasal spray, Spray 2 sprays into both nostrils daily (Patient not taking: Reported on 2/9/2021), Disp: 16 g, Rfl: 3     fluticasone (FLONASE) 50 MCG/ACT spray, Spray 2 sprays into both nostrils daily (Patient not taking: Reported on 9/29/2017), Disp: 1 Bottle, Rfl: 11  Immunization History   Administered Date(s) Administered     HEPA 08/25/2010     HepB 08/18/2004, 09/24/2004, 08/03/2005     Hib (PRP-T) 1992, 1992, 1992, 05/04/1993     Historical DTP/aP 1992, 1992, 1992, 10/05/1993, 05/14/1997     Influenza (IIV3) PF 11/27/2007, 12/01/2008, 10/05/2009, 09/17/2010     MMR 05/04/1993, 08/18/2004     Meningococcal (Menactra ) 08/25/2010     OPV, trivalent, live 1992, 1992, 10/05/1993     Poliovirus, inactivated (IPV) 05/14/1997     TDAP Vaccine (Adacel) 06/01/2017     Tetanus 08/18/2004     Allergies   Allergen Reactions     Ceclor [Cefaclor] Unknown     Erythrocin [Erythromycin] Diarrhea     Penicillins Unknown     Amoxillicin     OBJECTIVE:                                                                 BP (!) 131/90 (BP Location: Left arm, Patient Position: Sitting, Cuff Size: Adult Regular)   Pulse 80   Temp 98.7  F (37.1  C) (Tympanic)   Wt 77.6 kg (171 lb 1.2 oz)   SpO2 97%   BMI 26.23 kg/m          Physical Exam  Vitals signs and nursing note reviewed.   Constitutional:       General: He is not in acute distress.     Appearance: He is not diaphoretic.   HENT:      Head: Normocephalic and atraumatic.      Right Ear: Tympanic membrane, ear canal and external ear normal.      Left Ear: Tympanic membrane, ear canal and external ear normal.      Nose: Mucosal edema present. No rhinorrhea.      Right Turbinates: Enlarged and swollen.      Left Turbinates: Enlarged and swollen.      Mouth/Throat:      Lips: Pink.      Mouth: Mucous membranes  are moist.      Pharynx: Oropharynx is clear. No pharyngeal swelling, oropharyngeal exudate or posterior oropharyngeal erythema.   Eyes:      General:         Right eye: No discharge.         Left eye: No discharge.      Conjunctiva/sclera: Conjunctivae normal.   Neck:      Musculoskeletal: Normal range of motion.   Cardiovascular:      Rate and Rhythm: Normal rate and regular rhythm.      Heart sounds: Normal heart sounds. No murmur.   Pulmonary:      Effort: Pulmonary effort is normal. No respiratory distress.      Breath sounds: Normal breath sounds and air entry. No stridor, decreased air movement or transmitted upper airway sounds. No decreased breath sounds, wheezing, rhonchi or rales.   Musculoskeletal: Normal range of motion.   Lymphadenopathy:      Cervical: No cervical adenopathy.   Skin:     General: Skin is warm.      Capillary Refill: Capillary refill takes less than 2 seconds.   Neurological:      Mental Status: He is alert and oriented to person, place, and time.   Psychiatric:         Mood and Affect: Mood normal.         Behavior: Behavior normal.         WORKUP:   ACT Score:  25    ASSESSMENT/PLAN:    Non-seasonal allergic rhinitis due to animal hair and dander  Chronic seasonal allergic rhinitis due to pollen  Chronic allergic conjunctivitis  Nasal congestion  Allergic rhinoconjunctivitis symptoms improved by 60% with allergen immunotherapy.  The patient does not want to use intranasal fluticasone long-term.    I believe that with the current dose of allergen immunotherapy, he reached a plateau and wouldn't expect any changes in his symptoms without modifying anything.   We discussed that we could increase the dose of allergen immunotherapy; however, it is risky, and he may develop anaphylaxis with higher doses.  The patient states that he is willing to take the risk.  -I recommend him to start using azelastine 2 sprays in each nostril twice daily as needed.  If nasal symptoms become persistent  and bothersome, he can also use intranasal fluticasone at least for the short-term.  Since he does have predominant nasal congestion on the left side, I recommend evaluation by ENT, including rhinoscopy.  If he does have a deviated nasal septum as well, which I do not visualize on a plain nasal exam, increasing allergen immunotherapy doses may not help.  If no issues with the septum will increase the maintenance dose of allergen immunotherapy with cat and dog vial to 0.7 mL.     - azelastine (ASTELIN) 0.1 % nasal spray  Dispense: 30 mL; Refill: 3  - OTOLARYNGOLOGY REFERRAL    Mild intermittent asthma without complication  Currently well controlled with albuterol inhaler on as needed basis.  -Continue as is.      - albuterol (PROAIR HFA/PROVENTIL HFA/VENTOLIN HFA) 108 (90 Base) MCG/ACT inhaler  Dispense: 18 g; Refill: 3       39 minutes spent on the date of the encounter during chart review, history and exam, documentation, and further activities as noted above    Return in about 1 year (around 2/9/2022), or if symptoms worsen or fail to improve.    Thank you for allowing us to participate in the care of this patient. Please feel free to contact us if there are any questions or concerns about the patient.    Disclaimer: This note consists of symbols derived from keyboarding, dictation and/or voice recognition software. As a result, there may be errors in the script that have gone undetected. Please consider this when interpreting information found in this chart.    Carlo Alegre MD, FAAAAI, FACAAI  Allergy, Asthma and Immunology    [facility]

## 2021-02-09 NOTE — PATIENT INSTRUCTIONS
See ENT.  -Start azelastine 2 sprays in each nostril twice a day when necessary. Continue with fexofenadine as needed.       Depending on what ENT has to say, we'll increase the dose of the vial with cat and dog. Also, if you feel that nasal symptoms bother you more than you can resist, start Flonase.       -Continue using albuterol inhaler 2-4 puffs every 4 hours as needed for chest tightness/wheezing/shortness of breath/persistent cough.  -Use all MDI inhalers with spacer/chamber device.

## 2021-02-10 ASSESSMENT — ASTHMA QUESTIONNAIRES: ACT_TOTALSCORE: 25

## 2021-02-25 ENCOUNTER — TELEPHONE (OUTPATIENT)
Dept: ALLERGY | Facility: CLINIC | Age: 31
End: 2021-02-25

## 2021-02-25 NOTE — TELEPHONE ENCOUNTER
Reason for Call:  Other appointment    Detailed comments: Would like to schedule a allergy injection at [place]     Phone Number Patient can be reached at: Home number on file [phone] (home)    Best Time: any     Can we leave a detailed message on this number? YES    Call taken on 2/25/2021 at 2:39 PM by Gabrielle Rucker

## 2021-02-25 NOTE — TELEPHONE ENCOUNTER
Left message on answering machine for patient to call back.    Vianney TOMLINSON RN  Specialty/Allergy Clinics

## 2021-03-03 NOTE — TELEPHONE ENCOUNTER
No response from pt. MC message sent regarding scheduling an appointment.     Vianney TOMLINSON RN  Specialty/Allergy Clinics

## 2021-09-12 ENCOUNTER — HEALTH MAINTENANCE LETTER (OUTPATIENT)
Age: 31
End: 2021-09-12

## 2022-01-02 ENCOUNTER — HEALTH MAINTENANCE LETTER (OUTPATIENT)
Age: 32
End: 2022-01-02

## 2022-02-02 ENCOUNTER — TELEPHONE (OUTPATIENT)
Dept: ALLERGY | Facility: CLINIC | Age: 32
End: 2022-02-02
Payer: COMMERCIAL

## 2022-11-19 ENCOUNTER — HEALTH MAINTENANCE LETTER (OUTPATIENT)
Age: 32
End: 2022-11-19

## 2023-01-23 NOTE — PROGRESS NOTES
SUBJECTIVE:                                                               Dmitri Mcguire presents today to our Allergy Clinic at [facility] for a follow up visit.  As you know, he is a 28 year old male with allergic rhinoconjunctivitis and asthma.    Percutaneous skin puncture testing for aeroallergens performed on April 28, 2017 showed sensitivity for dog, cat, rabbit and tree pollen (Birch mix, [place], Oak, [place], and Black [place]). Borderline sensitivity for Selena/[place] noted.   On allergen immunotherapy. Cat/dog IT since 6/30/2017. Trees IT since 9/22/17.        Allergy Immunotherapy  Date/time of injection(s): 9/14/18    Vial Color Content  Dose  Red 1:1 Cat, Dog  0.30mL  Red 1:1 Trees  0.30mL                He was able to reach the maintenance, Red 1:1 0.5 mL. The current dose was adjusted because he started new vials.  He tolerates injections well without persistent large local reactions or history of systemic reactions.    The patient reports at least 60% improvement in rhinoconjunctivitis symptoms with allergen immunotherapy.  They recently got a dog.  He develops nasal congestion, postnasal drip, rhinorrhea, sneezing, and itchy eyes only when he plays with the dog for a very long time.  In the past, he would not be able to spend much time in the same room with the dogs, even without touching.  On the other hand, he admits that he is not entirely asymptomatic around the pets.  He does not use any nasal sprays.  Will take Allegra only on the days of allergen immunotherapy and on rare occasions as needed.       Regarding his asthma, he states that it is well controlled. Dmitri is using albuterol HFA less than twice per week for rescue from chest symptoms. He is waking up less than twice per month due to chest symptoms. There has been no use of oral steroids since the last visit. The patient denies current chest tightness, cough, wheeze or SOB.   He denies interval ED/urgent  care/PCP/other specialists visits for asthma flare.        Patient Active Problem List   Diagnosis     Intermittent asthma, uncomplicated     Seasonal allergic rhinitis due to pollen     Non-seasonal allergic rhinitis due to animal hair and dander     Chronic allergic conjunctivitis       Past Medical History:   Diagnosis Date     Allergic rhinitis due to other allergen      Mild intermittent asthma       Problem (# of Occurrences) Relation (Name,Age of Onset)    Cardiovascular (1) Maternal Grandfather: stents    HEART DISEASE (1) Maternal Grandfather        History reviewed. No pertinent surgical history.  Social History     Social History     Marital status: Single     Spouse name: N/A     Number of children: N/A     Years of education: N/A     Social History Main Topics     Smoking status: Former Smoker     Years: 3.00     Types: Cigarettes, Dip, chew, snus or snuff     Smokeless tobacco: Former User     Types: Chew     Alcohol use Yes     Drug use: No     Sexual activity: Yes     Partners: Female     Birth control/ protection: Condom     Other Topics Concern     Parent/Sibling W/ Cabg, Mi Or Angioplasty Before 65f 55m? No     Social History Narrative    April 21, 2017    ENVIRONMENTAL HISTORY: [place] The [place] is heated with a gas furnace. They does have central air conditioning. The patient's bedroom is furnished with carpeting in bedroom and fabric window coverings.  Pets inside the house include 1 rabbit. There is not history of cockroach or mice infestation. There are no smokers in the house.  The house does not have a damp basement.    September 14, 2018    Patient now has a dog.            Review of Systems   Constitutional: Negative for activity change, fatigue and fever.   HENT: Positive for congestion, postnasal drip, rhinorrhea and sneezing. Negative for dental problem, ear pain, facial swelling, nosebleeds and sinus pressure.    Eyes: Positive for itching.  Negative for discharge and redness.   Respiratory: Negative for cough, chest tightness, shortness of breath and wheezing.    Cardiovascular: Negative for chest pain.   Gastrointestinal: Negative for diarrhea, nausea and vomiting.   Musculoskeletal: Negative for arthralgias, joint swelling and myalgias.   Allergic/Immunologic: Positive for environmental allergies.   Neurological: Negative for headaches.   Hematological: Negative for adenopathy.   Psychiatric/Behavioral: Negative for behavioral problems and self-injury.           Current Outpatient Prescriptions:      ORDER FOR ALLERGEN IMMUNOTHERAPY, Name of Mix: Mix #1  Cat, Dog Cat Hair, Standardized 10,000 BAU/mL, ALK  2.0 ml Dog Hair Dander, A. P.  1:100 w/v, HS  1.0 ml  Diluent: HSA qs to 5ml, Disp: 5 mL, Rfl: PRN     ORDER FOR ALLERGEN IMMUNOTHERAPY, Name of Mix: Mix #2  Tree  Birch Mix PRW 1:20 w/v, HS  0.5 ml [place]le Mix BHR (Boxelder Hard Red) 1:20 w/v, HS  0.5 ml [place] Mix RW 1:20 w/v, HS 0.5 ml Oak Mix RVW 1:20 w/v, HS 0.5 ml Energy Tree, Black 1:20 w/v, HS 0.5 ml [place], Black 1:20 w/v, HS 0.5 ml Diluent: HSA qs to 5ml, Disp: 5 mL, Rfl: PRN     albuterol (PROAIR HFA/PROVENTIL HFA/VENTOLIN HFA) 108 (90 BASE) MCG/ACT Inhaler, Inhale 2 puffs into the lungs every 4 hours as needed for shortness of breath / dyspnea or wheezing, Disp: , Rfl:      EPINEPHrine (AUVI-Q) 0.3 MG/0.3ML injection 2-pack, Inject 0.3 mLs (0.3 mg) into the muscle as needed for anaphylaxis (Patient not taking: Reported on 9/14/2018), Disp: 2 mL, Rfl: 1     fluticasone (FLONASE) 50 MCG/ACT spray, Spray 2 sprays into both nostrils daily (Patient not taking: Reported on 9/29/2017), Disp: 1 Bottle, Rfl: 11     ibuprofen (ADVIL) 200 MG capsule, Take 200 mg by mouth every 4 hours as needed Reported on 4/28/2017, Disp: , Rfl:   Immunization History   Administered Date(s) Administered     HEPA 08/25/2010     HepB 08/18/2004, 09/24/2004, 08/03/2005     Hib (PRP-T) 1992, 1992,  Topical Clindamycin Counseling: Patient counseled that this medication may cause skin irritation or allergic reactions.  In the event of skin irritation, the patient was advised to reduce the amount of the drug applied or use it less frequently.   The patient verbalized understanding of the proper use and possible adverse effects of clindamycin.  All of the patient's questions and concerns were addressed. 1992, 05/04/1993     Historical DTP/aP 1992, 1992, 1992, 10/05/1993, 05/14/1997     Influenza (IIV3) PF 11/27/2007, 12/01/2008, 10/05/2009, 09/17/2010     MMR 05/04/1993, 08/18/2004     Meningococcal (Menactra ) 08/25/2010     OPV, trivalent, live 1992, 1992, 10/05/1993     Poliovirus, inactivated (IPV) 05/14/1997     TDAP Vaccine (Adacel) 06/01/2017     Tetanus 08/18/2004     Allergies   Allergen Reactions     Amoxicillin Unknown     Ceclor [Cefaclor] Unknown     Erythrocin [Erythromycin] Diarrhea     Penicillins Unknown     OBJECTIVE:                                                                 /86 (BP Location: Left arm, Patient Position: Sitting, Cuff Size: Adult Regular)  Pulse 66  Temp 97.6  F (36.4  C) (Oral)  Resp 16  Wt 80 kg (176 lb 5.9 oz)  SpO2 97%  BMI 27.36 kg/m2        Physical Exam   Constitutional: No distress.   HENT:   Head: Normocephalic and atraumatic.   Right Ear: Tympanic membrane, external ear and ear canal normal.   Left Ear: Tympanic membrane, external ear and ear canal normal.   Nose: No mucosal edema or rhinorrhea.   Mouth/Throat: Oropharynx is clear and moist and mucous membranes are normal. No oropharyngeal exudate, posterior oropharyngeal edema or posterior oropharyngeal erythema.   Eyes: Conjunctivae are normal. Right eye exhibits no discharge. Left eye exhibits no discharge.   Neck: Normal range of motion.   Cardiovascular: Normal rate, regular rhythm and normal heart sounds.    No murmur heard.  Pulmonary/Chest: Effort normal and breath sounds normal. No respiratory distress. He has no decreased breath sounds. He has no wheezes. He has no rhonchi. He has no rales.   Musculoskeletal: Normal range of motion.   Neurological: He is alert.   Skin: Skin is warm. He is not diaphoretic.   Psychiatric: Mood, affect and judgment normal.   Nursing note and vitals reviewed.            WORKUP:   SPIROMETRY       FVC 4.28L (85% of predicted).      FEV1 3.60L (85% of predicted).     FEV1/FVC 84%     FEF 25%-75%  3.76L/s (84% of predicted)    The office spirometry performed today doesn't suggest an obstruction.      Asthma Control Test (ACT) total score: 24       ASSESSMENT/PLAN:      Problem List Items Addressed This Visit        Respiratory    1. Intermittent asthma, uncomplicated  Currently well controlled with albuterol inhaler on as-needed basis.  -Continue as is.    Relevant Orders    Spirometry, Breathing Capacity (Completed)    2. Seasonal allergic rhinitis due to pollen - Primary  After 1 year of treatment, his symptoms improved by 60%.  The patient is satisfied with the results though not completely but wants to continue allergen immunotherapy for the next year.  I offered him to stay either on the same dose, or consider increasing the maintenance dose to 0.6 mL, or even 0.7 mL.  We discussed the higher risk of anaphylaxis with higher doses.  He will discuss that with his fiancee and get back to us with an answer.  -He may use intranasal fluticasone or oral antihistamines more frequently when he has symptoms.    3. Non-seasonal allergic rhinitis due to animal hair and dander       Infectious/Inflammatory    4. Chronic allergic conjunctivitis        I spent 25 minutes for this visit, with at least 15 minutes face-to-face counseling the patient about management of allergic rhinitis and asthma, and efficacy of allergen immunotherapy.    Return in about 1 year (around 9/14/2019), or if symptoms worsen or fail to improve.    Thank you for allowing us to participate in the care of this patient. Please feel free to contact us if there are any questions or concerns about the patient.    Disclaimer: This note consists of symbols derived from keyboarding, dictation and/or voice recognition software. As a result, there may be errors in the script that have gone undetected. Please consider this when interpreting information found in this chart.    Carlo Alegre,  MD, TENA  Allergy, Asthma and Immunology  [facility], [place] and [place]

## 2023-04-09 ENCOUNTER — HEALTH MAINTENANCE LETTER (OUTPATIENT)
Age: 33
End: 2023-04-09

## 2023-10-05 ENCOUNTER — HOSPITAL ENCOUNTER (EMERGENCY)
Facility: CLINIC | Age: 33
Discharge: HOME | End: 2023-10-05
Attending: PHYSICIAN ASSISTANT | Admitting: PHYSICIAN ASSISTANT
Payer: COMMERCIAL

## 2023-10-05 VITALS
DIASTOLIC BLOOD PRESSURE: 95 MMHG | SYSTOLIC BLOOD PRESSURE: 138 MMHG | RESPIRATION RATE: 16 BRPM | OXYGEN SATURATION: 96 % | HEART RATE: 84 BPM | TEMPERATURE: 97.3 F

## 2023-10-05 DIAGNOSIS — J02.9 PHARYNGITIS: ICD-10-CM

## 2023-10-05 LAB — GROUP A STREP BY PCR: NOT DETECTED

## 2023-10-05 PROCEDURE — G0463 HOSPITAL OUTPT CLINIC VISIT: HCPCS | Performed by: PHYSICIAN ASSISTANT

## 2023-10-05 PROCEDURE — 87651 STREP A DNA AMP PROBE: CPT | Performed by: PHYSICIAN ASSISTANT

## 2023-10-05 PROCEDURE — 99213 OFFICE O/P EST LOW 20 MIN: CPT | Performed by: PHYSICIAN ASSISTANT

## 2023-10-05 NOTE — ED PROVIDER NOTES
History     Chief Complaint   Patient presents with    Pharyngitis     HPI  Dmitri Mcguire is a 33 year old male who presents to urgent care with concern over sore throat which is present for the last 4 days.  He just complains of fatigue, headache, postnasal drainage, swollen glands.  He did have some chills, myalgias.  No objective fever.  No significant cough, dyspnea, wheezing, nausea, vomiting, diarrhea or abdominal pain.  She denies any known ill contacts.      Allergies:  Allergies   Allergen Reactions    Ceclor [Cefaclor] Unknown    Erythrocin [Erythromycin] Diarrhea    Penicillins Unknown     Amoxillicin     Problem List:    Patient Active Problem List    Diagnosis Date Noted    Seasonal allergic rhinitis due to pollen 04/28/2017     Priority: Medium     Percutaneous skin puncture testing for aeroallergens performed on April 28, 2017 showed sensitivity for dog, cat, rabbit and tree pollen (Birch mix, [place], [place], [place], and Black [place]). Borderline sensitivity for Selena/[place] noted.   On allergen immunotherapy. Cat/dog since 6/30/2017. Trees since 9/22/17.       Non-seasonal allergic rhinitis due to animal hair and dander 04/28/2017     Priority: Medium    Chronic allergic conjunctivitis 04/28/2017     Priority: Medium    Intermittent asthma, uncomplicated 04/21/2017     Priority: Medium        Past Medical History:    Past Medical History:   Diagnosis Date    Allergic rhinitis due to other allergen     Mild intermittent asthma        Past Surgical History:    No past surgical history on file.    Family History:    Family History   Problem Relation Age of Onset    Heart Disease Maternal Grandfather     Cardiovascular Maternal Grandfather         stents     Social History:  Marital Status:   [2]  Social History     Tobacco Use    Smoking status: Former     Years: 3.00     Types: Cigarettes, Dip, chew, snus or snuff    Smokeless tobacco: Former     Types: Chew   Substance Use Topics    Alcohol  use: Yes    Drug use: No        Medications:    albuterol (PROAIR HFA/PROVENTIL HFA/VENTOLIN HFA) 108 (90 Base) MCG/ACT inhaler  azelastine (ASTELIN) 0.1 % nasal spray  EPINEPHrine (AUVI-Q) 0.3 MG/0.3ML IJ injection 2-pack  fluticasone (FLONASE) 50 MCG/ACT nasal spray  fluticasone (FLONASE) 50 MCG/ACT spray  ibuprofen (ADVIL) 200 MG capsule  ORDER FOR ALLERGEN IMMUNOTHERAPY  ORDER FOR ALLERGEN IMMUNOTHERAPY      Review of Systems  CONSTITUTIONAL:POSITIVE  for fatigue and myalgias NEGATIVE  for fever  INTEGUMENTARY/SKIN: NEGATIVE for worrisome rashes, moles or lesions  EYES: NEGATIVE for vision changes or irritation  ENT/MOUTH: POSITIVE for sore throat, postnasal drainage and NEGATIVE for ear pain   RESP:NEGATIVE for significant cough or SOB  GI: NEGATIVE for abdominal pain, diarrhea, nausea, and vomiting  Physical Exam   BP: (!) 138/95  Pulse: 84  Temp: 97.3  F (36.3  C)  Resp: 16  SpO2: 96 %    Physical Exam  GENERAL APPEARANCE: healthy, alert and no distress  EYES: EOMI,  PERRL, conjunctiva clear  HENT: ear canals and TM's normal.  Pharyngeal erythema   NECK: supple, nontender, bilateral posterior cervical lymphadenopathy   RESP: lungs clear to auscultation - no rales, rhonchi or wheezes  CV: regular rates and rhythm, normal S1 S2, no murmur noted  SKIN: no suspicious lesions or rashes  ED Course             Procedures       Critical Care time:  none        Results for orders placed or performed during the hospital encounter of 10/05/23 (from the past 24 hour(s))   Group A Streptococcus PCR Throat Swab    Specimen: Throat; Swab   Result Value Ref Range    Group A strep by PCR Not Detected Not Detected    Narrative    The Xpert Xpress Strep A test, performed on the [employer], is a rapid, qualitative in vitro diagnostic test for the detection of Streptococcus pyogenes (Group A ß-hemolytic Streptococcus, Strep A) in throat swab specimens from patients with signs and symptoms of pharyngitis. The  Xpert Xpress Strep A test can be used as an aid in the diagnosis of Group A Streptococcal pharyngitis. The assay is not intended to monitor treatment for Group A Streptococcus infections. The Xpert Xpress Strep A test utilizes an automated real-time polymerase chain reaction (PCR) to detect Streptococcus pyogenes DNA.     Medications - No data to display    Assessments & Plan (with Medical Decision Making)     I have reviewed the nursing notes.  I have reviewed the findings, diagnosis, plan and need for follow up with the patient.       Discharge Medication List as of 10/5/2023 12:49 PM        Final diagnoses:   Pharyngitis     33-year-old male presents to urgent care with concern over 4-day history of sore throat, fatigue, postnasal drainage and headache.  He had elevated blood pressure upon arrival, remainder of vital signs stable.  Physical exam findings significant for pharyngeal erythema, bilateral posterior cervical of adenopathy.  He had negative strep testing.  I discussed with/benefits of COVID-19 testing and patient declined at this time.  We also discussed potential for mono and patient again elected to defer testing due to limited sensitivity earlier in the course of illness.  We also discussed risk/benefits of Decadron for symptomatic relief and patient again declined.  He was discharged home stable with instructions for close follow-up if no improvement within the next 3 to 5 days.  Worrisome reasons to return to ER/UC sooner discussed.     Disclaimer: This note consists of symbols derived from keyboarding, dictation, and/or voice recognition software. As a result, there may be errors in the script that have gone undetected.  Please consider this when interpreting information found in the chart.    10/5/2023   [facility] EMERGENCY DEPT       Kathrin Baeza PA-C  10/05/23 [id number]

## 2023-10-05 NOTE — Clinical Note
Dmitri Mcguire was seen and treated in our emergency department on 10/5/2023.         Sincerely,     [facility] Emergency Dept

## 2024-06-16 ENCOUNTER — HEALTH MAINTENANCE LETTER (OUTPATIENT)
Age: 34
End: 2024-06-16

## 2025-06-21 ENCOUNTER — HEALTH MAINTENANCE LETTER (OUTPATIENT)
Age: 35
End: 2025-06-21